# Patient Record
Sex: MALE | Race: WHITE | NOT HISPANIC OR LATINO | Employment: OTHER | URBAN - NONMETROPOLITAN AREA
[De-identification: names, ages, dates, MRNs, and addresses within clinical notes are randomized per-mention and may not be internally consistent; named-entity substitution may affect disease eponyms.]

---

## 2023-01-01 ENCOUNTER — DOCUMENTATION (OUTPATIENT)
Dept: PRIMARY CARE | Facility: CLINIC | Age: 33
End: 2023-01-01
Payer: COMMERCIAL

## 2023-01-01 ENCOUNTER — PATIENT OUTREACH (OUTPATIENT)
Dept: CARE COORDINATION | Facility: CLINIC | Age: 33
End: 2023-01-01
Payer: COMMERCIAL

## 2023-01-01 ENCOUNTER — APPOINTMENT (OUTPATIENT)
Dept: CARDIOLOGY | Facility: HOSPITAL | Age: 33
End: 2023-01-01
Payer: COMMERCIAL

## 2023-01-01 ENCOUNTER — HOME INFUSION (OUTPATIENT)
Dept: INFUSION THERAPY | Age: 33
End: 2023-01-01
Payer: COMMERCIAL

## 2023-01-01 ENCOUNTER — HOSPITAL ENCOUNTER (OUTPATIENT)
Dept: CARDIOLOGY | Facility: HOSPITAL | Age: 33
Discharge: HOME | End: 2023-10-10
Payer: COMMERCIAL

## 2023-01-01 ENCOUNTER — APPOINTMENT (OUTPATIENT)
Dept: RADIOLOGY | Facility: HOSPITAL | Age: 33
End: 2023-01-01
Payer: COMMERCIAL

## 2023-01-01 ENCOUNTER — PATIENT OUTREACH (OUTPATIENT)
Dept: PRIMARY CARE | Facility: CLINIC | Age: 33
End: 2023-01-01
Payer: COMMERCIAL

## 2023-01-01 ENCOUNTER — HOSPITAL ENCOUNTER (OUTPATIENT)
Dept: DATA CONVERSION | Facility: HOSPITAL | Age: 33
Discharge: LEFT BEFORE DISCHARGE | End: 2023-07-20
Attending: EMERGENCY MEDICINE

## 2023-01-01 ENCOUNTER — APPOINTMENT (OUTPATIENT)
Dept: VASCULAR MEDICINE | Facility: HOSPITAL | Age: 33
End: 2023-01-01
Payer: COMMERCIAL

## 2023-01-01 ENCOUNTER — DOCUMENTATION (OUTPATIENT)
Dept: OPERATING ROOM | Facility: HOSPITAL | Age: 33
End: 2023-01-01
Payer: COMMERCIAL

## 2023-01-01 ENCOUNTER — OFFICE VISIT (OUTPATIENT)
Dept: PRIMARY CARE | Facility: CLINIC | Age: 33
End: 2023-01-01
Payer: COMMERCIAL

## 2023-01-01 ENCOUNTER — HOSPITAL ENCOUNTER (INPATIENT)
Facility: HOSPITAL | Age: 33
LOS: 11 days | Discharge: HOME | End: 2023-10-21
Attending: STUDENT IN AN ORGANIZED HEALTH CARE EDUCATION/TRAINING PROGRAM | Admitting: STUDENT IN AN ORGANIZED HEALTH CARE EDUCATION/TRAINING PROGRAM
Payer: COMMERCIAL

## 2023-01-01 ENCOUNTER — EVALUATION (OUTPATIENT)
Dept: TRANSPLANT | Facility: HOSPITAL | Age: 33
End: 2023-01-01
Payer: COMMERCIAL

## 2023-01-01 ENCOUNTER — PHARMACY VISIT (OUTPATIENT)
Dept: PHARMACY | Facility: CLINIC | Age: 33
End: 2023-01-01
Payer: MEDICAID

## 2023-01-01 ENCOUNTER — APPOINTMENT (OUTPATIENT)
Dept: PRIMARY CARE | Facility: CLINIC | Age: 33
End: 2023-01-01
Payer: COMMERCIAL

## 2023-01-01 ENCOUNTER — APPOINTMENT (OUTPATIENT)
Dept: CARDIOLOGY | Facility: CLINIC | Age: 33
End: 2023-01-01
Payer: COMMERCIAL

## 2023-01-01 ENCOUNTER — HOSPITAL ENCOUNTER (EMERGENCY)
Facility: HOSPITAL | Age: 33
Discharge: STILL ADMITTED OR ADMINISTRATIVE | End: 2023-10-10
Payer: COMMERCIAL

## 2023-01-01 ENCOUNTER — OFFICE VISIT (OUTPATIENT)
Dept: CARDIOLOGY | Facility: CLINIC | Age: 33
End: 2023-01-01
Payer: COMMERCIAL

## 2023-01-01 ENCOUNTER — COMMITTEE REVIEW (OUTPATIENT)
Dept: TRANSPLANT | Facility: HOSPITAL | Age: 33
End: 2023-01-01

## 2023-01-01 ENCOUNTER — HOSPITAL ENCOUNTER (INPATIENT)
Dept: DATA CONVERSION | Facility: HOSPITAL | Age: 33
LOS: 3 days | Discharge: HOME | End: 2023-09-27
Attending: EMERGENCY MEDICINE | Admitting: INTERNAL MEDICINE
Payer: COMMERCIAL

## 2023-01-01 ENCOUNTER — HOME HEALTH ADMISSION (OUTPATIENT)
Dept: HOME HEALTH SERVICES | Facility: HOME HEALTH | Age: 33
End: 2023-01-01
Payer: COMMERCIAL

## 2023-01-01 ENCOUNTER — APPOINTMENT (OUTPATIENT)
Dept: RESPIRATORY THERAPY | Facility: HOSPITAL | Age: 33
End: 2023-01-01
Payer: COMMERCIAL

## 2023-01-01 VITALS
WEIGHT: 296.3 LBS | OXYGEN SATURATION: 94 % | DIASTOLIC BLOOD PRESSURE: 75 MMHG | RESPIRATION RATE: 18 BRPM | BODY MASS INDEX: 44.91 KG/M2 | HEIGHT: 68 IN | SYSTOLIC BLOOD PRESSURE: 114 MMHG | TEMPERATURE: 97 F | HEART RATE: 64 BPM

## 2023-01-01 VITALS
WEIGHT: 303 LBS | OXYGEN SATURATION: 96 % | HEART RATE: 120 BPM | DIASTOLIC BLOOD PRESSURE: 89 MMHG | SYSTOLIC BLOOD PRESSURE: 129 MMHG | BODY MASS INDEX: 46.08 KG/M2

## 2023-01-01 VITALS
HEART RATE: 57 BPM | DIASTOLIC BLOOD PRESSURE: 76 MMHG | BODY MASS INDEX: 46.53 KG/M2 | WEIGHT: 307 LBS | SYSTOLIC BLOOD PRESSURE: 130 MMHG | TEMPERATURE: 97.8 F | HEIGHT: 68 IN

## 2023-01-01 DIAGNOSIS — F19.10 OTHER PSYCHOACTIVE SUBSTANCE ABUSE, UNCOMPLICATED (MULTI): ICD-10-CM

## 2023-01-01 DIAGNOSIS — R07.9 CHEST PAIN, UNSPECIFIED: Primary | ICD-10-CM

## 2023-01-01 DIAGNOSIS — R09.89 OTHER SPECIFIED SYMPTOMS AND SIGNS INVOLVING THE CIRCULATORY AND RESPIRATORY SYSTEMS: ICD-10-CM

## 2023-01-01 DIAGNOSIS — I50.20 HEART FAILURE WITH REDUCED EJECTION FRACTION (MULTI): ICD-10-CM

## 2023-01-01 DIAGNOSIS — G89.29 OTHER CHRONIC PAIN: Primary | ICD-10-CM

## 2023-01-01 DIAGNOSIS — R06.02 SHORTNESS OF BREATH: ICD-10-CM

## 2023-01-01 DIAGNOSIS — Z79.899 MEDICATION MANAGEMENT: ICD-10-CM

## 2023-01-01 DIAGNOSIS — I50.9 ACUTE DECOMPENSATED HEART FAILURE (MULTI): Primary | ICD-10-CM

## 2023-01-01 DIAGNOSIS — Z20.822 CONTACT WITH AND (SUSPECTED) EXPOSURE TO COVID-19: ICD-10-CM

## 2023-01-01 DIAGNOSIS — I73.9 PERIPHERAL VASCULAR DISEASE, UNSPECIFIED (CMS-HCC): ICD-10-CM

## 2023-01-01 DIAGNOSIS — G25.81 RESTLESS LEGS SYNDROME: ICD-10-CM

## 2023-01-01 DIAGNOSIS — I50.22 CHRONIC SYSTOLIC HEART FAILURE (MULTI): Primary | ICD-10-CM

## 2023-01-01 DIAGNOSIS — I50.9 ACUTE DECOMPENSATED HEART FAILURE (MULTI): ICD-10-CM

## 2023-01-01 DIAGNOSIS — G47.33 OBSTRUCTIVE SLEEP APNEA SYNDROME: Chronic | ICD-10-CM

## 2023-01-01 DIAGNOSIS — R07.9 CHEST PAIN, UNSPECIFIED: ICD-10-CM

## 2023-01-01 LAB
1OH-MIDAZOLAM SERPL-MCNC: <20 NG/ML
1OH-MIDAZOLAM UR CFM-MCNC: <25 NG/ML
6MAM SERPL-MCNC: <2 NG/ML
6MAM UR CFM-MCNC: <25 NG/ML
7AMINOCLONAZEPAM SERPL-MCNC: <5 NG/ML
7AMINOCLONAZEPAM UR CFM-MCNC: <25 NG/ML
A-OH ALPRAZ SERPL-MCNC: <5 NG/ML
A-OH ALPRAZ UR CFM-MCNC: 102 NG/ML
ABO GROUP (TYPE) IN BLOOD: NORMAL
ACTIVATED PARTIAL THROMBOPLASTIN TIME IN PPP BY COAGULATION ASSAY: 28 SEC (ref 27–38)
ALANINE AMINOTRANSFERASE (SGPT) (U/L) IN SER/PLAS: 86 U/L (ref 10–52)
ALANINE AMINOTRANSFERASE (SGPT) (U/L) IN SER/PLAS: 87 U/L (ref 10–52)
ALBUMIN (G/DL) IN SER/PLAS: 3.6 G/DL (ref 3.4–5)
ALBUMIN (G/DL) IN SER/PLAS: 3.8 G/DL (ref 3.4–5)
ALBUMIN (G/DL) IN SER/PLAS: 4 G/DL (ref 3.4–5)
ALBUMIN (G/DL) IN SER/PLAS: 4.1 G/DL (ref 3.4–5)
ALBUMIN (G/DL) IN SER/PLAS: 4.3 G/DL (ref 3.4–5)
ALBUMIN (G/DL) IN SER/PLAS: 4.3 G/DL (ref 3.4–5)
ALBUMIN (G/DL) IN SER/PLAS: 4.4 G/DL (ref 3.4–5)
ALBUMIN SERPL BCP-MCNC: 3.9 G/DL (ref 3.4–5)
ALBUMIN SERPL BCP-MCNC: 4.2 G/DL (ref 3.4–5)
ALBUMIN SERPL BCP-MCNC: 4.2 G/DL (ref 3.4–5)
ALBUMIN SERPL BCP-MCNC: 4.3 G/DL (ref 3.4–5)
ALBUMIN SERPL BCP-MCNC: 4.3 G/DL (ref 3.4–5)
ALBUMIN SERPL BCP-MCNC: 4.4 G/DL (ref 3.4–5)
ALBUMIN SERPL BCP-MCNC: 4.4 G/DL (ref 3.4–5)
ALBUMIN SERPL BCP-MCNC: 4.5 G/DL (ref 3.4–5)
ALBUMIN SERPL BCP-MCNC: 4.6 G/DL (ref 3.4–5)
ALBUMIN SERPL BCP-MCNC: 4.7 G/DL (ref 3.4–5)
ALBUMIN SERPL BCP-MCNC: 4.8 G/DL (ref 3.4–5)
ALBUMIN SERPL BCP-MCNC: 4.9 G/DL (ref 3.4–5)
ALKALINE PHOSPHATASE (U/L) IN SER/PLAS: 80 U/L (ref 33–120)
ALKALINE PHOSPHATASE (U/L) IN SER/PLAS: 88 U/L (ref 33–120)
ALP SERPL-CCNC: 85 U/L (ref 33–120)
ALPRAZ SERPL-MCNC: 79 NG/ML
ALPRAZ UR CFM-MCNC: 183 NG/ML
ALT SERPL W P-5'-P-CCNC: 50 U/L (ref 10–52)
AMPHETAMINES SERPL QL SCN: NEGATIVE NG/ML
AMPHETAMINES UR QL SCN: ABNORMAL
ANABASINE UR-MCNC: <5 NG/ML
ANION GAP BLDMV CALCULATED.4IONS-SCNC: 7 MMO/L (ref 10–25)
ANION GAP BLDMV CALCULATED.4IONS-SCNC: 8 MMO/L (ref 10–25)
ANION GAP BLDV CALCULATED.4IONS-SCNC: 7 MMOL/L (ref 10–25)
ANION GAP IN SER/PLAS: 11 MMOL/L (ref 10–20)
ANION GAP IN SER/PLAS: 11 MMOL/L (ref 10–20)
ANION GAP IN SER/PLAS: 13 MMOL/L (ref 10–20)
ANION GAP IN SER/PLAS: 14 MMOL/L (ref 10–20)
ANION GAP IN SER/PLAS: 17 MMOL/L (ref 10–20)
ANION GAP SERPL CALC-SCNC: 12 MMOL/L (ref 10–20)
ANION GAP SERPL CALC-SCNC: 13 MMOL/L (ref 10–20)
ANION GAP SERPL CALC-SCNC: 13 MMOL/L (ref 10–20)
ANION GAP SERPL CALC-SCNC: 14 MMOL/L (ref 10–20)
ANION GAP SERPL CALC-SCNC: 14 MMOL/L (ref 10–20)
ANION GAP SERPL CALC-SCNC: 15 MMOL/L (ref 10–20)
ANION GAP SERPL CALC-SCNC: 16 MMOL/L (ref 10–20)
ANION GAP SERPL CALC-SCNC: 17 MMOL/L (ref 10–20)
ANION GAP SERPL CALC-SCNC: 17 MMOL/L (ref 10–20)
ANION GAP SERPL CALC-SCNC: 18 MMOL/L (ref 10–20)
ANNOTATION COMMENT IMP: NORMAL
APPEARANCE UR: CLEAR
APTT PPP: 31 SECONDS (ref 27–38)
APTT PPP: 60 SECONDS (ref 27–38)
ASPARTATE AMINOTRANSFERASE (SGOT) (U/L) IN SER/PLAS: 34 U/L (ref 9–39)
ASPARTATE AMINOTRANSFERASE (SGOT) (U/L) IN SER/PLAS: 49 U/L (ref 9–39)
AST SERPL W P-5'-P-CCNC: 28 U/L (ref 9–39)
ATRIAL RATE: 111 BPM
ATRIAL RATE: 112 BPM
ATRIAL RATE: 115 BPM
B-HCG SERPL-ACNC: <3 MIU/ML
BACTERIA BLD CULT: NORMAL
BARBITURATES SERPL QL SCN: NEGATIVE NG/ML
BARBITURATES UR QL SCN: ABNORMAL
BASE EXCESS BLDA CALC-SCNC: 5.8 MMOL/L (ref -2–3)
BASE EXCESS BLDMV CALC-SCNC: 0.7 MMOL/L (ref -2–3)
BASE EXCESS BLDMV CALC-SCNC: 1.2 MMOL/L (ref -2–3)
BASE EXCESS BLDMV CALC-SCNC: 2 MMOL/L (ref -2–3)
BASE EXCESS BLDMV CALC-SCNC: 2.2 MMOL/L (ref -2–3)
BASE EXCESS BLDMV CALC-SCNC: 2.4 MMOL/L (ref -2–3)
BASE EXCESS BLDMV CALC-SCNC: 2.5 MMOL/L (ref -2–3)
BASE EXCESS BLDMV CALC-SCNC: 3.2 MMOL/L (ref -2–3)
BASE EXCESS BLDMV CALC-SCNC: 4.5 MMOL/L (ref -2–3)
BASE EXCESS BLDMV CALC-SCNC: 4.9 MMOL/L (ref -2–3)
BASE EXCESS BLDMV CALC-SCNC: 4.9 MMOL/L (ref -2–3)
BASE EXCESS BLDMV CALC-SCNC: 5 MMOL/L (ref -2–3)
BASE EXCESS BLDMV CALC-SCNC: 5 MMOL/L (ref -2–3)
BASE EXCESS BLDMV CALC-SCNC: 5.3 MMOL/L (ref -2–3)
BASE EXCESS BLDMV CALC-SCNC: 5.6 MMOL/L (ref -2–3)
BASE EXCESS BLDV CALC-SCNC: 1.4 MMOL/L (ref -2–3)
BASOPHILS (10*3/UL) IN BLOOD BY AUTOMATED COUNT: 0.04 X10E9/L (ref 0–0.1)
BASOPHILS (10*3/UL) IN BLOOD BY AUTOMATED COUNT: 0.05 X10E9/L (ref 0–0.1)
BASOPHILS (10*3/UL) IN BLOOD BY AUTOMATED COUNT: 0.06 X10E9/L (ref 0–0.1)
BASOPHILS/100 LEUKOCYTES IN BLOOD BY AUTOMATED COUNT: 0.8 % (ref 0–2)
BASOPHILS/100 LEUKOCYTES IN BLOOD BY AUTOMATED COUNT: 0.9 % (ref 0–2)
BASOPHILS/100 LEUKOCYTES IN BLOOD BY AUTOMATED COUNT: 1 % (ref 0–2)
BENZODIAZ SERPL QL SCN: POSITIVE NG/ML
BENZODIAZ UR QL SCN: ABNORMAL
BILIRUB SERPL-MCNC: 0.5 MG/DL (ref 0–1.2)
BILIRUB UR STRIP.AUTO-MCNC: NEGATIVE MG/DL
BILIRUBIN TOTAL (MG/DL) IN SER/PLAS: 0.3 MG/DL (ref 0–1.2)
BILIRUBIN TOTAL (MG/DL) IN SER/PLAS: 0.4 MG/DL (ref 0–1.2)
BNP SERPL-MCNC: 176 PG/ML (ref 0–99)
BODY TEMPERATURE: 37 DEGREES CELSIUS
BUN SERPL-MCNC: 16 MG/DL (ref 6–23)
BUN SERPL-MCNC: 17 MG/DL (ref 6–23)
BUN SERPL-MCNC: 18 MG/DL (ref 6–23)
BUN SERPL-MCNC: 18 MG/DL (ref 6–23)
BUN SERPL-MCNC: 19 MG/DL (ref 6–23)
BUN SERPL-MCNC: 19 MG/DL (ref 6–23)
BUN SERPL-MCNC: 20 MG/DL (ref 6–23)
BUN SERPL-MCNC: 21 MG/DL (ref 6–23)
BUN SERPL-MCNC: 22 MG/DL (ref 6–23)
BUN SERPL-MCNC: 23 MG/DL (ref 6–23)
BUN SERPL-MCNC: 25 MG/DL (ref 6–23)
BUN SERPL-MCNC: 28 MG/DL (ref 6–23)
BUPRENORPHINE SERPL-MCNC: NEGATIVE NG/ML
BZE UR QL SCN: ABNORMAL
CA-I BLDMV-SCNC: 1.21 MMOL/L (ref 1.1–1.33)
CA-I BLDMV-SCNC: 1.24 MMOL/L (ref 1.1–1.33)
CA-I BLDV-SCNC: 1.17 MMOL/L (ref 1.1–1.33)
CALCIUM (MG/DL) IN SER/PLAS: 9.2 MG/DL (ref 8.6–10.6)
CALCIUM (MG/DL) IN SER/PLAS: 9.3 MG/DL (ref 8.6–10.6)
CALCIUM (MG/DL) IN SER/PLAS: 9.3 MG/DL (ref 8.6–10.6)
CALCIUM (MG/DL) IN SER/PLAS: 9.6 MG/DL (ref 8.6–10.6)
CALCIUM (MG/DL) IN SER/PLAS: 9.6 MG/DL (ref 8.6–10.6)
CALCIUM (MG/DL) IN SER/PLAS: 9.7 MG/DL (ref 8.6–10.6)
CALCIUM (MG/DL) IN SER/PLAS: 9.7 MG/DL (ref 8.6–10.6)
CALCIUM SERPL-MCNC: 10 MG/DL (ref 8.6–10.6)
CALCIUM SERPL-MCNC: 10 MG/DL (ref 8.6–10.6)
CALCIUM SERPL-MCNC: 10.5 MG/DL (ref 8.6–10.6)
CALCIUM SERPL-MCNC: 10.8 MG/DL (ref 8.6–10.6)
CALCIUM SERPL-MCNC: 9.3 MG/DL (ref 8.6–10.6)
CALCIUM SERPL-MCNC: 9.4 MG/DL (ref 8.6–10.6)
CALCIUM SERPL-MCNC: 9.5 MG/DL (ref 8.6–10.6)
CALCIUM SERPL-MCNC: 9.6 MG/DL (ref 8.6–10.6)
CALCIUM SERPL-MCNC: 9.8 MG/DL (ref 8.6–10.6)
CALCIUM SERPL-MCNC: 9.9 MG/DL (ref 8.6–10.6)
CANNABINOIDS SERPL QL SCN: NEGATIVE NG/ML
CANNABINOIDS UR QL SCN: ABNORMAL
CARBON DIOXIDE, TOTAL (MMOL/L) IN SER/PLAS: 28 MMOL/L (ref 21–32)
CARBON DIOXIDE, TOTAL (MMOL/L) IN SER/PLAS: 29 MMOL/L (ref 21–32)
CARBON DIOXIDE, TOTAL (MMOL/L) IN SER/PLAS: 30 MMOL/L (ref 21–32)
CARDIAC TROPONIN I PNL SERPL HS: 18 NG/L (ref 0–53)
CARDIAC TROPONIN I PNL SERPL HS: 25 NG/L (ref 0–53)
CARDIAC TROPONIN I PNL SERPL HS: 30 NG/L (ref 0–53)
CARDIOLIPIN IGA SERPL-ACNC: <0.5 APL U/ML
CARDIOLIPIN IGG SER IA-ACNC: <1.6 GPL U/ML
CARDIOLIPIN IGM SER IA-ACNC: <0.2 MPL U/ML
CHLORDIAZEP SERPL-MCNC: <20 NG/ML
CHLORDIAZEP UR CFM-MCNC: <25 NG/ML
CHLORIDE (MMOL/L) IN SER/PLAS: 100 MMOL/L (ref 98–107)
CHLORIDE (MMOL/L) IN SER/PLAS: 100 MMOL/L (ref 98–107)
CHLORIDE (MMOL/L) IN SER/PLAS: 101 MMOL/L (ref 98–107)
CHLORIDE (MMOL/L) IN SER/PLAS: 102 MMOL/L (ref 98–107)
CHLORIDE (MMOL/L) IN SER/PLAS: 105 MMOL/L (ref 98–107)
CHLORIDE (MMOL/L) IN SER/PLAS: 99 MMOL/L (ref 98–107)
CHLORIDE (MMOL/L) IN SER/PLAS: 99 MMOL/L (ref 98–107)
CHLORIDE BLD-SCNC: 102 MMOL/L (ref 98–107)
CHLORIDE BLD-SCNC: 97 MMOL/L (ref 98–107)
CHLORIDE BLDV-SCNC: 97 MMOL/L (ref 98–107)
CHLORIDE SERPL-SCNC: 100 MMOL/L (ref 98–107)
CHLORIDE SERPL-SCNC: 94 MMOL/L (ref 98–107)
CHLORIDE SERPL-SCNC: 94 MMOL/L (ref 98–107)
CHLORIDE SERPL-SCNC: 95 MMOL/L (ref 98–107)
CHLORIDE SERPL-SCNC: 96 MMOL/L (ref 98–107)
CHLORIDE SERPL-SCNC: 96 MMOL/L (ref 98–107)
CHLORIDE SERPL-SCNC: 97 MMOL/L (ref 98–107)
CHLORIDE SERPL-SCNC: 98 MMOL/L (ref 98–107)
CHLORIDE SERPL-SCNC: 99 MMOL/L (ref 98–107)
CHLORIDE SERPL-SCNC: 99 MMOL/L (ref 98–107)
CLONAZEPAM SERPL-MCNC: <5 NG/ML
CLONAZEPAM UR CFM-MCNC: <25 NG/ML
CO2 SERPL-SCNC: 27 MMOL/L (ref 21–32)
CO2 SERPL-SCNC: 28 MMOL/L (ref 21–32)
CO2 SERPL-SCNC: 29 MMOL/L (ref 21–32)
CO2 SERPL-SCNC: 30 MMOL/L (ref 21–32)
CO2 SERPL-SCNC: 31 MMOL/L (ref 21–32)
COCAINE SERPL QL SCN: NEGATIVE NG/ML
CODEINE SERPL-MCNC: <2 NG/ML
CODEINE UR CFM-MCNC: <50 NG/ML
COHGB MFR BLDA: 2.5 %
COLOR UR: ABNORMAL
COTININE UR-MCNC: <15 NG/ML
CREAT SERPL-MCNC: 0.91 MG/DL (ref 0.5–1.3)
CREAT SERPL-MCNC: 0.97 MG/DL (ref 0.5–1.3)
CREAT SERPL-MCNC: 1.01 MG/DL (ref 0.5–1.3)
CREAT SERPL-MCNC: 1.01 MG/DL (ref 0.5–1.3)
CREAT SERPL-MCNC: 1.03 MG/DL (ref 0.5–1.3)
CREAT SERPL-MCNC: 1.04 MG/DL (ref 0.5–1.3)
CREAT SERPL-MCNC: 1.13 MG/DL (ref 0.5–1.3)
CREAT SERPL-MCNC: 1.14 MG/DL (ref 0.5–1.3)
CREAT SERPL-MCNC: 1.15 MG/DL (ref 0.5–1.3)
CREAT SERPL-MCNC: 1.22 MG/DL (ref 0.5–1.3)
CREAT SERPL-MCNC: 1.22 MG/DL (ref 0.5–1.3)
CREAT SERPL-MCNC: 1.3 MG/DL (ref 0.5–1.3)
CREAT SERPL-MCNC: 1.46 MG/DL (ref 0.5–1.3)
CREAT SERPL-MCNC: 1.47 MG/DL (ref 0.5–1.3)
CREATINE KINASE (U/L) IN SER/PLAS: NORMAL
CREATININE (MG/DL) IN SER/PLAS: 0.83 MG/DL (ref 0.5–1.3)
CREATININE (MG/DL) IN SER/PLAS: 0.95 MG/DL (ref 0.5–1.3)
CREATININE (MG/DL) IN SER/PLAS: 0.96 MG/DL (ref 0.5–1.3)
CREATININE (MG/DL) IN SER/PLAS: 0.99 MG/DL (ref 0.5–1.3)
CREATININE (MG/DL) IN SER/PLAS: 1.06 MG/DL (ref 0.5–1.3)
CREATININE (MG/DL) IN SER/PLAS: 1.11 MG/DL (ref 0.5–1.3)
CREATININE (MG/DL) IN SER/PLAS: 1.26 MG/DL (ref 0.5–1.3)
D-DIMER, QUANTITATIVE VTE EXCLUSION: NORMAL
DIAZEPAM SERPL-MCNC: <5 NG/ML
DIAZEPAM UR CFM-MCNC: <25 NG/ML
DIGOXIN (NG/ML) IN SER/PLAS: <0.3 NG/ML (ref 0.8–2)
DO-HGB MFR BLDA: 4.4 % (ref 0–5)
EDDP UR CFM-MCNC: <25 NG/ML
EOSINOPHILS (10*3/UL) IN BLOOD BY AUTOMATED COUNT: 0.23 X10E9/L (ref 0–0.7)
EOSINOPHILS (10*3/UL) IN BLOOD BY AUTOMATED COUNT: 0.24 X10E9/L (ref 0–0.7)
EOSINOPHILS (10*3/UL) IN BLOOD BY AUTOMATED COUNT: 0.32 X10E9/L (ref 0–0.7)
EOSINOPHILS/100 LEUKOCYTES IN BLOOD BY AUTOMATED COUNT: 4.6 % (ref 0–6)
EOSINOPHILS/100 LEUKOCYTES IN BLOOD BY AUTOMATED COUNT: 4.6 % (ref 0–6)
EOSINOPHILS/100 LEUKOCYTES IN BLOOD BY AUTOMATED COUNT: 4.7 % (ref 0–6)
ERYTHROCYTE DISTRIBUTION WIDTH (RATIO) BY AUTOMATED COUNT: 14.2 % (ref 11.5–14.5)
ERYTHROCYTE DISTRIBUTION WIDTH (RATIO) BY AUTOMATED COUNT: 14.3 % (ref 11.5–14.5)
ERYTHROCYTE DISTRIBUTION WIDTH (RATIO) BY AUTOMATED COUNT: 14.4 % (ref 11.5–14.5)
ERYTHROCYTE MEAN CORPUSCULAR HEMOGLOBIN CONCENTRATION (G/DL) BY AUTOMATED: 32 G/DL (ref 32–36)
ERYTHROCYTE MEAN CORPUSCULAR HEMOGLOBIN CONCENTRATION (G/DL) BY AUTOMATED: 32 G/DL (ref 32–36)
ERYTHROCYTE MEAN CORPUSCULAR HEMOGLOBIN CONCENTRATION (G/DL) BY AUTOMATED: 32.9 G/DL (ref 32–36)
ERYTHROCYTE MEAN CORPUSCULAR HEMOGLOBIN CONCENTRATION (G/DL) BY AUTOMATED: 33.9 G/DL (ref 32–36)
ERYTHROCYTE MEAN CORPUSCULAR HEMOGLOBIN CONCENTRATION (G/DL) BY AUTOMATED: 34.2 G/DL (ref 32–36)
ERYTHROCYTE MEAN CORPUSCULAR VOLUME (FL) BY AUTOMATED COUNT: 77 FL (ref 80–100)
ERYTHROCYTE MEAN CORPUSCULAR VOLUME (FL) BY AUTOMATED COUNT: 78 FL (ref 80–100)
ERYTHROCYTE MEAN CORPUSCULAR VOLUME (FL) BY AUTOMATED COUNT: 79 FL (ref 80–100)
ERYTHROCYTE [DISTWIDTH] IN BLOOD BY AUTOMATED COUNT: 14.6 % (ref 11.5–14.5)
ERYTHROCYTE [DISTWIDTH] IN BLOOD BY AUTOMATED COUNT: 15 % (ref 11.5–14.5)
ERYTHROCYTE [DISTWIDTH] IN BLOOD BY AUTOMATED COUNT: 15.1 % (ref 11.5–14.5)
ERYTHROCYTE [DISTWIDTH] IN BLOOD BY AUTOMATED COUNT: 15.2 % (ref 11.5–14.5)
ERYTHROCYTE [DISTWIDTH] IN BLOOD BY AUTOMATED COUNT: 15.2 % (ref 11.5–14.5)
ERYTHROCYTE [DISTWIDTH] IN BLOOD BY AUTOMATED COUNT: 15.3 % (ref 11.5–14.5)
ERYTHROCYTE [DISTWIDTH] IN BLOOD BY AUTOMATED COUNT: 15.3 % (ref 11.5–14.5)
ERYTHROCYTE [DISTWIDTH] IN BLOOD BY AUTOMATED COUNT: 15.4 % (ref 11.5–14.5)
ERYTHROCYTES (10*6/UL) IN BLOOD BY AUTOMATED COUNT: 4.24 X10E12/L (ref 4.5–5.9)
ERYTHROCYTES (10*6/UL) IN BLOOD BY AUTOMATED COUNT: 4.4 X10E12/L (ref 4.5–5.9)
ERYTHROCYTES (10*6/UL) IN BLOOD BY AUTOMATED COUNT: 4.59 X10E12/L (ref 4.5–5.9)
ERYTHROCYTES (10*6/UL) IN BLOOD BY AUTOMATED COUNT: 4.87 X10E12/L (ref 4.5–5.9)
ERYTHROCYTES (10*6/UL) IN BLOOD BY AUTOMATED COUNT: 4.93 X10E12/L (ref 4.5–5.9)
ETHANOL SERPL-MCNC: <10 MG/DL
EUA DISCLAIMER: NORMAL
FENTANYL UR CFM-MCNC: <2.5 NG/ML
FENTANYL+NORFENTANYL UR QL SCN: ABNORMAL
FERRITIN SERPL-MCNC: 189 NG/ML (ref 20–300)
FLUAV RNA NPH QL NAA+PROBE: NEGATIVE
FLUBV RNA NPH QL NAA+PROBE: NEGATIVE
FOLATE SERPL-MCNC: 11.8 NG/ML
GFR MALE: 76 ML/MIN/1.73M2
GFR MALE: 89 ML/MIN/1.73M2
GFR MALE: >90 ML/MIN/1.73M2
GFR SERPL CREATININE-BSD FRML MDRD: 64 ML/MIN/1.73M*2
GFR SERPL CREATININE-BSD FRML MDRD: 64 ML/MIN/1.73M*2
GFR SERPL CREATININE-BSD FRML MDRD: 74 ML/MIN/1.73M*2
GFR SERPL CREATININE-BSD FRML MDRD: 80 ML/MIN/1.73M*2
GFR SERPL CREATININE-BSD FRML MDRD: 80 ML/MIN/1.73M*2
GFR SERPL CREATININE-BSD FRML MDRD: 86 ML/MIN/1.73M*2
GFR SERPL CREATININE-BSD FRML MDRD: 87 ML/MIN/1.73M*2
GFR SERPL CREATININE-BSD FRML MDRD: 87 ML/MIN/1.73M*2
GFR SERPL CREATININE-BSD FRML MDRD: >90 ML/MIN/1.73M*2
GLUCOSE (MG/DL) IN SER/PLAS: 131 MG/DL (ref 74–99)
GLUCOSE (MG/DL) IN SER/PLAS: 139 MG/DL (ref 74–99)
GLUCOSE (MG/DL) IN SER/PLAS: 151 MG/DL (ref 74–99)
GLUCOSE (MG/DL) IN SER/PLAS: 152 MG/DL (ref 74–99)
GLUCOSE (MG/DL) IN SER/PLAS: 154 MG/DL (ref 74–99)
GLUCOSE (MG/DL) IN SER/PLAS: 158 MG/DL (ref 74–99)
GLUCOSE (MG/DL) IN SER/PLAS: 169 MG/DL (ref 74–99)
GLUCOSE BLD MANUAL STRIP-MCNC: 141 MG/DL (ref 74–99)
GLUCOSE BLD MANUAL STRIP-MCNC: 143 MG/DL (ref 74–99)
GLUCOSE BLD MANUAL STRIP-MCNC: 145 MG/DL (ref 74–99)
GLUCOSE BLD MANUAL STRIP-MCNC: 151 MG/DL (ref 74–99)
GLUCOSE BLD MANUAL STRIP-MCNC: 155 MG/DL (ref 74–99)
GLUCOSE BLD MANUAL STRIP-MCNC: 156 MG/DL (ref 74–99)
GLUCOSE BLD MANUAL STRIP-MCNC: 157 MG/DL (ref 74–99)
GLUCOSE BLD MANUAL STRIP-MCNC: 157 MG/DL (ref 74–99)
GLUCOSE BLD MANUAL STRIP-MCNC: 163 MG/DL (ref 74–99)
GLUCOSE BLD MANUAL STRIP-MCNC: 165 MG/DL (ref 74–99)
GLUCOSE BLD MANUAL STRIP-MCNC: 167 MG/DL (ref 74–99)
GLUCOSE BLD MANUAL STRIP-MCNC: 168 MG/DL (ref 74–99)
GLUCOSE BLD MANUAL STRIP-MCNC: 169 MG/DL (ref 74–99)
GLUCOSE BLD MANUAL STRIP-MCNC: 172 MG/DL (ref 74–99)
GLUCOSE BLD MANUAL STRIP-MCNC: 174 MG/DL (ref 74–99)
GLUCOSE BLD MANUAL STRIP-MCNC: 175 MG/DL (ref 74–99)
GLUCOSE BLD MANUAL STRIP-MCNC: 193 MG/DL (ref 74–99)
GLUCOSE BLD MANUAL STRIP-MCNC: 195 MG/DL (ref 74–99)
GLUCOSE BLD MANUAL STRIP-MCNC: 197 MG/DL (ref 74–99)
GLUCOSE BLD MANUAL STRIP-MCNC: 198 MG/DL (ref 74–99)
GLUCOSE BLD MANUAL STRIP-MCNC: 201 MG/DL (ref 74–99)
GLUCOSE BLD MANUAL STRIP-MCNC: 202 MG/DL (ref 74–99)
GLUCOSE BLD MANUAL STRIP-MCNC: 204 MG/DL (ref 74–99)
GLUCOSE BLD MANUAL STRIP-MCNC: 205 MG/DL (ref 74–99)
GLUCOSE BLD MANUAL STRIP-MCNC: 207 MG/DL (ref 74–99)
GLUCOSE BLD MANUAL STRIP-MCNC: 208 MG/DL (ref 74–99)
GLUCOSE BLD MANUAL STRIP-MCNC: 209 MG/DL (ref 74–99)
GLUCOSE BLD MANUAL STRIP-MCNC: 213 MG/DL (ref 74–99)
GLUCOSE BLD MANUAL STRIP-MCNC: 213 MG/DL (ref 74–99)
GLUCOSE BLD MANUAL STRIP-MCNC: 219 MG/DL (ref 74–99)
GLUCOSE BLD MANUAL STRIP-MCNC: 222 MG/DL (ref 74–99)
GLUCOSE BLD MANUAL STRIP-MCNC: 223 MG/DL (ref 74–99)
GLUCOSE BLD MANUAL STRIP-MCNC: 235 MG/DL (ref 74–99)
GLUCOSE BLD MANUAL STRIP-MCNC: 237 MG/DL (ref 74–99)
GLUCOSE BLD MANUAL STRIP-MCNC: 246 MG/DL (ref 74–99)
GLUCOSE BLD MANUAL STRIP-MCNC: 259 MG/DL (ref 74–99)
GLUCOSE BLD MANUAL STRIP-MCNC: 264 MG/DL (ref 74–99)
GLUCOSE BLD-MCNC: 162 MG/DL (ref 74–99)
GLUCOSE BLD-MCNC: 268 MG/DL (ref 74–99)
GLUCOSE BLDV-MCNC: 251 MG/DL (ref 74–99)
GLUCOSE SERPL-MCNC: 137 MG/DL (ref 74–99)
GLUCOSE SERPL-MCNC: 154 MG/DL (ref 74–99)
GLUCOSE SERPL-MCNC: 159 MG/DL (ref 74–99)
GLUCOSE SERPL-MCNC: 164 MG/DL (ref 74–99)
GLUCOSE SERPL-MCNC: 174 MG/DL (ref 74–99)
GLUCOSE SERPL-MCNC: 176 MG/DL (ref 74–99)
GLUCOSE SERPL-MCNC: 179 MG/DL (ref 74–99)
GLUCOSE SERPL-MCNC: 196 MG/DL (ref 74–99)
GLUCOSE SERPL-MCNC: 204 MG/DL (ref 74–99)
GLUCOSE SERPL-MCNC: 212 MG/DL (ref 74–99)
GLUCOSE SERPL-MCNC: 214 MG/DL (ref 74–99)
GLUCOSE SERPL-MCNC: 221 MG/DL (ref 74–99)
GLUCOSE SERPL-MCNC: 225 MG/DL (ref 74–99)
GLUCOSE SERPL-MCNC: 261 MG/DL (ref 74–99)
GLUCOSE UR STRIP.AUTO-MCNC: ABNORMAL MG/DL
HAV AB SER QL IA: REACTIVE
HBV CORE AB SER QL: NONREACTIVE
HBV SURFACE AB SER-ACNC: 809.9 MIU/ML
HBV SURFACE AG SERPL QL IA: NONREACTIVE
HCO3 BLDA-SCNC: 30.6 MMOL/L (ref 22–26)
HCO3 BLDMV-SCNC: 25.4 MMOL/L (ref 22–26)
HCO3 BLDMV-SCNC: 28.7 MMOL/L (ref 22–26)
HCO3 BLDMV-SCNC: 29.1 MMOL/L (ref 22–26)
HCO3 BLDMV-SCNC: 29.3 MMOL/L (ref 22–26)
HCO3 BLDMV-SCNC: 29.4 MMOL/L (ref 22–26)
HCO3 BLDMV-SCNC: 29.9 MMOL/L (ref 22–26)
HCO3 BLDMV-SCNC: 30.6 MMOL/L (ref 22–26)
HCO3 BLDMV-SCNC: 31.2 MMOL/L (ref 22–26)
HCO3 BLDMV-SCNC: 31.4 MMOL/L (ref 22–26)
HCO3 BLDMV-SCNC: 31.8 MMOL/L (ref 22–26)
HCO3 BLDMV-SCNC: 31.8 MMOL/L (ref 22–26)
HCO3 BLDMV-SCNC: 31.9 MMOL/L (ref 22–26)
HCO3 BLDMV-SCNC: 32.7 MMOL/L (ref 22–26)
HCO3 BLDMV-SCNC: 33.1 MMOL/L (ref 22–26)
HCO3 BLDV-SCNC: 28.5 MMOL/L (ref 22–26)
HCT VFR BLD AUTO: 34.9 % (ref 41–52)
HCT VFR BLD AUTO: 37.3 % (ref 41–52)
HCT VFR BLD AUTO: 37.6 % (ref 41–52)
HCT VFR BLD AUTO: 39.8 % (ref 41–52)
HCT VFR BLD AUTO: 40.1 % (ref 41–52)
HCT VFR BLD AUTO: 40.2 % (ref 41–52)
HCT VFR BLD AUTO: 41 % (ref 41–52)
HCT VFR BLD AUTO: 41.1 % (ref 41–52)
HCT VFR BLD AUTO: 41.4 % (ref 41–52)
HCT VFR BLD AUTO: 41.9 % (ref 41–52)
HCT VFR BLD AUTO: 43.4 % (ref 41–52)
HCT VFR BLD AUTO: 45 % (ref 41–52)
HCT VFR BLD EST: 40 % (ref 41–52)
HCT VFR BLD EST: 40 % (ref 41–52)
HCT VFR BLD EST: 41 % (ref 41–52)
HCV AB SER QL: NONREACTIVE
HCV RNA SERPL NAA+PROBE-ACNC: NOT DETECTED K[IU]/ML
HCV RNA SERPL NAA+PROBE-LOG IU: NORMAL {LOG_IU}/ML
HEMATOCRIT (%) IN BLOOD BY AUTOMATED COUNT: 33.4 % (ref 41–52)
HEMATOCRIT (%) IN BLOOD BY AUTOMATED COUNT: 34.7 % (ref 41–52)
HEMATOCRIT (%) IN BLOOD BY AUTOMATED COUNT: 35.7 % (ref 41–52)
HEMATOCRIT (%) IN BLOOD BY AUTOMATED COUNT: 37.4 % (ref 41–52)
HEMATOCRIT (%) IN BLOOD BY AUTOMATED COUNT: 38.9 % (ref 41–52)
HEMOGLOBIN (G/DL) IN BLOOD: 10.7 G/DL (ref 13.5–17.5)
HEMOGLOBIN (G/DL) IN BLOOD: 11.1 G/DL (ref 13.5–17.5)
HEMOGLOBIN (G/DL) IN BLOOD: 12.1 G/DL (ref 13.5–17.5)
HEMOGLOBIN (G/DL) IN BLOOD: 12.8 G/DL (ref 13.5–17.5)
HEMOGLOBIN (G/DL) IN BLOOD: 12.8 G/DL (ref 13.5–17.5)
HGB BLD-MCNC: 11.3 G/DL (ref 13.5–17.5)
HGB BLD-MCNC: 11.8 G/DL (ref 13.5–17.5)
HGB BLD-MCNC: 12 G/DL (ref 13.5–17.5)
HGB BLD-MCNC: 12.8 G/DL (ref 13.5–17.5)
HGB BLD-MCNC: 12.9 G/DL (ref 13.5–17.5)
HGB BLD-MCNC: 13 G/DL (ref 13.5–17.5)
HGB BLD-MCNC: 13.1 G/DL (ref 13.5–17.5)
HGB BLD-MCNC: 13.4 G/DL (ref 13.5–17.5)
HGB BLD-MCNC: 13.5 G/DL (ref 13.5–17.5)
HGB BLD-MCNC: 13.7 G/DL (ref 13.5–17.5)
HGB BLDA-MCNC: 14 G/DL (ref 13.5–17.5)
HGB BLDMV-MCNC: 13.3 G/DL (ref 13.5–17.5)
HGB BLDMV-MCNC: 13.4 G/DL (ref 13.5–17.5)
HGB BLDV-MCNC: 13.8 G/DL (ref 13.5–17.5)
HIV 1+2 AB+HIV1 P24 AG SERPL QL IA: NONREACTIVE
HYDROCODONE CTO UR CFM-MCNC: <25 NG/ML
HYDROCODONE SERPL-MCNC: <2 NG/ML
HYDROMORPHONE SERPL-MCNC: <2 NG/ML
HYDROMORPHONE UR CFM-MCNC: <25 NG/ML
IMMATURE GRANULOCYTES/100 LEUKOCYTES IN BLOOD BY AUTOMATED COUNT: 0.4 % (ref 0–0.9)
IMMATURE GRANULOCYTES/100 LEUKOCYTES IN BLOOD BY AUTOMATED COUNT: 0.6 % (ref 0–0.9)
IMMATURE GRANULOCYTES/100 LEUKOCYTES IN BLOOD BY AUTOMATED COUNT: 0.6 % (ref 0–0.9)
INHALED O2 CONCENTRATION: 0 %
INHALED O2 CONCENTRATION: 21 %
INHALED O2 CONCENTRATION: 21 %
INHALED O2 CONCENTRATION: 30 %
INHALED O2 CONCENTRATION: 40 %
INHALED O2 CONCENTRATION: 93 %
INHALED O2 CONCENTRATION: 93 %
INR IN PPP BY COAGULATION ASSAY: 1 (ref 0.9–1.1)
INR IN PPP BY COAGULATION ASSAY: 1 (ref 0.9–1.1)
INR PPP: 1 (ref 0.9–1.1)
INR PPP: 1.1 (ref 0.9–1.1)
IRON SATN MFR SERPL: 23 % (ref 25–45)
IRON SERPL-MCNC: 93 UG/DL (ref 35–150)
KETONES UR STRIP.AUTO-MCNC: NEGATIVE MG/DL
LACTATE (MMOL/L) IN SER/PLAS: 1.3 MMOL/L (ref 0.4–2)
LACTATE (MMOL/L) IN SER/PLAS: 2.1 MMOL/L (ref 0.4–2)
LACTATE BLDMV-SCNC: 1.2 MMOL/L (ref 0.4–2)
LACTATE BLDMV-SCNC: 2 MMOL/L (ref 0.4–2)
LACTATE BLDV-SCNC: 0.8 MMOL/L (ref 0.4–2)
LDH SERPL L TO P-CCNC: 153 U/L (ref 84–246)
LEUKOCYTE ESTERASE UR QL STRIP.AUTO: NEGATIVE
LEUKOCYTES (10*3/UL) IN BLOOD BY AUTOMATED COUNT: 4.6 X10E9/L (ref 4.4–11.3)
LEUKOCYTES (10*3/UL) IN BLOOD BY AUTOMATED COUNT: 4.9 X10E9/L (ref 4.4–11.3)
LEUKOCYTES (10*3/UL) IN BLOOD BY AUTOMATED COUNT: 5.3 X10E9/L (ref 4.4–11.3)
LEUKOCYTES (10*3/UL) IN BLOOD BY AUTOMATED COUNT: 5.4 X10E9/L (ref 4.4–11.3)
LEUKOCYTES (10*3/UL) IN BLOOD BY AUTOMATED COUNT: 7 X10E9/L (ref 4.4–11.3)
LORAZEPAM SERPL-MCNC: <20 NG/ML
LORAZEPAM UR CFM-MCNC: <25 NG/ML
LYMPHOCYTES (10*3/UL) IN BLOOD BY AUTOMATED COUNT: 1.11 X10E9/L (ref 1.2–4.8)
LYMPHOCYTES (10*3/UL) IN BLOOD BY AUTOMATED COUNT: 1.3 X10E9/L (ref 1.2–4.8)
LYMPHOCYTES (10*3/UL) IN BLOOD BY AUTOMATED COUNT: 1.87 X10E9/L (ref 1.2–4.8)
LYMPHOCYTES/100 LEUKOCYTES IN BLOOD BY AUTOMATED COUNT: 22.8 % (ref 13–44)
LYMPHOCYTES/100 LEUKOCYTES IN BLOOD BY AUTOMATED COUNT: 24.8 % (ref 13–44)
LYMPHOCYTES/100 LEUKOCYTES IN BLOOD BY AUTOMATED COUNT: 26.8 % (ref 13–44)
MAGNESIUM (MG/DL) IN SER/PLAS: 2.1 MG/DL (ref 1.6–2.4)
MAGNESIUM (MG/DL) IN SER/PLAS: 2.1 MG/DL (ref 1.6–2.4)
MAGNESIUM (MG/DL) IN SER/PLAS: 2.32 MG/DL (ref 1.6–2.4)
MAGNESIUM SERPL-MCNC: 1.92 MG/DL (ref 1.6–2.4)
MAGNESIUM SERPL-MCNC: 1.97 MG/DL (ref 1.6–2.4)
MAGNESIUM SERPL-MCNC: 2.08 MG/DL (ref 1.6–2.4)
MAGNESIUM SERPL-MCNC: 2.09 MG/DL (ref 1.6–2.4)
MAGNESIUM SERPL-MCNC: 2.1 MG/DL (ref 1.6–2.4)
MAGNESIUM SERPL-MCNC: 2.19 MG/DL (ref 1.6–2.4)
MAGNESIUM SERPL-MCNC: 2.21 MG/DL (ref 1.6–2.4)
MAGNESIUM SERPL-MCNC: 2.32 MG/DL (ref 1.6–2.4)
MAGNESIUM SERPL-MCNC: 2.35 MG/DL (ref 1.6–2.4)
MAGNESIUM SERPL-MCNC: 2.38 MG/DL (ref 1.6–2.4)
MAGNESIUM SERPL-MCNC: 2.45 MG/DL (ref 1.6–2.4)
MAGNESIUM SERPL-MCNC: 2.46 MG/DL (ref 1.6–2.4)
MCH RBC QN AUTO: 24.9 PG (ref 26–34)
MCH RBC QN AUTO: 24.9 PG (ref 26–34)
MCH RBC QN AUTO: 25 PG (ref 26–34)
MCH RBC QN AUTO: 25 PG (ref 26–34)
MCH RBC QN AUTO: 25.1 PG (ref 26–34)
MCH RBC QN AUTO: 25.1 PG (ref 26–34)
MCH RBC QN AUTO: 25.2 PG (ref 26–34)
MCH RBC QN AUTO: 25.4 PG (ref 26–34)
MCH RBC QN AUTO: 25.4 PG (ref 26–34)
MCH RBC QN AUTO: 25.7 PG (ref 26–34)
MCHC RBC AUTO-ENTMCNC: 30.4 G/DL (ref 32–36)
MCHC RBC AUTO-ENTMCNC: 31 G/DL (ref 32–36)
MCHC RBC AUTO-ENTMCNC: 31.1 G/DL (ref 32–36)
MCHC RBC AUTO-ENTMCNC: 31.2 G/DL (ref 32–36)
MCHC RBC AUTO-ENTMCNC: 31.4 G/DL (ref 32–36)
MCHC RBC AUTO-ENTMCNC: 31.4 G/DL (ref 32–36)
MCHC RBC AUTO-ENTMCNC: 32.1 G/DL (ref 32–36)
MCHC RBC AUTO-ENTMCNC: 32.2 G/DL (ref 32–36)
MCHC RBC AUTO-ENTMCNC: 32.4 G/DL (ref 32–36)
MCHC RBC AUTO-ENTMCNC: 32.7 G/DL (ref 32–36)
MCV RBC AUTO: 77 FL (ref 80–100)
MCV RBC AUTO: 78 FL (ref 80–100)
MCV RBC AUTO: 78 FL (ref 80–100)
MCV RBC AUTO: 79 FL (ref 80–100)
MCV RBC AUTO: 80 FL (ref 80–100)
MCV RBC AUTO: 81 FL (ref 80–100)
MCV RBC AUTO: 82 FL (ref 80–100)
MCV RBC AUTO: 83 FL (ref 80–100)
METHADONE SERPL QL SCN: NEGATIVE NG/ML
METHADONE UR CFM-MCNC: <25 NG/ML
METHAMPHET SERPL QL: NEGATIVE NG/ML
METHGB MFR BLDA: 0.4 % (ref 0–1.5)
MIDAZOLAM SERPL-MCNC: <20 NG/ML
MIDAZOLAM UR CFM-MCNC: <25 NG/ML
MONOCYTES (10*3/UL) IN BLOOD BY AUTOMATED COUNT: 0.47 X10E9/L (ref 0.1–1)
MONOCYTES (10*3/UL) IN BLOOD BY AUTOMATED COUNT: 0.52 X10E9/L (ref 0.1–1)
MONOCYTES (10*3/UL) IN BLOOD BY AUTOMATED COUNT: 0.64 X10E9/L (ref 0.1–1)
MONOCYTES/100 LEUKOCYTES IN BLOOD BY AUTOMATED COUNT: 9.2 % (ref 2–10)
MONOCYTES/100 LEUKOCYTES IN BLOOD BY AUTOMATED COUNT: 9.7 % (ref 2–10)
MONOCYTES/100 LEUKOCYTES IN BLOOD BY AUTOMATED COUNT: 9.9 % (ref 2–10)
MORPHINE SERPL-MCNC: <2 NG/ML
MORPHINE UR CFM-MCNC: <50 NG/ML
NATRIURETIC PEPTIDE B (PG/ML) IN SER/PLAS: 119 PG/ML (ref 0–99)
NATRIURETIC PEPTIDE B (PG/ML) IN SER/PLAS: NORMAL
NEUTROPHILS (10*3/UL) IN BLOOD BY AUTOMATED COUNT: 2.98 X10E9/L (ref 1.2–7.7)
NEUTROPHILS (10*3/UL) IN BLOOD BY AUTOMATED COUNT: 3.11 X10E9/L (ref 1.2–7.7)
NEUTROPHILS (10*3/UL) IN BLOOD BY AUTOMATED COUNT: 4.07 X10E9/L (ref 1.2–7.7)
NEUTROPHILS/100 LEUKOCYTES IN BLOOD BY AUTOMATED COUNT: 58.1 % (ref 40–80)
NEUTROPHILS/100 LEUKOCYTES IN BLOOD BY AUTOMATED COUNT: 59.1 % (ref 40–80)
NEUTROPHILS/100 LEUKOCYTES IN BLOOD BY AUTOMATED COUNT: 61.4 % (ref 40–80)
NICOTINE UR-MCNC: <15 NG/ML
NITRITE UR QL STRIP.AUTO: NEGATIVE
NORDIAZEPAM SERPL-MCNC: <20 NG/ML
NORDIAZEPAM UR CFM-MCNC: <25 NG/ML
NORFENTANYL UR CFM-MCNC: <2.5 NG/ML
NORHYDROCODONE UR CFM-MCNC: <25 NG/ML
NOROXYCODONE UR CFM-MCNC: 251 NG/ML
NORTRAMADOL UR-MCNC: <50 NG/ML
NRBC (PER 100 WBCS) BY AUTOMATED COUNT: 0 /100 WBC (ref 0–0)
NRBC BLD-RTO: 0 /100 WBCS (ref 0–0)
OPIATES SERPL QL SCN: NEGATIVE NG/ML
OPIATES UR QL SCN: ABNORMAL
OXAZEPAM SERPL CFM-MCNC: <20 NG/ML
OXAZEPAM UR CFM-MCNC: <25 NG/ML
OXYCODONE SERPL QL: POSITIVE NG/ML
OXYCODONE SERPL-MCNC: 11 NG/ML
OXYCODONE UR CFM-MCNC: 409 NG/ML
OXYCODONE+OXYMORPHONE UR QL SCN: ABNORMAL
OXYHGB MFR BLDA: 92.7 % (ref 94–98)
OXYHGB MFR BLDMV: 45.5 % (ref 45–75)
OXYHGB MFR BLDMV: 48.1 % (ref 45–75)
OXYHGB MFR BLDMV: 48.4 % (ref 45–75)
OXYHGB MFR BLDMV: 51.5 % (ref 45–75)
OXYHGB MFR BLDMV: 55.6 % (ref 45–75)
OXYHGB MFR BLDMV: 56.3 % (ref 45–75)
OXYHGB MFR BLDMV: 56.7 % (ref 45–75)
OXYHGB MFR BLDMV: 57.1 % (ref 45–75)
OXYHGB MFR BLDMV: 58.4 % (ref 45–75)
OXYHGB MFR BLDMV: 60 % (ref 45–75)
OXYHGB MFR BLDMV: 63.8 % (ref 45–75)
OXYHGB MFR BLDMV: 67.7 % (ref 45–75)
OXYHGB MFR BLDMV: 70.3 % (ref 45–75)
OXYHGB MFR BLDMV: 71.2 % (ref 45–75)
OXYHGB MFR BLDV: 71.3 % (ref 45–75)
OXYMORPHONE SERPL-MCNC: <2 NG/ML
OXYMORPHONE UR CFM-MCNC: 76 NG/ML
P AXIS: 21 DEGREES
P AXIS: 23 DEGREES
P AXIS: 23 DEGREES
P OFFSET: 185 MS
P OFFSET: 187 MS
P OFFSET: 191 MS
P ONSET: 129 MS
P ONSET: 137 MS
P ONSET: 145 MS
PATIENT TEMPERATURE: 37 DEGREES
PATIENT TEMPERATURE: 37 DEGREES C
PCO2 BLDA: 44 MM HG (ref 38–42)
PCO2 BLDMV: 40 MM HG (ref 41–51)
PCO2 BLDMV: 53 MM HG (ref 41–51)
PCO2 BLDMV: 53 MM HG (ref 41–51)
PCO2 BLDMV: 54 MM HG (ref 41–51)
PCO2 BLDMV: 55 MM HG (ref 41–51)
PCO2 BLDMV: 55 MM HG (ref 41–51)
PCO2 BLDMV: 57 MM HG (ref 41–51)
PCO2 BLDMV: 57 MM HG (ref 41–51)
PCO2 BLDMV: 58 MM HG (ref 41–51)
PCO2 BLDMV: 58 MM HG (ref 41–51)
PCO2 BLDMV: 60 MM HG (ref 41–51)
PCO2 BLDMV: 62 MM HG (ref 41–51)
PCO2 BLDV: 54 MM HG (ref 41–51)
PCP SERPL QL SCN: NEGATIVE NG/ML
PCP UR QL SCN: ABNORMAL
PH BLDA: 7.45 PH (ref 7.38–7.42)
PH BLDMV: 7.31 PH (ref 7.33–7.43)
PH BLDMV: 7.32 PH (ref 7.33–7.43)
PH BLDMV: 7.32 PH (ref 7.33–7.43)
PH BLDMV: 7.33 PH (ref 7.33–7.43)
PH BLDMV: 7.33 PH (ref 7.33–7.43)
PH BLDMV: 7.34 PH (ref 7.33–7.43)
PH BLDMV: 7.35 PH (ref 7.33–7.43)
PH BLDMV: 7.35 PH (ref 7.33–7.43)
PH BLDMV: 7.37 PH (ref 7.33–7.43)
PH BLDMV: 7.38 PH (ref 7.33–7.43)
PH BLDMV: 7.38 PH (ref 7.33–7.43)
PH BLDMV: 7.41 PH (ref 7.33–7.43)
PH BLDV: 7.33 PH (ref 7.33–7.43)
PH UR STRIP.AUTO: 6 [PH]
PHOSPHATE (MG/DL) IN SER/PLAS: 3.8 MG/DL (ref 2.5–4.9)
PHOSPHATE (MG/DL) IN SER/PLAS: 4.1 MG/DL (ref 2.5–4.9)
PHOSPHATE (MG/DL) IN SER/PLAS: 4.2 MG/DL (ref 2.5–4.9)
PHOSPHATE (MG/DL) IN SER/PLAS: 4.2 MG/DL (ref 2.5–4.9)
PHOSPHATE (MG/DL) IN SER/PLAS: 4.6 MG/DL (ref 2.5–4.9)
PHOSPHATE SERPL-MCNC: 3.9 MG/DL (ref 2.5–4.9)
PHOSPHATE SERPL-MCNC: 4 MG/DL (ref 2.5–4.9)
PHOSPHATE SERPL-MCNC: 4.1 MG/DL (ref 2.5–4.9)
PHOSPHATE SERPL-MCNC: 4.3 MG/DL (ref 2.5–4.9)
PHOSPHATE SERPL-MCNC: 4.7 MG/DL (ref 2.5–4.9)
PHOSPHATE SERPL-MCNC: 4.7 MG/DL (ref 2.5–4.9)
PHOSPHATE SERPL-MCNC: 4.8 MG/DL (ref 2.5–4.9)
PHOSPHATE SERPL-MCNC: 5 MG/DL (ref 2.5–4.9)
PHOSPHATE SERPL-MCNC: 5.1 MG/DL (ref 2.5–4.9)
PHOSPHATE SERPL-MCNC: 5.2 MG/DL (ref 2.5–4.9)
PHOSPHATE SERPL-MCNC: 5.4 MG/DL (ref 2.5–4.9)
PHOSPHATE SERPL-MCNC: 5.5 MG/DL (ref 2.5–4.9)
PLATELET # BLD AUTO: 242 X10*3/UL (ref 150–450)
PLATELET # BLD AUTO: 260 X10*3/UL (ref 150–450)
PLATELET # BLD AUTO: 266 X10*3/UL (ref 150–450)
PLATELET # BLD AUTO: 271 X10*3/UL (ref 150–450)
PLATELET # BLD AUTO: 275 X10*3/UL (ref 150–450)
PLATELET # BLD AUTO: 276 X10*3/UL (ref 150–450)
PLATELET # BLD AUTO: 283 X10*3/UL (ref 150–450)
PLATELET # BLD AUTO: 293 X10*3/UL (ref 150–450)
PLATELET # BLD AUTO: 304 X10*3/UL (ref 150–450)
PLATELET # BLD AUTO: 314 X10*3/UL (ref 150–450)
PLATELET # BLD AUTO: 316 X10*3/UL (ref 150–450)
PLATELET # BLD AUTO: 362 X10*3/UL (ref 150–450)
PLATELETS (10*3/UL) IN BLOOD AUTOMATED COUNT: 256 X10E9/L (ref 150–450)
PLATELETS (10*3/UL) IN BLOOD AUTOMATED COUNT: 268 X10E9/L (ref 150–450)
PLATELETS (10*3/UL) IN BLOOD AUTOMATED COUNT: 278 X10E9/L (ref 150–450)
PLATELETS (10*3/UL) IN BLOOD AUTOMATED COUNT: 288 X10E9/L (ref 150–450)
PLATELETS (10*3/UL) IN BLOOD AUTOMATED COUNT: 298 X10E9/L (ref 150–450)
PMV BLD AUTO: 10 FL (ref 7.5–11.5)
PMV BLD AUTO: 8.7 FL (ref 7.5–11.5)
PMV BLD AUTO: 9.1 FL (ref 7.5–11.5)
PMV BLD AUTO: 9.2 FL (ref 7.5–11.5)
PMV BLD AUTO: 9.2 FL (ref 7.5–11.5)
PMV BLD AUTO: 9.4 FL (ref 7.5–11.5)
PMV BLD AUTO: 9.4 FL (ref 7.5–11.5)
PMV BLD AUTO: 9.5 FL (ref 7.5–11.5)
PO2 BLDA: 70 MM HG (ref 85–95)
PO2 BLDMV: 37 MM HG (ref 35–45)
PO2 BLDMV: 40 MM HG (ref 35–45)
PO2 BLDMV: 40 MM HG (ref 35–45)
PO2 BLDMV: 41 MM HG (ref 35–45)
PO2 BLDMV: 41 MM HG (ref 35–45)
PO2 BLDMV: 42 MM HG (ref 35–45)
PO2 BLDMV: 43 MM HG (ref 35–45)
PO2 BLDMV: 44 MM HG (ref 35–45)
PO2 BLDMV: 46 MM HG (ref 35–45)
PO2 BLDMV: 49 MM HG (ref 35–45)
PO2 BLDMV: 50 MM HG (ref 35–45)
PO2 BLDMV: 51 MM HG (ref 35–45)
PO2 BLDV: 50 MM HG (ref 35–45)
POCT ANION GAP, MIXED: 7 MMOL/L (ref 10–25)
POCT ANION GAP, VENOUS: 11 MMOL/L (ref 10–25)
POCT ANION GAP, VENOUS: 11 MMOL/L (ref 10–25)
POCT ANION GAP, VENOUS: 8 MMOL/L (ref 10–25)
POCT BASE EXCESS, MIXED: 3 MMOL/L
POCT BASE EXCESS, VENOUS: 2.2 MMOL/L (ref -2–3)
POCT BASE EXCESS, VENOUS: 3.2 MMOL/L (ref -2–3)
POCT BASE EXCESS, VENOUS: 3.6 MMOL/L (ref -2–3)
POCT BASE EXCESS, VENOUS: 3.6 MMOL/L (ref -2–3)
POCT BASE EXCESS, VENOUS: 4 MMOL/L (ref -2–3)
POCT BASE EXCESS, VENOUS: 5.9 MMOL/L (ref -2–3)
POCT CHLORIDE, MIXED: 102 MMOL/L (ref 98–107)
POCT CHLORIDE, VENOUS: 100 MMOL/L (ref 98–107)
POCT CHLORIDE, VENOUS: 100 MMOL/L (ref 98–107)
POCT CHLORIDE, VENOUS: 103 MMOL/L (ref 98–107)
POCT GLUCOSE, MIXED: 138 MG/DL (ref 74–99)
POCT GLUCOSE, VENOUS: 154 MG/DL (ref 74–99)
POCT GLUCOSE, VENOUS: 170 MG/DL (ref 74–99)
POCT GLUCOSE, VENOUS: 191 MG/DL (ref 74–99)
POCT HCO3, MIXED: 29.4 MMOL/L
POCT HCO3, VENOUS: 29.7 MMOL/L (ref 22–26)
POCT HCO3, VENOUS: 29.7 MMOL/L (ref 22–26)
POCT HCO3, VENOUS: 30.1 MMOL/L (ref 22–26)
POCT HCO3, VENOUS: 30.6 MMOL/L (ref 22–26)
POCT HCO3, VENOUS: 30.6 MMOL/L (ref 22–26)
POCT HCO3, VENOUS: 31.7 MMOL/L (ref 22–26)
POCT HEMATOCRIT, MIXED: 37 % (ref 41–52)
POCT HEMATOCRIT, VENOUS: 36 % (ref 41–52)
POCT HEMATOCRIT, VENOUS: 38 % (ref 41–52)
POCT HEMATOCRIT, VENOUS: 41 % (ref 41–52)
POCT HEMOGLOBIN, MIXED: 12.2 G/DL (ref 13.5–17.5)
POCT HEMOGLOBIN, VENOUS: 12 G/DL (ref 13.5–17.5)
POCT HEMOGLOBIN, VENOUS: 12.8 G/DL (ref 13.5–17.5)
POCT HEMOGLOBIN, VENOUS: 13.6 G/DL (ref 13.5–17.5)
POCT IONIZED CALCIUM, MIXED: 1.2 MMOL/L (ref 1.1–1.33)
POCT IONIZED CALCIUM, VENOUS: 1.1 MMOL/L (ref 1.1–1.33)
POCT IONIZED CALCIUM, VENOUS: 1.12 MMOL/L (ref 1.1–1.33)
POCT IONIZED CALCIUM, VENOUS: 1.17 MMOL/L (ref 1.1–1.33)
POCT LACTATE, MIXED: 0.6 MMOL/L (ref 0.4–2)
POCT LACTATE, VENOUS: 1.6 MMOL/L (ref 0.4–2)
POCT LACTATE, VENOUS: 1.7 MMOL/L (ref 0.4–2)
POCT LACTATE, VENOUS: 1.7 MMOL/L (ref 0.4–2)
POCT LACTATE, VENOUS: 2.1 MMOL/L (ref 0.4–2)
POCT OXY HEMOGLOBIN, MIXED: 61.9 % (ref 45–75)
POCT OXY HEMOGLOBIN, VENOUS: 31.1 % (ref 45–75)
POCT OXY HEMOGLOBIN, VENOUS: 47.2 % (ref 45–75)
POCT OXY HEMOGLOBIN, VENOUS: 58.5 % (ref 45–75)
POCT OXY HEMOGLOBIN, VENOUS: 60.8 % (ref 45–75)
POCT OXY HEMOGLOBIN, VENOUS: 71.1 % (ref 45–75)
POCT OXY HEMOGLOBIN, VENOUS: 71.3 % (ref 45–75)
POCT PCO2, MIXED: 52 MMHG
POCT PCO2, VENOUS: 39 MMHG (ref 41–51)
POCT PCO2, VENOUS: 52 MMHG (ref 41–51)
POCT PCO2, VENOUS: 53 MMHG (ref 41–51)
POCT PCO2, VENOUS: 58 MMHG (ref 41–51)
POCT PCO2, VENOUS: 59 MMHG (ref 41–51)
POCT PCO2, VENOUS: 63 MMHG (ref 41–51)
POCT PH, MIXED: 7.36
POCT PH, VENOUS: 7.31 (ref 7.33–7.43)
POCT PH, VENOUS: 7.31 (ref 7.33–7.43)
POCT PH, VENOUS: 7.33 (ref 7.33–7.43)
POCT PH, VENOUS: 7.37 (ref 7.33–7.43)
POCT PH, VENOUS: 7.37 (ref 7.33–7.43)
POCT PH, VENOUS: 7.49 (ref 7.33–7.43)
POCT PO2, MIXED: 41 MMHG
POCT PO2, VENOUS: 27 MMHG (ref 35–45)
POCT PO2, VENOUS: 35 MMHG (ref 35–45)
POCT PO2, VENOUS: 41 MMHG (ref 35–45)
POCT PO2, VENOUS: 42 MMHG (ref 35–45)
POCT PO2, VENOUS: 43 MMHG (ref 35–45)
POCT PO2, VENOUS: 47 MMHG (ref 35–45)
POCT POTASSIUM, MIXED: 4.2 MMOL/L (ref 3.5–5.3)
POCT POTASSIUM, VENOUS: 3.7 MMOL/L (ref 3.5–5.3)
POCT POTASSIUM, VENOUS: 4.3 MMOL/L (ref 3.5–5.3)
POCT POTASSIUM, VENOUS: 4.7 MMOL/L (ref 3.5–5.3)
POCT SO2, MIXED: 63 %
POCT SO2, VENOUS: 32 % (ref 45–75)
POCT SO2, VENOUS: 48 % (ref 45–75)
POCT SO2, VENOUS: 60 % (ref 45–75)
POCT SO2, VENOUS: 63 % (ref 45–75)
POCT SO2, VENOUS: 73 % (ref 45–75)
POCT SO2, VENOUS: 74 % (ref 45–75)
POCT SODIUM, MIXED: 134 MMOL/L (ref 136–145)
POCT SODIUM, VENOUS: 136 MMOL/L (ref 136–145)
POCT SODIUM, VENOUS: 137 MMOL/L (ref 136–145)
POCT SODIUM, VENOUS: 138 MMOL/L (ref 136–145)
POTASSIUM (MMOL/L) IN SER/PLAS: 3.5 MMOL/L (ref 3.5–5.3)
POTASSIUM (MMOL/L) IN SER/PLAS: 3.7 MMOL/L (ref 3.5–5.3)
POTASSIUM (MMOL/L) IN SER/PLAS: 3.8 MMOL/L (ref 3.5–5.3)
POTASSIUM (MMOL/L) IN SER/PLAS: 3.9 MMOL/L (ref 3.5–5.3)
POTASSIUM (MMOL/L) IN SER/PLAS: 4 MMOL/L (ref 3.5–5.3)
POTASSIUM (MMOL/L) IN SER/PLAS: 4.3 MMOL/L (ref 3.5–5.3)
POTASSIUM (MMOL/L) IN SER/PLAS: 4.4 MMOL/L (ref 3.5–5.3)
POTASSIUM BLDMV-SCNC: 4.1 MMOL/L (ref 3.5–5.3)
POTASSIUM BLDMV-SCNC: 4.6 MMOL/L (ref 3.5–5.3)
POTASSIUM BLDV-SCNC: 4.8 MMOL/L (ref 3.5–5.3)
POTASSIUM SERPL-SCNC: 3.4 MMOL/L (ref 3.5–5.3)
POTASSIUM SERPL-SCNC: 3.7 MMOL/L (ref 3.5–5.3)
POTASSIUM SERPL-SCNC: 3.7 MMOL/L (ref 3.5–5.3)
POTASSIUM SERPL-SCNC: 3.8 MMOL/L (ref 3.5–5.3)
POTASSIUM SERPL-SCNC: 3.9 MMOL/L (ref 3.5–5.3)
POTASSIUM SERPL-SCNC: 3.9 MMOL/L (ref 3.5–5.3)
POTASSIUM SERPL-SCNC: 4.2 MMOL/L (ref 3.5–5.3)
POTASSIUM SERPL-SCNC: 4.3 MMOL/L (ref 3.5–5.3)
POTASSIUM SERPL-SCNC: 4.5 MMOL/L (ref 3.5–5.3)
POTASSIUM SERPL-SCNC: 4.5 MMOL/L (ref 3.5–5.3)
POTASSIUM SERPL-SCNC: 4.6 MMOL/L (ref 3.5–5.3)
POTASSIUM SERPL-SCNC: 4.7 MMOL/L (ref 3.5–5.3)
PR INTERVAL: 136 MS
PR INTERVAL: 152 MS
PR INTERVAL: 162 MS
PREALB SERPL-MCNC: 26.3 MG/DL (ref 18–40)
PROT C AG ACT/NOR PPP IA: 92 % (ref 63–153)
PROT S AG ACT/NOR PPP IA: 133 % (ref 84–134)
PROT SERPL-MCNC: 7.5 G/DL (ref 6.4–8.2)
PROT UR STRIP.AUTO-MCNC: NEGATIVE MG/DL
PROTEIN TOTAL: 7.3 G/DL (ref 6.4–8.2)
PROTEIN TOTAL: 7.7 G/DL (ref 6.4–8.2)
PROTHROMBIN TIME (PT) IN PPP BY COAGULATION ASSAY: 11.4 SEC (ref 9.8–12.8)
PROTHROMBIN TIME (PT) IN PPP BY COAGULATION ASSAY: 11.7 SEC (ref 9.8–12.8)
PROTHROMBIN TIME: 11.4 SECONDS (ref 9.8–12.8)
PROTHROMBIN TIME: 12.5 SECONDS (ref 9.8–12.8)
PSA SERPL-MCNC: 0.21 NG/ML
Q ONSET: 210 MS
Q ONSET: 213 MS
Q ONSET: 213 MS
QRS COUNT: 18 BEATS
QRS COUNT: 18 BEATS
QRS COUNT: 19 BEATS
QRS DURATION: 104 MS
QRS DURATION: 106 MS
QRS DURATION: 114 MS
QT INTERVAL: 366 MS
QT INTERVAL: 366 MS
QT INTERVAL: 376 MS
QTC CALCULATION(BAZETT): 497 MS
QTC CALCULATION(BAZETT): 499 MS
QTC CALCULATION(BAZETT): 520 MS
QTC FREDERICIA: 449 MS
QTC FREDERICIA: 450 MS
QTC FREDERICIA: 466 MS
R AXIS: 38 DEGREES
R AXIS: 88 DEGREES
R AXIS: 94 DEGREES
RBC # BLD AUTO: 4.52 X10*6/UL (ref 4.5–5.9)
RBC # BLD AUTO: 4.68 X10*6/UL (ref 4.5–5.9)
RBC # BLD AUTO: 4.78 X10*6/UL (ref 4.5–5.9)
RBC # BLD AUTO: 5.02 X10*6/UL (ref 4.5–5.9)
RBC # BLD AUTO: 5.07 X10*6/UL (ref 4.5–5.9)
RBC # BLD AUTO: 5.11 X10*6/UL (ref 4.5–5.9)
RBC # BLD AUTO: 5.14 X10*6/UL (ref 4.5–5.9)
RBC # BLD AUTO: 5.23 X10*6/UL (ref 4.5–5.9)
RBC # BLD AUTO: 5.23 X10*6/UL (ref 4.5–5.9)
RBC # BLD AUTO: 5.28 X10*6/UL (ref 4.5–5.9)
RBC # BLD AUTO: 5.4 X10*6/UL (ref 4.5–5.9)
RBC # BLD AUTO: 5.42 X10*6/UL (ref 4.5–5.9)
RBC # UR STRIP.AUTO: NEGATIVE /UL
RH FACTOR (ANTIGEN D): NORMAL
SAO2 % BLDA: 96 % (ref 94–100)
SAO2 % BLDMV: 46 % (ref 45–75)
SAO2 % BLDMV: 49 % (ref 45–75)
SAO2 % BLDMV: 49 % (ref 45–75)
SAO2 % BLDMV: 52 % (ref 45–75)
SAO2 % BLDMV: 57 % (ref 45–75)
SAO2 % BLDMV: 57 % (ref 45–75)
SAO2 % BLDMV: 58 % (ref 45–75)
SAO2 % BLDMV: 59 % (ref 45–75)
SAO2 % BLDMV: 60 % (ref 45–75)
SAO2 % BLDMV: 62 % (ref 45–75)
SAO2 % BLDMV: 65 % (ref 45–75)
SAO2 % BLDMV: 68 % (ref 45–75)
SAO2 % BLDMV: 72 % (ref 45–75)
SAO2 % BLDMV: 73 % (ref 45–75)
SAO2 % BLDV: 73 % (ref 45–75)
SARS-COV-2 RNA SPEC QL NAA+PROBE: NEGATIVE
SODIUM (MMOL/L) IN SER/PLAS: 136 MMOL/L (ref 136–145)
SODIUM (MMOL/L) IN SER/PLAS: 138 MMOL/L (ref 136–145)
SODIUM (MMOL/L) IN SER/PLAS: 140 MMOL/L (ref 136–145)
SODIUM (MMOL/L) IN SER/PLAS: 140 MMOL/L (ref 136–145)
SODIUM (MMOL/L) IN SER/PLAS: 141 MMOL/L (ref 136–145)
SODIUM BLDMV-SCNC: 132 MMOL/L (ref 136–145)
SODIUM BLDMV-SCNC: 135 MMOL/L (ref 136–145)
SODIUM BLDV-SCNC: 128 MMOL/L (ref 136–145)
SODIUM SERPL-SCNC: 132 MMOL/L (ref 136–145)
SODIUM SERPL-SCNC: 133 MMOL/L (ref 136–145)
SODIUM SERPL-SCNC: 134 MMOL/L (ref 136–145)
SODIUM SERPL-SCNC: 136 MMOL/L (ref 136–145)
SODIUM SERPL-SCNC: 137 MMOL/L (ref 136–145)
SODIUM SERPL-SCNC: 137 MMOL/L (ref 136–145)
SODIUM SERPL-SCNC: 139 MMOL/L (ref 136–145)
SODIUM SERPL-SCNC: 139 MMOL/L (ref 136–145)
SODIUM SERPL-SCNC: 140 MMOL/L (ref 136–145)
SODIUM SERPL-SCNC: 140 MMOL/L (ref 136–145)
SP GR UR STRIP.AUTO: 1.01
T AXIS: -29 DEGREES
T AXIS: 25 DEGREES
T AXIS: 42 DEGREES
T OFFSET: 393 MS
T OFFSET: 396 MS
T OFFSET: 401 MS
T3 SERPL-MCNC: 100 NG/DL (ref 60–200)
T4 SERPL-MCNC: 8.4 UG/DL (ref 4.5–11.1)
TEMAZEPAM SERPL-MCNC: <20 NG/ML
TEMAZEPAM UR CFM-MCNC: <25 NG/ML
TIBC SERPL-MCNC: 409 UG/DL (ref 240–445)
TRAMADOL UR CFM-MCNC: <50 NG/ML
TRANS-3-OH-COTININE UR-MCNC: 90 NG/ML
TROPONIN I, HIGH SENSITIVITY: 38 NG/L (ref 0–53)
TROPONIN I, HIGH SENSITIVITY: 40 NG/L (ref 0–53)
TROPONIN I, HIGH SENSITIVITY: 47 NG/L (ref 0–53)
TSH SERPL-ACNC: 2.46 MIU/L (ref 0.44–3.98)
UFH PPP CHRO-ACNC: 0.2 IU/ML
UFH PPP CHRO-ACNC: 0.3 IU/ML
UFH PPP CHRO-ACNC: 0.4 IU/ML
UFH PPP CHRO-ACNC: 0.4 IU/ML
UFH PPP CHRO-ACNC: 0.5 IU/ML
UFH PPP CHRO-ACNC: 0.6 IU/ML
UFH PPP CHRO-ACNC: 0.8 IU/ML
UFH PPP CHRO-ACNC: 1 IU/ML
UFH PPP CHRO-ACNC: <0.1 IU/ML
UIBC SERPL-MCNC: 316 UG/DL (ref 110–370)
UREA NITROGEN (MG/DL) IN SER/PLAS: 15 MG/DL (ref 6–23)
UREA NITROGEN (MG/DL) IN SER/PLAS: 16 MG/DL (ref 6–23)
UREA NITROGEN (MG/DL) IN SER/PLAS: 17 MG/DL (ref 6–23)
UREA NITROGEN (MG/DL) IN SER/PLAS: 17 MG/DL (ref 6–23)
UREA NITROGEN (MG/DL) IN SER/PLAS: 20 MG/DL (ref 6–23)
UREA NITROGEN (MG/DL) IN SER/PLAS: 21 MG/DL (ref 6–23)
UREA NITROGEN (MG/DL) IN SER/PLAS: 22 MG/DL (ref 6–23)
UROBILINOGEN UR STRIP.AUTO-MCNC: <2 MG/DL
VENTRICULAR RATE: 111 BPM
VENTRICULAR RATE: 112 BPM
VENTRICULAR RATE: 115 BPM
VIT B12 SERPL-MCNC: 367 PG/ML (ref 211–911)
WBC # BLD AUTO: 5.1 X10*3/UL (ref 4.4–11.3)
WBC # BLD AUTO: 5.4 X10*3/UL (ref 4.4–11.3)
WBC # BLD AUTO: 5.9 X10*3/UL (ref 4.4–11.3)
WBC # BLD AUTO: 5.9 X10*3/UL (ref 4.4–11.3)
WBC # BLD AUTO: 6 X10*3/UL (ref 4.4–11.3)
WBC # BLD AUTO: 6.1 X10*3/UL (ref 4.4–11.3)
WBC # BLD AUTO: 6.4 X10*3/UL (ref 4.4–11.3)
WBC # BLD AUTO: 6.6 X10*3/UL (ref 4.4–11.3)
WBC # BLD AUTO: 6.9 X10*3/UL (ref 4.4–11.3)
WBC # BLD AUTO: 8.5 X10*3/UL (ref 4.4–11.3)
ZOLPIDEM UR CFM-MCNC: <25 NG/ML
ZOLPIDEM UR-MCNC: <25 NG/ML

## 2023-01-01 PROCEDURE — 82077 ASSAY SPEC XCP UR&BREATH IA: CPT | Mod: CMCLAB | Performed by: STUDENT IN AN ORGANIZED HEALTH CARE EDUCATION/TRAINING PROGRAM

## 2023-01-01 PROCEDURE — 94727 GAS DIL/WSHOT DETER LNG VOL: CPT

## 2023-01-01 PROCEDURE — 4010F ACE/ARB THERAPY RXD/TAKEN: CPT | Performed by: INTERNAL MEDICINE

## 2023-01-01 PROCEDURE — 2500000002 HC RX 250 W HCPCS SELF ADMINISTERED DRUGS (ALT 637 FOR MEDICARE OP, ALT 636 FOR OP/ED): Performed by: STUDENT IN AN ORGANIZED HEALTH CARE EDUCATION/TRAINING PROGRAM

## 2023-01-01 PROCEDURE — 84295 ASSAY OF SERUM SODIUM: CPT | Performed by: STUDENT IN AN ORGANIZED HEALTH CARE EDUCATION/TRAINING PROGRAM

## 2023-01-01 PROCEDURE — 97530 THERAPEUTIC ACTIVITIES: CPT | Mod: GP

## 2023-01-01 PROCEDURE — 2500000001 HC RX 250 WO HCPCS SELF ADMINISTERED DRUGS (ALT 637 FOR MEDICARE OP)

## 2023-01-01 PROCEDURE — 71250 CT THORAX DX C-: CPT | Performed by: RADIOLOGY

## 2023-01-01 PROCEDURE — 82947 ASSAY GLUCOSE BLOOD QUANT: CPT | Mod: CMCLAB

## 2023-01-01 PROCEDURE — 94660 CPAP INITIATION&MGMT: CPT

## 2023-01-01 PROCEDURE — 37799 UNLISTED PX VASCULAR SURGERY: CPT | Performed by: STUDENT IN AN ORGANIZED HEALTH CARE EDUCATION/TRAINING PROGRAM

## 2023-01-01 PROCEDURE — 80307 DRUG TEST PRSMV CHEM ANLYZR: CPT | Mod: CMCLAB | Performed by: STUDENT IN AN ORGANIZED HEALTH CARE EDUCATION/TRAINING PROGRAM

## 2023-01-01 PROCEDURE — 82330 ASSAY OF CALCIUM: CPT | Mod: CMCLAB

## 2023-01-01 PROCEDURE — 84100 ASSAY OF PHOSPHORUS: CPT | Mod: CMCLAB | Performed by: STUDENT IN AN ORGANIZED HEALTH CARE EDUCATION/TRAINING PROGRAM

## 2023-01-01 PROCEDURE — 1200000002 HC GENERAL ROOM WITH TELEMETRY DAILY

## 2023-01-01 PROCEDURE — 71045 X-RAY EXAM CHEST 1 VIEW: CPT | Performed by: RADIOLOGY

## 2023-01-01 PROCEDURE — 85027 COMPLETE CBC AUTOMATED: CPT | Performed by: STUDENT IN AN ORGANIZED HEALTH CARE EDUCATION/TRAINING PROGRAM

## 2023-01-01 PROCEDURE — 83735 ASSAY OF MAGNESIUM: CPT | Performed by: STUDENT IN AN ORGANIZED HEALTH CARE EDUCATION/TRAINING PROGRAM

## 2023-01-01 PROCEDURE — 82947 ASSAY GLUCOSE BLOOD QUANT: CPT

## 2023-01-01 PROCEDURE — 83735 ASSAY OF MAGNESIUM: CPT | Mod: CMCLAB | Performed by: STUDENT IN AN ORGANIZED HEALTH CARE EDUCATION/TRAINING PROGRAM

## 2023-01-01 PROCEDURE — 2020000001 HC ICU ROOM DAILY

## 2023-01-01 PROCEDURE — 85520 HEPARIN ASSAY: CPT | Performed by: STUDENT IN AN ORGANIZED HEALTH CARE EDUCATION/TRAINING PROGRAM

## 2023-01-01 PROCEDURE — 80373 DRUG SCREENING TRAMADOL: CPT

## 2023-01-01 PROCEDURE — 82728 ASSAY OF FERRITIN: CPT | Performed by: STUDENT IN AN ORGANIZED HEALTH CARE EDUCATION/TRAINING PROGRAM

## 2023-01-01 PROCEDURE — 36620 INSERTION CATHETER ARTERY: CPT | Performed by: STUDENT IN AN ORGANIZED HEALTH CARE EDUCATION/TRAINING PROGRAM

## 2023-01-01 PROCEDURE — 85610 PROTHROMBIN TIME: CPT | Performed by: STUDENT IN AN ORGANIZED HEALTH CARE EDUCATION/TRAINING PROGRAM

## 2023-01-01 PROCEDURE — 71046 X-RAY EXAM CHEST 2 VIEWS: CPT

## 2023-01-01 PROCEDURE — 82805 BLOOD GASES W/O2 SATURATION: CPT | Performed by: STUDENT IN AN ORGANIZED HEALTH CARE EDUCATION/TRAINING PROGRAM

## 2023-01-01 PROCEDURE — 3044F HG A1C LEVEL LT 7.0%: CPT | Performed by: INTERNAL MEDICINE

## 2023-01-01 PROCEDURE — 99291 CRITICAL CARE FIRST HOUR: CPT | Performed by: STUDENT IN AN ORGANIZED HEALTH CARE EDUCATION/TRAINING PROGRAM

## 2023-01-01 PROCEDURE — 85730 THROMBOPLASTIN TIME PARTIAL: CPT | Mod: CMCLAB | Performed by: STUDENT IN AN ORGANIZED HEALTH CARE EDUCATION/TRAINING PROGRAM

## 2023-01-01 PROCEDURE — 85520 HEPARIN ASSAY: CPT | Mod: CMCLAB | Performed by: STUDENT IN AN ORGANIZED HEALTH CARE EDUCATION/TRAINING PROGRAM

## 2023-01-01 PROCEDURE — 2500000004 HC RX 250 GENERAL PHARMACY W/ HCPCS (ALT 636 FOR OP/ED): Performed by: NURSE PRACTITIONER

## 2023-01-01 PROCEDURE — 2500000004 HC RX 250 GENERAL PHARMACY W/ HCPCS (ALT 636 FOR OP/ED): Performed by: STUDENT IN AN ORGANIZED HEALTH CARE EDUCATION/TRAINING PROGRAM

## 2023-01-01 PROCEDURE — 85027 COMPLETE CBC AUTOMATED: CPT | Mod: CMCLAB | Performed by: STUDENT IN AN ORGANIZED HEALTH CARE EDUCATION/TRAINING PROGRAM

## 2023-01-01 PROCEDURE — 36415 COLL VENOUS BLD VENIPUNCTURE: CPT | Performed by: STUDENT IN AN ORGANIZED HEALTH CARE EDUCATION/TRAINING PROGRAM

## 2023-01-01 PROCEDURE — 93922 UPR/L XTREMITY ART 2 LEVELS: CPT | Performed by: INTERNAL MEDICINE

## 2023-01-01 PROCEDURE — 80069 RENAL FUNCTION PANEL: CPT | Performed by: STUDENT IN AN ORGANIZED HEALTH CARE EDUCATION/TRAINING PROGRAM

## 2023-01-01 PROCEDURE — 99232 SBSQ HOSP IP/OBS MODERATE 35: CPT | Performed by: INTERNAL MEDICINE

## 2023-01-01 PROCEDURE — 2500000004 HC RX 250 GENERAL PHARMACY W/ HCPCS (ALT 636 FOR OP/ED): Performed by: INTERNAL MEDICINE

## 2023-01-01 PROCEDURE — 80346 BENZODIAZEPINES1-12: CPT

## 2023-01-01 PROCEDURE — 97116 GAIT TRAINING THERAPY: CPT | Mod: GP

## 2023-01-01 PROCEDURE — 2500000004 HC RX 250 GENERAL PHARMACY W/ HCPCS (ALT 636 FOR OP/ED)

## 2023-01-01 PROCEDURE — 80069 RENAL FUNCTION PANEL: CPT | Mod: CMCLAB | Performed by: STUDENT IN AN ORGANIZED HEALTH CARE EDUCATION/TRAINING PROGRAM

## 2023-01-01 PROCEDURE — 3075F SYST BP GE 130 - 139MM HG: CPT | Performed by: INTERNAL MEDICINE

## 2023-01-01 PROCEDURE — 37799 UNLISTED PX VASCULAR SURGERY: CPT | Mod: CMCLAB | Performed by: STUDENT IN AN ORGANIZED HEALTH CARE EDUCATION/TRAINING PROGRAM

## 2023-01-01 PROCEDURE — 93880 EXTRACRANIAL BILAT STUDY: CPT

## 2023-01-01 PROCEDURE — 99498 ADVNCD CARE PLAN ADDL 30 MIN: CPT

## 2023-01-01 PROCEDURE — 2500000001 HC RX 250 WO HCPCS SELF ADMINISTERED DRUGS (ALT 637 FOR MEDICARE OP): Performed by: STUDENT IN AN ORGANIZED HEALTH CARE EDUCATION/TRAINING PROGRAM

## 2023-01-01 PROCEDURE — 85027 COMPLETE CBC AUTOMATED: CPT | Mod: CMCLAB

## 2023-01-01 PROCEDURE — 82805 BLOOD GASES W/O2 SATURATION: CPT | Mod: CMCLAB | Performed by: STUDENT IN AN ORGANIZED HEALTH CARE EDUCATION/TRAINING PROGRAM

## 2023-01-01 PROCEDURE — 99291 CRITICAL CARE FIRST HOUR: CPT | Performed by: INTERNAL MEDICINE

## 2023-01-01 PROCEDURE — 71250 CT THORAX DX C-: CPT

## 2023-01-01 PROCEDURE — 93290 INTERROG DEV EVAL ICPMS IP: CPT | Performed by: INTERNAL MEDICINE

## 2023-01-01 PROCEDURE — 4A133B3 MONITORING OF ARTERIAL PRESSURE, PULMONARY, PERCUTANEOUS APPROACH: ICD-10-PCS | Performed by: STUDENT IN AN ORGANIZED HEALTH CARE EDUCATION/TRAINING PROGRAM

## 2023-01-01 PROCEDURE — 36600 WITHDRAWAL OF ARTERIAL BLOOD: CPT

## 2023-01-01 PROCEDURE — 86803 HEPATITIS C AB TEST: CPT | Performed by: STUDENT IN AN ORGANIZED HEALTH CARE EDUCATION/TRAINING PROGRAM

## 2023-01-01 PROCEDURE — 86708 HEPATITIS A ANTIBODY: CPT | Performed by: STUDENT IN AN ORGANIZED HEALTH CARE EDUCATION/TRAINING PROGRAM

## 2023-01-01 PROCEDURE — 83735 ASSAY OF MAGNESIUM: CPT | Mod: CMCLAB

## 2023-01-01 PROCEDURE — 84702 CHORIONIC GONADOTROPIN TEST: CPT | Mod: CMCLAB | Performed by: STUDENT IN AN ORGANIZED HEALTH CARE EDUCATION/TRAINING PROGRAM

## 2023-01-01 PROCEDURE — 2500000001 HC RX 250 WO HCPCS SELF ADMINISTERED DRUGS (ALT 637 FOR MEDICARE OP): Performed by: NURSE PRACTITIONER

## 2023-01-01 PROCEDURE — 99232 SBSQ HOSP IP/OBS MODERATE 35: CPT

## 2023-01-01 PROCEDURE — 80365 DRUG SCREENING OXYCODONE: CPT

## 2023-01-01 PROCEDURE — 2580000001 HC RX 258 IV SOLUTIONS

## 2023-01-01 PROCEDURE — 84436 ASSAY OF TOTAL THYROXINE: CPT | Performed by: STUDENT IN AN ORGANIZED HEALTH CARE EDUCATION/TRAINING PROGRAM

## 2023-01-01 PROCEDURE — 90791 PSYCH DIAGNOSTIC EVALUATION: CPT | Performed by: PSYCHOLOGIST

## 2023-01-01 PROCEDURE — 71045 X-RAY EXAM CHEST 1 VIEW: CPT | Mod: FY

## 2023-01-01 PROCEDURE — 84484 ASSAY OF TROPONIN QUANT: CPT | Performed by: STUDENT IN AN ORGANIZED HEALTH CARE EDUCATION/TRAINING PROGRAM

## 2023-01-01 PROCEDURE — 80069 RENAL FUNCTION PANEL: CPT

## 2023-01-01 PROCEDURE — 80364 OPIOID &OPIATE ANALOG 5/MORE: CPT | Performed by: STUDENT IN AN ORGANIZED HEALTH CARE EDUCATION/TRAINING PROGRAM

## 2023-01-01 PROCEDURE — 99232 SBSQ HOSP IP/OBS MODERATE 35: CPT | Performed by: NURSE PRACTITIONER

## 2023-01-01 PROCEDURE — 99291 CRITICAL CARE FIRST HOUR: CPT | Performed by: NURSE PRACTITIONER

## 2023-01-01 PROCEDURE — 99214 OFFICE O/P EST MOD 30 MIN: CPT | Performed by: INTERNAL MEDICINE

## 2023-01-01 PROCEDURE — 86706 HEP B SURFACE ANTIBODY: CPT | Mod: CMCLAB | Performed by: STUDENT IN AN ORGANIZED HEALTH CARE EDUCATION/TRAINING PROGRAM

## 2023-01-01 PROCEDURE — 99497 ADVNCD CARE PLAN 30 MIN: CPT

## 2023-01-01 PROCEDURE — 83540 ASSAY OF IRON: CPT | Performed by: STUDENT IN AN ORGANIZED HEALTH CARE EDUCATION/TRAINING PROGRAM

## 2023-01-01 PROCEDURE — 85302 CLOT INHIBIT PROT C ANTIGEN: CPT | Mod: CMCLAB | Performed by: STUDENT IN AN ORGANIZED HEALTH CARE EDUCATION/TRAINING PROGRAM

## 2023-01-01 PROCEDURE — 85610 PROTHROMBIN TIME: CPT | Mod: CMCLAB | Performed by: STUDENT IN AN ORGANIZED HEALTH CARE EDUCATION/TRAINING PROGRAM

## 2023-01-01 PROCEDURE — 84443 ASSAY THYROID STIM HORMONE: CPT | Performed by: STUDENT IN AN ORGANIZED HEALTH CARE EDUCATION/TRAINING PROGRAM

## 2023-01-01 PROCEDURE — 84480 ASSAY TRIIODOTHYRONINE (T3): CPT | Mod: CMCLAB | Performed by: STUDENT IN AN ORGANIZED HEALTH CARE EDUCATION/TRAINING PROGRAM

## 2023-01-01 PROCEDURE — 82565 ASSAY OF CREATININE: CPT | Mod: CMCLAB

## 2023-01-01 PROCEDURE — 3008F BODY MASS INDEX DOCD: CPT | Performed by: INTERNAL MEDICINE

## 2023-01-01 PROCEDURE — 96372 THER/PROPH/DIAG INJ SC/IM: CPT | Performed by: STUDENT IN AN ORGANIZED HEALTH CARE EDUCATION/TRAINING PROGRAM

## 2023-01-01 PROCEDURE — 99222 1ST HOSP IP/OBS MODERATE 55: CPT | Performed by: NURSE PRACTITIONER

## 2023-01-01 PROCEDURE — 2500000005 HC RX 250 GENERAL PHARMACY W/O HCPCS

## 2023-01-01 PROCEDURE — 84132 ASSAY OF SERUM POTASSIUM: CPT

## 2023-01-01 PROCEDURE — 2500000005 HC RX 250 GENERAL PHARMACY W/O HCPCS: Performed by: STUDENT IN AN ORGANIZED HEALTH CARE EDUCATION/TRAINING PROGRAM

## 2023-01-01 PROCEDURE — 82375 ASSAY CARBOXYHB QUANT: CPT | Mod: CMCLAB

## 2023-01-01 PROCEDURE — 87075 CULTR BACTERIA EXCEPT BLOOD: CPT | Mod: CMCLAB

## 2023-01-01 PROCEDURE — 02HP32Z INSERTION OF MONITORING DEVICE INTO PULMONARY TRUNK, PERCUTANEOUS APPROACH: ICD-10-PCS | Performed by: STUDENT IN AN ORGANIZED HEALTH CARE EDUCATION/TRAINING PROGRAM

## 2023-01-01 PROCEDURE — 82040 ASSAY OF SERUM ALBUMIN: CPT | Mod: CMCLAB | Performed by: STUDENT IN AN ORGANIZED HEALTH CARE EDUCATION/TRAINING PROGRAM

## 2023-01-01 PROCEDURE — 84100 ASSAY OF PHOSPHORUS: CPT | Performed by: STUDENT IN AN ORGANIZED HEALTH CARE EDUCATION/TRAINING PROGRAM

## 2023-01-01 PROCEDURE — 76700 US EXAM ABDOM COMPLETE: CPT

## 2023-01-01 PROCEDURE — 87389 HIV-1 AG W/HIV-1&-2 AB AG IA: CPT | Performed by: STUDENT IN AN ORGANIZED HEALTH CARE EDUCATION/TRAINING PROGRAM

## 2023-01-01 PROCEDURE — 86900 BLOOD TYPING SEROLOGIC ABO: CPT | Performed by: STUDENT IN AN ORGANIZED HEALTH CARE EDUCATION/TRAINING PROGRAM

## 2023-01-01 PROCEDURE — 76700 US EXAM ABDOM COMPLETE: CPT | Performed by: RADIOLOGY

## 2023-01-01 PROCEDURE — 93503 INSERT/PLACE HEART CATHETER: CPT | Performed by: STUDENT IN AN ORGANIZED HEALTH CARE EDUCATION/TRAINING PROGRAM

## 2023-01-01 PROCEDURE — 93880 EXTRACRANIAL BILAT STUDY: CPT | Performed by: INTERNAL MEDICINE

## 2023-01-01 PROCEDURE — 94640 AIRWAY INHALATION TREATMENT: CPT

## 2023-01-01 PROCEDURE — 80053 COMPREHEN METABOLIC PANEL: CPT | Performed by: STUDENT IN AN ORGANIZED HEALTH CARE EDUCATION/TRAINING PROGRAM

## 2023-01-01 PROCEDURE — 76937 US GUIDE VASCULAR ACCESS: CPT | Performed by: STUDENT IN AN ORGANIZED HEALTH CARE EDUCATION/TRAINING PROGRAM

## 2023-01-01 PROCEDURE — 80307 DRUG TEST PRSMV CHEM ANLYZR: CPT | Performed by: STUDENT IN AN ORGANIZED HEALTH CARE EDUCATION/TRAINING PROGRAM

## 2023-01-01 PROCEDURE — RXMED WILLOW AMBULATORY MEDICATION CHARGE

## 2023-01-01 PROCEDURE — 80346 BENZODIAZEPINES1-12: CPT | Performed by: STUDENT IN AN ORGANIZED HEALTH CARE EDUCATION/TRAINING PROGRAM

## 2023-01-01 PROCEDURE — 83605 ASSAY OF LACTIC ACID: CPT | Performed by: STUDENT IN AN ORGANIZED HEALTH CARE EDUCATION/TRAINING PROGRAM

## 2023-01-01 PROCEDURE — 71046 X-RAY EXAM CHEST 2 VIEWS: CPT | Performed by: RADIOLOGY

## 2023-01-01 PROCEDURE — 82607 VITAMIN B-12: CPT | Performed by: STUDENT IN AN ORGANIZED HEALTH CARE EDUCATION/TRAINING PROGRAM

## 2023-01-01 PROCEDURE — 80358 DRUG SCREENING METHADONE: CPT

## 2023-01-01 PROCEDURE — 70355 PANORAMIC X-RAY OF JAWS: CPT | Mod: FY

## 2023-01-01 PROCEDURE — 99223 1ST HOSP IP/OBS HIGH 75: CPT

## 2023-01-01 PROCEDURE — 93260 PRGRMG DEV EVAL IMPLTBL SYS: CPT

## 2023-01-01 PROCEDURE — 36415 COLL VENOUS BLD VENIPUNCTURE: CPT | Mod: CMCLAB

## 2023-01-01 PROCEDURE — 80368 SEDATIVE HYPNOTICS: CPT

## 2023-01-01 PROCEDURE — 97535 SELF CARE MNGMENT TRAINING: CPT | Mod: GO

## 2023-01-01 PROCEDURE — 93005 ELECTROCARDIOGRAM TRACING: CPT

## 2023-01-01 PROCEDURE — 84134 ASSAY OF PREALBUMIN: CPT | Mod: CMCLAB | Performed by: STUDENT IN AN ORGANIZED HEALTH CARE EDUCATION/TRAINING PROGRAM

## 2023-01-01 PROCEDURE — 83880 ASSAY OF NATRIURETIC PEPTIDE: CPT | Performed by: STUDENT IN AN ORGANIZED HEALTH CARE EDUCATION/TRAINING PROGRAM

## 2023-01-01 PROCEDURE — 71045 X-RAY EXAM CHEST 1 VIEW: CPT

## 2023-01-01 PROCEDURE — 82746 ASSAY OF FOLIC ACID SERUM: CPT | Performed by: STUDENT IN AN ORGANIZED HEALTH CARE EDUCATION/TRAINING PROGRAM

## 2023-01-01 PROCEDURE — 3079F DIAST BP 80-89 MM HG: CPT | Performed by: INTERNAL MEDICINE

## 2023-01-01 PROCEDURE — 80323 ALKALOIDS NOS: CPT | Mod: CMCLAB | Performed by: STUDENT IN AN ORGANIZED HEALTH CARE EDUCATION/TRAINING PROGRAM

## 2023-01-01 PROCEDURE — 99233 SBSQ HOSP IP/OBS HIGH 50: CPT | Performed by: STUDENT IN AN ORGANIZED HEALTH CARE EDUCATION/TRAINING PROGRAM

## 2023-01-01 PROCEDURE — 87522 HEPATITIS C REVRS TRNSCRPJ: CPT | Mod: CMCLAB | Performed by: STUDENT IN AN ORGANIZED HEALTH CARE EDUCATION/TRAINING PROGRAM

## 2023-01-01 PROCEDURE — 3078F DIAST BP <80 MM HG: CPT | Performed by: INTERNAL MEDICINE

## 2023-01-01 PROCEDURE — 87340 HEPATITIS B SURFACE AG IA: CPT | Mod: CMCLAB | Performed by: STUDENT IN AN ORGANIZED HEALTH CARE EDUCATION/TRAINING PROGRAM

## 2023-01-01 PROCEDURE — 99215 OFFICE O/P EST HI 40 MIN: CPT | Performed by: INTERNAL MEDICINE

## 2023-01-01 PROCEDURE — 2580000001 HC RX 258 IV SOLUTIONS: Performed by: STUDENT IN AN ORGANIZED HEALTH CARE EDUCATION/TRAINING PROGRAM

## 2023-01-01 PROCEDURE — 93010 ELECTROCARDIOGRAM REPORT: CPT | Performed by: INTERNAL MEDICINE

## 2023-01-01 PROCEDURE — 93922 UPR/L XTREMITY ART 2 LEVELS: CPT

## 2023-01-01 PROCEDURE — 36620 INSERTION CATHETER ARTERY: CPT | Mod: ZK | Performed by: STUDENT IN AN ORGANIZED HEALTH CARE EDUCATION/TRAINING PROGRAM

## 2023-01-01 PROCEDURE — 86704 HEP B CORE ANTIBODY TOTAL: CPT | Mod: CMCLAB | Performed by: STUDENT IN AN ORGANIZED HEALTH CARE EDUCATION/TRAINING PROGRAM

## 2023-01-01 PROCEDURE — 85305 CLOT INHIBIT PROT S TOTAL: CPT | Mod: CMCLAB | Performed by: STUDENT IN AN ORGANIZED HEALTH CARE EDUCATION/TRAINING PROGRAM

## 2023-01-01 PROCEDURE — 80361 OPIATES 1 OR MORE: CPT

## 2023-01-01 PROCEDURE — 87040 BLOOD CULTURE FOR BACTERIA: CPT | Mod: CMCLAB

## 2023-01-01 PROCEDURE — 83615 LACTATE (LD) (LDH) ENZYME: CPT | Mod: CMCLAB | Performed by: STUDENT IN AN ORGANIZED HEALTH CARE EDUCATION/TRAINING PROGRAM

## 2023-01-01 PROCEDURE — 70355 PANORAMIC X-RAY OF JAWS: CPT | Performed by: STUDENT IN AN ORGANIZED HEALTH CARE EDUCATION/TRAINING PROGRAM

## 2023-01-01 PROCEDURE — 84153 ASSAY OF PSA TOTAL: CPT | Mod: CMCLAB | Performed by: STUDENT IN AN ORGANIZED HEALTH CARE EDUCATION/TRAINING PROGRAM

## 2023-01-01 PROCEDURE — 93260 PRGRMG DEV EVAL IMPLTBL SYS: CPT | Performed by: INTERNAL MEDICINE

## 2023-01-01 PROCEDURE — 80354 DRUG SCREENING FENTANYL: CPT

## 2023-01-01 PROCEDURE — 4A1239Z MONITORING OF CARDIAC OUTPUT, PERCUTANEOUS APPROACH: ICD-10-PCS | Performed by: STUDENT IN AN ORGANIZED HEALTH CARE EDUCATION/TRAINING PROGRAM

## 2023-01-01 PROCEDURE — 81003 URINALYSIS AUTO W/O SCOPE: CPT | Mod: CMCLAB | Performed by: STUDENT IN AN ORGANIZED HEALTH CARE EDUCATION/TRAINING PROGRAM

## 2023-01-01 PROCEDURE — 97162 PT EVAL MOD COMPLEX 30 MIN: CPT | Mod: GP

## 2023-01-01 PROCEDURE — 97165 OT EVAL LOW COMPLEX 30 MIN: CPT | Mod: GO | Performed by: OCCUPATIONAL THERAPIST

## 2023-01-01 PROCEDURE — 99233 SBSQ HOSP IP/OBS HIGH 50: CPT

## 2023-01-01 PROCEDURE — 3074F SYST BP LT 130 MM HG: CPT | Performed by: INTERNAL MEDICINE

## 2023-01-01 PROCEDURE — 86147 CARDIOLIPIN ANTIBODY EA IG: CPT | Mod: CMCLAB | Performed by: STUDENT IN AN ORGANIZED HEALTH CARE EDUCATION/TRAINING PROGRAM

## 2023-01-01 PROCEDURE — 4500999001 HC ED NO CHARGE

## 2023-01-01 PROCEDURE — 84132 ASSAY OF SERUM POTASSIUM: CPT | Mod: CMCLAB | Performed by: STUDENT IN AN ORGANIZED HEALTH CARE EDUCATION/TRAINING PROGRAM

## 2023-01-01 RX ORDER — QUETIAPINE FUMARATE 200 MG/1
1 TABLET, FILM COATED ORAL NIGHTLY
COMMUNITY
Start: 2022-01-01

## 2023-01-01 RX ORDER — DICYCLOMINE HYDROCHLORIDE 20 MG/1
TABLET ORAL
COMMUNITY
Start: 2021-02-15 | End: 2023-01-01 | Stop reason: ALTCHOICE

## 2023-01-01 RX ORDER — FUROSEMIDE 10 MG/ML
40 INJECTION INTRAMUSCULAR; INTRAVENOUS ONCE
Status: COMPLETED | OUTPATIENT
Start: 2023-01-01 | End: 2023-01-01

## 2023-01-01 RX ORDER — ATORVASTATIN CALCIUM 40 MG/1
40 TABLET, FILM COATED ORAL NIGHTLY
Status: DISCONTINUED | OUTPATIENT
Start: 2023-01-01 | End: 2023-01-01

## 2023-01-01 RX ORDER — ONDANSETRON 4 MG/1
TABLET, FILM COATED ORAL
COMMUNITY
Start: 2021-04-11 | End: 2023-01-01 | Stop reason: ALTCHOICE

## 2023-01-01 RX ORDER — HYDROMORPHONE HYDROCHLORIDE 2 MG/1
2 TABLET ORAL ONCE
Status: COMPLETED | OUTPATIENT
Start: 2023-01-01 | End: 2023-01-01

## 2023-01-01 RX ORDER — DIPHENHYDRAMINE HCL 25 MG
25 CAPSULE ORAL EVERY 6 HOURS PRN
Status: DISCONTINUED | OUTPATIENT
Start: 2023-01-01 | End: 2023-01-01 | Stop reason: HOSPADM

## 2023-01-01 RX ORDER — ACETAMINOPHEN 325 MG/1
975 TABLET ORAL EVERY 6 HOURS PRN
Status: DISCONTINUED | OUTPATIENT
Start: 2023-01-01 | End: 2023-01-01 | Stop reason: HOSPADM

## 2023-01-01 RX ORDER — ACETAMINOPHEN 325 MG/1
975 TABLET ORAL EVERY 6 HOURS PRN
Qty: 30 TABLET | Refills: 0 | COMMUNITY
Start: 2023-01-01 | End: 2023-10-31 | Stop reason: CLARIF

## 2023-01-01 RX ORDER — MIDAZOLAM HYDROCHLORIDE 1 MG/ML
INJECTION INTRAMUSCULAR; INTRAVENOUS
Status: COMPLETED
Start: 2023-01-01 | End: 2023-01-01

## 2023-01-01 RX ORDER — FENTANYL CITRATE 50 UG/ML
INJECTION, SOLUTION INTRAMUSCULAR; INTRAVENOUS
Status: DISPENSED
Start: 2023-01-01 | End: 2023-01-01

## 2023-01-01 RX ORDER — HEPARIN SODIUM 1000 [USP'U]/ML
INJECTION, SOLUTION INTRAVENOUS; SUBCUTANEOUS
Status: DISPENSED
Start: 2023-01-01 | End: 2023-01-01

## 2023-01-01 RX ORDER — ACETAMINOPHEN 325 MG/1
650 TABLET ORAL EVERY 4 HOURS PRN
COMMUNITY
Start: 2023-01-01 | End: 2023-01-01 | Stop reason: ALTCHOICE

## 2023-01-01 RX ORDER — HYDRALAZINE HYDROCHLORIDE 100 MG/1
1 TABLET, FILM COATED ORAL 3 TIMES DAILY
COMMUNITY
Start: 2021-03-25 | End: 2023-01-01 | Stop reason: ALTCHOICE

## 2023-01-01 RX ORDER — ISOSORBIDE DINITRATE 20 MG/1
20 TABLET ORAL EVERY 8 HOURS
COMMUNITY
Start: 2021-03-25 | End: 2023-01-01 | Stop reason: ALTCHOICE

## 2023-01-01 RX ORDER — LANOLIN ALCOHOL/MO/W.PET/CERES
100 CREAM (GRAM) TOPICAL DAILY
COMMUNITY

## 2023-01-01 RX ORDER — ALPRAZOLAM 2 MG/1
2 TABLET ORAL 3 TIMES DAILY
COMMUNITY
Start: 2023-01-01

## 2023-01-01 RX ORDER — CARIPRAZINE 4.5 MG/1
4.5 CAPSULE, GELATIN COATED ORAL DAILY
COMMUNITY
End: 2023-01-01 | Stop reason: ALTCHOICE

## 2023-01-01 RX ORDER — DIPHENHYDRAMINE HCL 25 MG
25 TABLET ORAL NIGHTLY PRN
COMMUNITY

## 2023-01-01 RX ORDER — SODIUM NITROPRUSSIDE IN 0.9% SODIUM CHLORIDE 0.5 MG/ML
0-5 INJECTION INTRAVENOUS CONTINUOUS
Status: DISCONTINUED | OUTPATIENT
Start: 2023-01-01 | End: 2023-01-01

## 2023-01-01 RX ORDER — NALOXONE HYDROCHLORIDE 4 MG/.1ML
SPRAY NASAL
COMMUNITY
Start: 2023-01-01 | End: 2023-01-01 | Stop reason: ALTCHOICE

## 2023-01-01 RX ORDER — SACUBITRIL AND VALSARTAN 49; 51 MG/1; MG/1
1 TABLET, FILM COATED ORAL 2 TIMES DAILY
COMMUNITY
Start: 2021-03-25 | End: 2023-01-01 | Stop reason: ALTCHOICE

## 2023-01-01 RX ORDER — ALPRAZOLAM 0.5 MG/1
2 TABLET ORAL 3 TIMES DAILY PRN
COMMUNITY
Start: 2022-03-07 | End: 2023-01-01 | Stop reason: ALTCHOICE

## 2023-01-01 RX ORDER — POTASSIUM CHLORIDE 20 MEQ/1
20 TABLET, EXTENDED RELEASE ORAL ONCE
Status: COMPLETED | OUTPATIENT
Start: 2023-01-01 | End: 2023-01-01

## 2023-01-01 RX ORDER — MIDAZOLAM HYDROCHLORIDE 1 MG/ML
2 INJECTION INTRAMUSCULAR; INTRAVENOUS ONCE
Status: COMPLETED | OUTPATIENT
Start: 2023-01-01 | End: 2023-01-01

## 2023-01-01 RX ORDER — GLIMEPIRIDE 2 MG/1
TABLET ORAL
COMMUNITY
Start: 2021-04-11 | End: 2023-01-01 | Stop reason: ALTCHOICE

## 2023-01-01 RX ORDER — HYDRALAZINE HYDROCHLORIDE 25 MG/1
25 TABLET, FILM COATED ORAL EVERY 8 HOURS
Status: DISCONTINUED | OUTPATIENT
Start: 2023-01-01 | End: 2023-01-01

## 2023-01-01 RX ORDER — ROPINIROLE 1 MG/1
2 TABLET, FILM COATED ORAL DAILY
Status: DISCONTINUED | OUTPATIENT
Start: 2023-01-01 | End: 2023-01-01

## 2023-01-01 RX ORDER — HYDROXYZINE PAMOATE 100 MG/1
CAPSULE ORAL
COMMUNITY
End: 2023-01-01 | Stop reason: ALTCHOICE

## 2023-01-01 RX ORDER — IBUPROFEN 200 MG
TABLET ORAL
COMMUNITY
Start: 2023-01-01 | End: 2023-01-01 | Stop reason: ALTCHOICE

## 2023-01-01 RX ORDER — NOREPINEPHRINE BITARTRATE/D5W 8 MG/250ML
.01-.5 PLASTIC BAG, INJECTION (ML) INTRAVENOUS CONTINUOUS
Status: DISCONTINUED | OUTPATIENT
Start: 2023-01-01 | End: 2023-01-01

## 2023-01-01 RX ORDER — MAGNESIUM SULFATE HEPTAHYDRATE 40 MG/ML
2 INJECTION, SOLUTION INTRAVENOUS ONCE
Status: COMPLETED | OUTPATIENT
Start: 2023-01-01 | End: 2023-01-01

## 2023-01-01 RX ORDER — TORSEMIDE 100 MG/1
100 TABLET ORAL DAILY
Status: ON HOLD | COMMUNITY
Start: 2022-01-01 | End: 2023-01-01 | Stop reason: SDUPTHER

## 2023-01-01 RX ORDER — GABAPENTIN 100 MG/1
200 CAPSULE ORAL EVERY 8 HOURS SCHEDULED
Status: DISCONTINUED | OUTPATIENT
Start: 2023-01-01 | End: 2023-01-01

## 2023-01-01 RX ORDER — ATORVASTATIN CALCIUM 40 MG/1
1 TABLET, FILM COATED ORAL NIGHTLY
Status: ON HOLD | COMMUNITY
Start: 2022-01-01 | End: 2023-01-01 | Stop reason: WASHOUT

## 2023-01-01 RX ORDER — TRAZODONE HYDROCHLORIDE 50 MG/1
150 TABLET ORAL NIGHTLY PRN
Status: DISCONTINUED | OUTPATIENT
Start: 2023-01-01 | End: 2023-01-01 | Stop reason: HOSPADM

## 2023-01-01 RX ORDER — EZETIMIBE 10 MG/1
10 TABLET ORAL DAILY
Status: DISCONTINUED | OUTPATIENT
Start: 2023-01-01 | End: 2023-01-01

## 2023-01-01 RX ORDER — HEPARIN SODIUM 5000 [USP'U]/ML
10000 INJECTION, SOLUTION INTRAVENOUS; SUBCUTANEOUS ONCE
Status: DISCONTINUED | OUTPATIENT
Start: 2023-01-01 | End: 2023-01-01

## 2023-01-01 RX ORDER — ROPINIROLE 2 MG/1
2 TABLET, FILM COATED ORAL
Status: DISCONTINUED | OUTPATIENT
Start: 2023-01-01 | End: 2023-01-01 | Stop reason: HOSPADM

## 2023-01-01 RX ORDER — ALPRAZOLAM 0.5 MG/1
2 TABLET ORAL EVERY 8 HOURS PRN
Status: DISCONTINUED | OUTPATIENT
Start: 2023-01-01 | End: 2023-01-01 | Stop reason: HOSPADM

## 2023-01-01 RX ORDER — OXYCODONE HYDROCHLORIDE 10 MG/1
10 TABLET ORAL EVERY 4 HOURS PRN
Qty: 168 TABLET | Refills: 0 | Status: SHIPPED | OUTPATIENT
Start: 2023-01-01 | End: 2023-10-31 | Stop reason: CLARIF

## 2023-01-01 RX ORDER — DEXTROSE MONOHYDRATE 50 MG/ML
INJECTION, SOLUTION INTRAVENOUS
COMMUNITY
Start: 2023-01-01 | End: 2023-01-01 | Stop reason: ALTCHOICE

## 2023-01-01 RX ORDER — SERTRALINE HYDROCHLORIDE 50 MG/1
1 TABLET, FILM COATED ORAL DAILY
COMMUNITY
Start: 2022-01-01 | End: 2023-01-01 | Stop reason: ALTCHOICE

## 2023-01-01 RX ORDER — FENTANYL CITRATE 50 UG/ML
INJECTION, SOLUTION INTRAMUSCULAR; INTRAVENOUS
Status: COMPLETED
Start: 2023-01-01 | End: 2023-01-01

## 2023-01-01 RX ORDER — QUETIAPINE FUMARATE 100 MG/1
200 TABLET, FILM COATED ORAL NIGHTLY
Status: DISCONTINUED | OUTPATIENT
Start: 2023-01-01 | End: 2023-01-01 | Stop reason: HOSPADM

## 2023-01-01 RX ORDER — DOCUSATE SODIUM 100 MG/1
100 CAPSULE, LIQUID FILLED ORAL 2 TIMES DAILY
Status: DISCONTINUED | OUTPATIENT
Start: 2023-01-01 | End: 2023-01-01 | Stop reason: HOSPADM

## 2023-01-01 RX ORDER — TRAZODONE HYDROCHLORIDE 100 MG/1
1 TABLET ORAL NIGHTLY
COMMUNITY
Start: 2022-04-01 | End: 2023-01-01 | Stop reason: ALTCHOICE

## 2023-01-01 RX ORDER — LISINOPRIL 5 MG/1
TABLET ORAL
Status: ON HOLD | COMMUNITY
Start: 2022-07-10 | End: 2023-01-01 | Stop reason: WASHOUT

## 2023-01-01 RX ORDER — ACETAMINOPHEN 325 MG/1
650 TABLET ORAL EVERY 4 HOURS PRN
COMMUNITY

## 2023-01-01 RX ORDER — VIT C/E/ZN/COPPR/LUTEIN/ZEAXAN 250MG-90MG
25 CAPSULE ORAL EVERY MORNING
COMMUNITY

## 2023-01-01 RX ORDER — MICONAZOLE NITRATE 2 %
2 CREAM (GRAM) TOPICAL
COMMUNITY
Start: 2023-01-01 | End: 2023-01-01 | Stop reason: ALTCHOICE

## 2023-01-01 RX ORDER — HYDRALAZINE HYDROCHLORIDE 50 MG/1
50 TABLET, FILM COATED ORAL EVERY 8 HOURS
Status: DISCONTINUED | OUTPATIENT
Start: 2023-01-01 | End: 2023-01-01

## 2023-01-01 RX ORDER — AMOXICILLIN 250 MG
2 CAPSULE ORAL 2 TIMES DAILY
COMMUNITY
Start: 2023-01-01 | End: 2023-01-01 | Stop reason: ALTCHOICE

## 2023-01-01 RX ORDER — IPRATROPIUM BROMIDE AND ALBUTEROL SULFATE 2.5; .5 MG/3ML; MG/3ML
3 SOLUTION RESPIRATORY (INHALATION)
Status: DISCONTINUED | OUTPATIENT
Start: 2023-01-01 | End: 2023-01-01

## 2023-01-01 RX ORDER — ROPINIROLE 2 MG/1
2 TABLET, FILM COATED ORAL DAILY
Status: DISCONTINUED | OUTPATIENT
Start: 2023-01-01 | End: 2023-01-01

## 2023-01-01 RX ORDER — INSULIN LISPRO 100 [IU]/ML
0-5 INJECTION, SOLUTION INTRAVENOUS; SUBCUTANEOUS
Status: DISCONTINUED | OUTPATIENT
Start: 2023-01-01 | End: 2023-01-01 | Stop reason: HOSPADM

## 2023-01-01 RX ORDER — SPIRONOLACTONE 25 MG/1
25 TABLET ORAL DAILY
Status: DISCONTINUED | OUTPATIENT
Start: 2023-01-01 | End: 2023-01-01

## 2023-01-01 RX ORDER — POLYETHYLENE GLYCOL 3350 17 G/17G
17 POWDER, FOR SOLUTION ORAL DAILY
COMMUNITY

## 2023-01-01 RX ORDER — EPINEPHRINE 0.3 MG/.3ML
INJECTION SUBCUTANEOUS
COMMUNITY
Start: 2021-08-03 | End: 2023-01-01 | Stop reason: ALTCHOICE

## 2023-01-01 RX ORDER — FERROUS SULFATE 325(65) MG
1 TABLET ORAL DAILY
COMMUNITY
Start: 2021-02-15 | End: 2023-01-01 | Stop reason: ALTCHOICE

## 2023-01-01 RX ORDER — DULOXETIN HYDROCHLORIDE 30 MG/1
30 CAPSULE, DELAYED RELEASE ORAL 2 TIMES DAILY
COMMUNITY

## 2023-01-01 RX ORDER — HEPARIN SODIUM 10000 [USP'U]/100ML
INJECTION, SOLUTION INTRAVENOUS
Status: COMPLETED
Start: 2023-01-01 | End: 2023-01-01

## 2023-01-01 RX ORDER — NALOXONE HYDROCHLORIDE 4 MG/.1ML
4 SPRAY NASAL AS NEEDED
Qty: 2 EACH | Refills: 0 | Status: SHIPPED | OUTPATIENT
Start: 2023-01-01 | End: 2023-10-31 | Stop reason: CLARIF

## 2023-01-01 RX ORDER — MILRINONE LACTATE 0.2 MG/ML
INJECTION, SOLUTION INTRAVENOUS
COMMUNITY
Start: 2023-01-01 | End: 2023-01-01 | Stop reason: HOSPADM

## 2023-01-01 RX ORDER — HYDROXYZINE PAMOATE 50 MG/1
CAPSULE ORAL
COMMUNITY
Start: 2023-01-01 | End: 2023-01-01 | Stop reason: ALTCHOICE

## 2023-01-01 RX ORDER — MULTIVIT-MIN/IRON FUM/FOLIC AC 7.5 MG-4
1 TABLET ORAL DAILY
COMMUNITY
Start: 2023-01-01 | End: 2023-01-01 | Stop reason: ALTCHOICE

## 2023-01-01 RX ORDER — POTASSIUM CHLORIDE 20 MEQ/1
60 TABLET, EXTENDED RELEASE ORAL ONCE
Status: COMPLETED | OUTPATIENT
Start: 2023-01-01 | End: 2023-01-01

## 2023-01-01 RX ORDER — TRAZODONE HYDROCHLORIDE 150 MG/1
1 TABLET ORAL NIGHTLY
COMMUNITY
Start: 2022-04-01

## 2023-01-01 RX ORDER — DEXTROSE MONOHYDRATE 100 MG/ML
0.3 INJECTION, SOLUTION INTRAVENOUS ONCE AS NEEDED
Status: DISCONTINUED | OUTPATIENT
Start: 2023-01-01 | End: 2023-01-01 | Stop reason: HOSPADM

## 2023-01-01 RX ORDER — LANOLIN ALCOHOL/MO/W.PET/CERES
1 CREAM (GRAM) TOPICAL DAILY
COMMUNITY
Start: 2023-01-01

## 2023-01-01 RX ORDER — MILRINONE LACTATE 0.2 MG/ML
0.38 INJECTION, SOLUTION INTRAVENOUS CONTINUOUS
Status: DISCONTINUED | OUTPATIENT
Start: 2023-01-01 | End: 2023-01-01 | Stop reason: HOSPADM

## 2023-01-01 RX ORDER — ACETAMINOPHEN 325 MG/1
650 TABLET ORAL EVERY 6 HOURS PRN
Status: DISCONTINUED | OUTPATIENT
Start: 2023-01-01 | End: 2023-01-01

## 2023-01-01 RX ORDER — ESCITALOPRAM OXALATE 20 MG/1
TABLET ORAL
COMMUNITY
Start: 2021-02-15 | End: 2023-01-01 | Stop reason: ALTCHOICE

## 2023-01-01 RX ORDER — ROPINIROLE 4 MG/1
4 TABLET, FILM COATED ORAL DAILY
COMMUNITY
Start: 2023-01-01 | End: 2023-01-01 | Stop reason: ALTCHOICE

## 2023-01-01 RX ORDER — OXYCODONE HYDROCHLORIDE 5 MG/1
5 TABLET ORAL EVERY 6 HOURS PRN
Status: DISCONTINUED | OUTPATIENT
Start: 2023-01-01 | End: 2023-01-01

## 2023-01-01 RX ORDER — ALPRAZOLAM 1 MG/1
TABLET ORAL
COMMUNITY
End: 2023-01-01 | Stop reason: ALTCHOICE

## 2023-01-01 RX ORDER — MILRINONE LACTATE 0.2 MG/ML
0.25 INJECTION, SOLUTION INTRAVENOUS CONTINUOUS
Status: DISCONTINUED | OUTPATIENT
Start: 2023-01-01 | End: 2023-01-01

## 2023-01-01 RX ORDER — DEXTROSE 50 % IN WATER (D50W) INTRAVENOUS SYRINGE
25
Status: DISCONTINUED | OUTPATIENT
Start: 2023-01-01 | End: 2023-01-01 | Stop reason: HOSPADM

## 2023-01-01 RX ORDER — NOREPINEPHRINE BITARTRATE/D5W 8 MG/250ML
PLASTIC BAG, INJECTION (ML) INTRAVENOUS
Status: COMPLETED
Start: 2023-01-01 | End: 2023-01-01

## 2023-01-01 RX ORDER — EZETIMIBE 10 MG/1
1 TABLET ORAL DAILY
COMMUNITY
Start: 2023-01-01

## 2023-01-01 RX ORDER — ACETAMINOPHEN 325 MG/1
650 TABLET ORAL EVERY 6 HOURS PRN
Status: CANCELLED | OUTPATIENT
Start: 2023-01-01

## 2023-01-01 RX ORDER — ALPRAZOLAM 0.5 MG/1
2 TABLET ORAL 3 TIMES DAILY PRN
Status: DISCONTINUED | OUTPATIENT
Start: 2023-01-01 | End: 2023-01-01

## 2023-01-01 RX ORDER — SODIUM CHLORIDE, SODIUM LACTATE, POTASSIUM CHLORIDE, CALCIUM CHLORIDE 600; 310; 30; 20 MG/100ML; MG/100ML; MG/100ML; MG/100ML
10 INJECTION, SOLUTION INTRAVENOUS CONTINUOUS
Status: DISCONTINUED | OUTPATIENT
Start: 2023-01-01 | End: 2023-01-01

## 2023-01-01 RX ORDER — HEPARIN SODIUM 5000 [USP'U]/ML
5000-10000 INJECTION, SOLUTION INTRAVENOUS; SUBCUTANEOUS EVERY 4 HOURS PRN
Status: DISCONTINUED | OUTPATIENT
Start: 2023-01-01 | End: 2023-01-01

## 2023-01-01 RX ORDER — ACETAMINOPHEN 650 MG/1
650 SUPPOSITORY RECTAL EVERY 6 HOURS PRN
Status: DISCONTINUED | OUTPATIENT
Start: 2023-01-01 | End: 2023-01-01 | Stop reason: HOSPADM

## 2023-01-01 RX ORDER — MILRINONE LACTATE 0.2 MG/ML
0.38 INJECTION, SOLUTION INTRAVENOUS CONTINUOUS
Qty: 999 ML | Refills: 11 | Status: SHIPPED
Start: 2023-01-01 | End: 2023-10-31 | Stop reason: CLARIF

## 2023-01-01 RX ORDER — DULOXETIN HYDROCHLORIDE 30 MG/1
30 CAPSULE, DELAYED RELEASE ORAL 2 TIMES DAILY
Status: DISCONTINUED | OUTPATIENT
Start: 2023-01-01 | End: 2023-01-01 | Stop reason: HOSPADM

## 2023-01-01 RX ORDER — OXYCODONE HYDROCHLORIDE 5 MG/1
5 TABLET ORAL EVERY 4 HOURS PRN
Qty: 15 TABLET | Refills: 0 | Status: SHIPPED | OUTPATIENT
Start: 2023-01-01 | End: 2023-01-01 | Stop reason: SDUPTHER

## 2023-01-01 RX ORDER — HEPARIN SODIUM 10000 [USP'U]/100ML
0-4500 INJECTION, SOLUTION INTRAVENOUS CONTINUOUS
Status: DISCONTINUED | OUTPATIENT
Start: 2023-01-01 | End: 2023-01-01

## 2023-01-01 RX ORDER — POTASSIUM CHLORIDE 29.8 MG/ML
40 INJECTION INTRAVENOUS ONCE
Status: COMPLETED | OUTPATIENT
Start: 2023-01-01 | End: 2023-01-01

## 2023-01-01 RX ORDER — ROPINIROLE 0.25 MG/1
0.25 TABLET, FILM COATED ORAL NIGHTLY
Qty: 60 TABLET | Refills: 1 | Status: SHIPPED | OUTPATIENT
Start: 2023-01-01 | End: 2023-01-01

## 2023-01-01 RX ORDER — HYDROXYZINE HYDROCHLORIDE 50 MG/1
2 TABLET, FILM COATED ORAL NIGHTLY
COMMUNITY
Start: 2023-01-01

## 2023-01-01 RX ORDER — ACETAMINOPHEN 160 MG/5ML
650 SOLUTION ORAL EVERY 6 HOURS PRN
Status: DISCONTINUED | OUTPATIENT
Start: 2023-01-01 | End: 2023-01-01 | Stop reason: HOSPADM

## 2023-01-01 RX ORDER — TORSEMIDE 100 MG/1
100 TABLET ORAL DAILY
Qty: 30 TABLET | Refills: 3 | Status: SHIPPED | OUTPATIENT
Start: 2023-01-01 | End: 2023-11-20

## 2023-01-01 RX ORDER — NOREPINEPHRINE BITARTRATE/D5W 8 MG/250ML
.01-1 PLASTIC BAG, INJECTION (ML) INTRAVENOUS CONTINUOUS
Status: DISCONTINUED | OUTPATIENT
Start: 2023-01-01 | End: 2023-01-01

## 2023-01-01 RX ORDER — POTASSIUM CHLORIDE 20 MEQ/1
40 TABLET, EXTENDED RELEASE ORAL ONCE
Status: COMPLETED | OUTPATIENT
Start: 2023-01-01 | End: 2023-01-01

## 2023-01-01 RX ORDER — METOPROLOL SUCCINATE 25 MG/1
1 TABLET, EXTENDED RELEASE ORAL DAILY
Status: ON HOLD | COMMUNITY
Start: 2022-01-01 | End: 2023-01-01 | Stop reason: WASHOUT

## 2023-01-01 RX ORDER — SPIRONOLACTONE 25 MG/1
25 TABLET ORAL DAILY
Status: DISCONTINUED | OUTPATIENT
Start: 2023-01-01 | End: 2023-01-01 | Stop reason: HOSPADM

## 2023-01-01 RX ORDER — HYDROXYZINE HYDROCHLORIDE 25 MG/1
25 TABLET, FILM COATED ORAL EVERY 6 HOURS PRN
Status: DISCONTINUED | OUTPATIENT
Start: 2023-01-01 | End: 2023-01-01 | Stop reason: HOSPADM

## 2023-01-01 RX ORDER — TORSEMIDE 20 MG/1
100 TABLET ORAL DAILY
Status: DISCONTINUED | OUTPATIENT
Start: 2023-01-01 | End: 2023-01-01 | Stop reason: HOSPADM

## 2023-01-01 RX ORDER — OXYCODONE HYDROCHLORIDE 5 MG/1
5 TABLET ORAL EVERY 4 HOURS PRN
Status: DISCONTINUED | OUTPATIENT
Start: 2023-01-01 | End: 2023-01-01 | Stop reason: HOSPADM

## 2023-01-01 RX ORDER — ROPINIROLE 2 MG/1
2 TABLET, FILM COATED ORAL NIGHTLY
COMMUNITY
Start: 2023-01-01

## 2023-01-01 RX ORDER — FENTANYL CITRATE 50 UG/ML
125 INJECTION, SOLUTION INTRAMUSCULAR; INTRAVENOUS ONCE
Status: COMPLETED | OUTPATIENT
Start: 2023-01-01 | End: 2023-01-01

## 2023-01-01 RX ORDER — SUCRALFATE 1 G/10ML
SUSPENSION ORAL
COMMUNITY
Start: 2021-04-11 | End: 2023-01-01 | Stop reason: ALTCHOICE

## 2023-01-01 RX ORDER — LANOLIN ALCOHOL/MO/W.PET/CERES
1 CREAM (GRAM) TOPICAL DAILY
COMMUNITY
Start: 2021-03-25 | End: 2023-01-01 | Stop reason: ALTCHOICE

## 2023-01-01 RX ORDER — ISOSORBIDE DINITRATE 10 MG/1
10 TABLET ORAL
Status: DISCONTINUED | OUTPATIENT
Start: 2023-01-01 | End: 2023-01-01

## 2023-01-01 RX ORDER — POLYETHYLENE GLYCOL 3350 17 G/17G
17 POWDER, FOR SOLUTION ORAL DAILY
Status: DISCONTINUED | OUTPATIENT
Start: 2023-01-01 | End: 2023-01-01 | Stop reason: HOSPADM

## 2023-01-01 RX ORDER — OXYCODONE HYDROCHLORIDE 5 MG/1
10 TABLET ORAL EVERY 6 HOURS PRN
Status: DISCONTINUED | OUTPATIENT
Start: 2023-01-01 | End: 2023-01-01

## 2023-01-01 RX ORDER — ACETAMINOPHEN 650 MG/1
650 SUPPOSITORY RECTAL EVERY 6 HOURS PRN
Status: DISCONTINUED | OUTPATIENT
Start: 2023-01-01 | End: 2023-01-01

## 2023-01-01 RX ORDER — PANTOPRAZOLE SODIUM 40 MG/1
1 TABLET, DELAYED RELEASE ORAL DAILY
COMMUNITY
Start: 2021-03-25 | End: 2023-01-01 | Stop reason: ALTCHOICE

## 2023-01-01 RX ORDER — IPRATROPIUM BROMIDE AND ALBUTEROL SULFATE 2.5; .5 MG/3ML; MG/3ML
SOLUTION RESPIRATORY (INHALATION)
Status: ON HOLD | COMMUNITY
Start: 2022-01-01 | End: 2023-01-01 | Stop reason: WASHOUT

## 2023-01-01 RX ORDER — HYDRALAZINE HYDROCHLORIDE 50 MG/1
TABLET, FILM COATED ORAL
COMMUNITY
Start: 2021-04-11 | End: 2023-01-01 | Stop reason: ALTCHOICE

## 2023-01-01 RX ORDER — FUROSEMIDE 10 MG/ML
80 INJECTION INTRAMUSCULAR; INTRAVENOUS ONCE
Status: COMPLETED | OUTPATIENT
Start: 2023-01-01 | End: 2023-01-01

## 2023-01-01 RX ORDER — ACETAMINOPHEN 160 MG/5ML
650 SOLUTION ORAL EVERY 6 HOURS PRN
Status: DISCONTINUED | OUTPATIENT
Start: 2023-01-01 | End: 2023-01-01

## 2023-01-01 RX ORDER — HYDRALAZINE HYDROCHLORIDE 25 MG/1
25 TABLET, FILM COATED ORAL ONCE
Status: DISCONTINUED | OUTPATIENT
Start: 2023-01-01 | End: 2023-01-01

## 2023-01-01 RX ORDER — CHOLECALCIFEROL (VITAMIN D3) 25 MCG
TABLET ORAL
COMMUNITY
Start: 2023-01-01 | End: 2023-01-01 | Stop reason: ALTCHOICE

## 2023-01-01 RX ORDER — ONDANSETRON HYDROCHLORIDE 2 MG/ML
4 INJECTION, SOLUTION INTRAVENOUS EVERY 6 HOURS PRN
Status: DISCONTINUED | OUTPATIENT
Start: 2023-01-01 | End: 2023-01-01 | Stop reason: HOSPADM

## 2023-01-01 RX ORDER — SPIRONOLACTONE 25 MG/1
1 TABLET ORAL DAILY
COMMUNITY
Start: 2022-01-19

## 2023-01-01 RX ORDER — POTASSIUM CHLORIDE 1500 MG/1
2 TABLET, EXTENDED RELEASE ORAL DAILY
Status: ON HOLD | COMMUNITY
Start: 2022-01-01 | End: 2023-01-01 | Stop reason: WASHOUT

## 2023-01-01 RX ORDER — ROPINIROLE 0.25 MG/1
0.25 TABLET, FILM COATED ORAL NIGHTLY
Qty: 90 TABLET | Refills: 0 | Status: SHIPPED | OUTPATIENT
Start: 2023-01-01 | End: 2023-01-01 | Stop reason: ALTCHOICE

## 2023-01-01 RX ORDER — POTASSIUM CHLORIDE 20 MEQ/1
20 TABLET, EXTENDED RELEASE ORAL ONCE
Status: DISCONTINUED | OUTPATIENT
Start: 2023-01-01 | End: 2023-01-01

## 2023-01-01 RX ORDER — AMOXICILLIN 250 MG
2 CAPSULE ORAL 2 TIMES DAILY
COMMUNITY

## 2023-01-01 RX ORDER — DOBUTAMINE 250 MG/20ML
INJECTION INTRAVENOUS
COMMUNITY
Start: 2023-01-01 | End: 2023-01-01 | Stop reason: ALTCHOICE

## 2023-01-01 RX ADMIN — INSULIN LISPRO 2 UNITS: 100 INJECTION, SOLUTION INTRAVENOUS; SUBCUTANEOUS at 08:06

## 2023-01-01 RX ADMIN — OXYCODONE HYDROCHLORIDE 5 MG: 5 TABLET ORAL at 13:00

## 2023-01-01 RX ADMIN — RIVAROXABAN 20 MG: 20 TABLET, FILM COATED ORAL at 16:03

## 2023-01-01 RX ADMIN — DULOXETINE HYDROCHLORIDE 30 MG: 30 CAPSULE, DELAYED RELEASE ORAL at 08:24

## 2023-01-01 RX ADMIN — MILRINONE LACTATE 0.25 MCG/KG/MIN: 200 INJECTION, SOLUTION INTRAVENOUS at 02:23

## 2023-01-01 RX ADMIN — INSULIN LISPRO 2 UNITS: 100 INJECTION, SOLUTION INTRAVENOUS; SUBCUTANEOUS at 08:15

## 2023-01-01 RX ADMIN — OXYCODONE HYDROCHLORIDE 5 MG: 5 TABLET ORAL at 11:39

## 2023-01-01 RX ADMIN — POTASSIUM CHLORIDE 40 MEQ: 20 TABLET, EXTENDED RELEASE ORAL at 09:01

## 2023-01-01 RX ADMIN — NOREPINEPHRINE BITARTRATE 0.09 MCG/KG/MIN: 8 INJECTION, SOLUTION INTRAVENOUS at 00:39

## 2023-01-01 RX ADMIN — HYDRALAZINE HYDROCHLORIDE 25 MG: 25 TABLET, FILM COATED ORAL at 20:52

## 2023-01-01 RX ADMIN — MILRINONE LACTATE 0.38 MCG/KG/MIN: 200 INJECTION, SOLUTION INTRAVENOUS at 20:07

## 2023-01-01 RX ADMIN — MILRINONE LACTATE 0.38 MCG/KG/MIN: 200 INJECTION, SOLUTION INTRAVENOUS at 04:48

## 2023-01-01 RX ADMIN — INSULIN LISPRO 1 UNITS: 100 INJECTION, SOLUTION INTRAVENOUS; SUBCUTANEOUS at 16:50

## 2023-01-01 RX ADMIN — MILRINONE LACTATE 0.38 MCG/KG/MIN: 200 INJECTION, SOLUTION INTRAVENOUS at 11:00

## 2023-01-01 RX ADMIN — OXYCODONE HYDROCHLORIDE 5 MG: 5 TABLET ORAL at 11:10

## 2023-01-01 RX ADMIN — INSULIN LISPRO 2 UNITS: 100 INJECTION, SOLUTION INTRAVENOUS; SUBCUTANEOUS at 08:43

## 2023-01-01 RX ADMIN — ROPINIROLE 2 MG: 2 TABLET, FILM COATED ORAL at 20:33

## 2023-01-01 RX ADMIN — IPRATROPIUM BROMIDE AND ALBUTEROL SULFATE 3 ML: .5; 3 SOLUTION RESPIRATORY (INHALATION) at 08:29

## 2023-01-01 RX ADMIN — HEPARIN SODIUM 1900 UNITS/HR: 10000 INJECTION, SOLUTION INTRAVENOUS at 01:44

## 2023-01-01 RX ADMIN — DULOXETINE HYDROCHLORIDE 30 MG: 30 CAPSULE, DELAYED RELEASE ORAL at 08:38

## 2023-01-01 RX ADMIN — NOREPINEPHRINE BITARTRATE 0.1 MCG/KG/MIN: 8 INJECTION, SOLUTION INTRAVENOUS at 05:29

## 2023-01-01 RX ADMIN — ALPRAZOLAM 2 MG: 0.5 TABLET ORAL at 16:51

## 2023-01-01 RX ADMIN — ALPRAZOLAM 2 MG: 0.5 TABLET ORAL at 16:22

## 2023-01-01 RX ADMIN — POLYETHYLENE GLYCOL 3350 17 G: 17 POWDER, FOR SOLUTION ORAL at 08:14

## 2023-01-01 RX ADMIN — OXYCODONE HYDROCHLORIDE 5 MG: 5 TABLET ORAL at 03:57

## 2023-01-01 RX ADMIN — TRAZODONE HYDROCHLORIDE 150 MG: 50 TABLET ORAL at 01:05

## 2023-01-01 RX ADMIN — OXYCODONE HYDROCHLORIDE 5 MG: 5 TABLET ORAL at 20:59

## 2023-01-01 RX ADMIN — HYDROXYZINE HYDROCHLORIDE 25 MG: 25 TABLET, FILM COATED ORAL at 20:07

## 2023-01-01 RX ADMIN — OXYCODONE HYDROCHLORIDE 5 MG: 5 TABLET ORAL at 22:20

## 2023-01-01 RX ADMIN — MILRINONE LACTATE 0.38 MCG/KG/MIN: 200 INJECTION, SOLUTION INTRAVENOUS at 00:19

## 2023-01-01 RX ADMIN — ONDANSETRON 4 MG: 2 INJECTION INTRAMUSCULAR; INTRAVENOUS at 21:01

## 2023-01-01 RX ADMIN — DULOXETINE HYDROCHLORIDE 30 MG: 30 CAPSULE, DELAYED RELEASE ORAL at 08:46

## 2023-01-01 RX ADMIN — MILRINONE LACTATE 0.38 MCG/KG/MIN: 200 INJECTION, SOLUTION INTRAVENOUS at 12:43

## 2023-01-01 RX ADMIN — DULOXETINE HYDROCHLORIDE 30 MG: 30 CAPSULE, DELAYED RELEASE ORAL at 21:10

## 2023-01-01 RX ADMIN — FUROSEMIDE 40 MG: 10 INJECTION, SOLUTION INTRAVENOUS at 09:47

## 2023-01-01 RX ADMIN — HYDRALAZINE HYDROCHLORIDE 50 MG: 50 TABLET, FILM COATED ORAL at 13:00

## 2023-01-01 RX ADMIN — ATORVASTATIN CALCIUM 40 MG: 40 TABLET, FILM COATED ORAL at 20:52

## 2023-01-01 RX ADMIN — ALPRAZOLAM 2 MG: 0.5 TABLET ORAL at 09:02

## 2023-01-01 RX ADMIN — ISOSORBIDE DINITRATE 10 MG: 10 TABLET ORAL at 08:20

## 2023-01-01 RX ADMIN — FUROSEMIDE 80 MG: 10 INJECTION, SOLUTION INTRAVENOUS at 12:27

## 2023-01-01 RX ADMIN — MILRINONE LACTATE 0.38 MCG/KG/MIN: 200 INJECTION, SOLUTION INTRAVENOUS at 00:03

## 2023-01-01 RX ADMIN — EMPAGLIFLOZIN 10 MG: 10 TABLET, FILM COATED ORAL at 09:00

## 2023-01-01 RX ADMIN — OXYCODONE HYDROCHLORIDE 5 MG: 5 TABLET ORAL at 16:57

## 2023-01-01 RX ADMIN — POTASSIUM CHLORIDE 60 MEQ: 1500 TABLET, EXTENDED RELEASE ORAL at 05:41

## 2023-01-01 RX ADMIN — DULOXETINE HYDROCHLORIDE 30 MG: 30 CAPSULE, DELAYED RELEASE ORAL at 08:41

## 2023-01-01 RX ADMIN — FUROSEMIDE 20 MG/HR: 10 INJECTION, SOLUTION INTRAMUSCULAR; INTRAVENOUS at 23:26

## 2023-01-01 RX ADMIN — OXYCODONE HYDROCHLORIDE 5 MG: 5 TABLET ORAL at 17:40

## 2023-01-01 RX ADMIN — QUETIAPINE FUMARATE 200 MG: 100 TABLET ORAL at 21:24

## 2023-01-01 RX ADMIN — ONDANSETRON 4 MG: 2 INJECTION INTRAMUSCULAR; INTRAVENOUS at 10:56

## 2023-01-01 RX ADMIN — EZETIMIBE 10 MG: 10 TABLET ORAL at 08:15

## 2023-01-01 RX ADMIN — ATORVASTATIN CALCIUM 40 MG: 40 TABLET, FILM COATED ORAL at 20:08

## 2023-01-01 RX ADMIN — ALPRAZOLAM 2 MG: 0.5 TABLET ORAL at 11:18

## 2023-01-01 RX ADMIN — OXYCODONE HYDROCHLORIDE 5 MG: 5 TABLET ORAL at 15:30

## 2023-01-01 RX ADMIN — INSULIN LISPRO 1 UNITS: 100 INJECTION, SOLUTION INTRAVENOUS; SUBCUTANEOUS at 17:23

## 2023-01-01 RX ADMIN — ALPRAZOLAM 2 MG: 0.5 TABLET ORAL at 20:53

## 2023-01-01 RX ADMIN — MILRINONE LACTATE 0.38 MCG/KG/MIN: 200 INJECTION, SOLUTION INTRAVENOUS at 07:11

## 2023-01-01 RX ADMIN — FENTANYL CITRATE 125 MCG: 50 INJECTION INTRAMUSCULAR; INTRAVENOUS at 16:00

## 2023-01-01 RX ADMIN — DULOXETINE HYDROCHLORIDE 30 MG: 30 CAPSULE, DELAYED RELEASE ORAL at 08:33

## 2023-01-01 RX ADMIN — ALPRAZOLAM 2 MG: 0.5 TABLET ORAL at 08:20

## 2023-01-01 RX ADMIN — OXYCODONE HYDROCHLORIDE 5 MG: 5 TABLET ORAL at 04:35

## 2023-01-01 RX ADMIN — OXYCODONE HYDROCHLORIDE 5 MG: 5 TABLET ORAL at 20:40

## 2023-01-01 RX ADMIN — ISOSORBIDE DINITRATE 10 MG: 10 TABLET ORAL at 14:36

## 2023-01-01 RX ADMIN — ACETAMINOPHEN 975 MG: 325 TABLET ORAL at 08:44

## 2023-01-01 RX ADMIN — EZETIMIBE 10 MG: 10 TABLET ORAL at 08:44

## 2023-01-01 RX ADMIN — DULOXETINE HYDROCHLORIDE 30 MG: 30 CAPSULE, DELAYED RELEASE ORAL at 21:24

## 2023-01-01 RX ADMIN — ONDANSETRON 4 MG: 2 INJECTION INTRAMUSCULAR; INTRAVENOUS at 13:31

## 2023-01-01 RX ADMIN — OXYCODONE HYDROCHLORIDE 5 MG: 5 TABLET ORAL at 16:33

## 2023-01-01 RX ADMIN — INSULIN LISPRO 2 UNITS: 100 INJECTION, SOLUTION INTRAVENOUS; SUBCUTANEOUS at 17:00

## 2023-01-01 RX ADMIN — RIVAROXABAN 20 MG: 20 TABLET, FILM COATED ORAL at 16:34

## 2023-01-01 RX ADMIN — OXYCODONE HYDROCHLORIDE 10 MG: 5 TABLET ORAL at 09:00

## 2023-01-01 RX ADMIN — ALPRAZOLAM 2 MG: 0.5 TABLET ORAL at 01:06

## 2023-01-01 RX ADMIN — HEPARIN SODIUM 1900 UNITS/HR: 10000 INJECTION, SOLUTION INTRAVENOUS at 10:00

## 2023-01-01 RX ADMIN — HEPARIN SODIUM 5000 UNITS: 5000 INJECTION INTRAVENOUS; SUBCUTANEOUS at 01:02

## 2023-01-01 RX ADMIN — ROPINIROLE 2 MG: 2 TABLET, FILM COATED ORAL at 02:24

## 2023-01-01 RX ADMIN — ALPRAZOLAM 2 MG: 0.5 TABLET ORAL at 00:58

## 2023-01-01 RX ADMIN — MILRINONE LACTATE 0.38 MCG/KG/MIN: 200 INJECTION, SOLUTION INTRAVENOUS at 06:34

## 2023-01-01 RX ADMIN — EMPAGLIFLOZIN 10 MG: 10 TABLET, FILM COATED ORAL at 08:41

## 2023-01-01 RX ADMIN — DIPHENHYDRAMINE HYDROCHLORIDE 25 MG: 25 CAPSULE ORAL at 00:19

## 2023-01-01 RX ADMIN — TRAZODONE HYDROCHLORIDE 150 MG: 50 TABLET ORAL at 21:12

## 2023-01-01 RX ADMIN — DOCUSATE SODIUM 100 MG: 100 CAPSULE, LIQUID FILLED ORAL at 09:02

## 2023-01-01 RX ADMIN — MILRINONE LACTATE 0.38 MCG/KG/MIN: 200 INJECTION, SOLUTION INTRAVENOUS at 22:33

## 2023-01-01 RX ADMIN — ALPRAZOLAM 2 MG: 0.5 TABLET ORAL at 00:19

## 2023-01-01 RX ADMIN — INSULIN LISPRO 1 UNITS: 100 INJECTION, SOLUTION INTRAVENOUS; SUBCUTANEOUS at 17:33

## 2023-01-01 RX ADMIN — QUETIAPINE FUMARATE 200 MG: 100 TABLET ORAL at 20:53

## 2023-01-01 RX ADMIN — EMPAGLIFLOZIN 10 MG: 10 TABLET, FILM COATED ORAL at 08:38

## 2023-01-01 RX ADMIN — MILRINONE LACTATE 0.38 MCG/KG/MIN: 200 INJECTION, SOLUTION INTRAVENOUS at 06:14

## 2023-01-01 RX ADMIN — ALPRAZOLAM 2 MG: 0.5 TABLET ORAL at 15:28

## 2023-01-01 RX ADMIN — MILRINONE LACTATE 0.38 MCG/KG/MIN: 200 INJECTION, SOLUTION INTRAVENOUS at 20:31

## 2023-01-01 RX ADMIN — HYDRALAZINE HYDROCHLORIDE 50 MG: 50 TABLET, FILM COATED ORAL at 20:30

## 2023-01-01 RX ADMIN — MILRINONE LACTATE 0.38 MCG/KG/MIN: 200 INJECTION, SOLUTION INTRAVENOUS at 12:13

## 2023-01-01 RX ADMIN — ATORVASTATIN CALCIUM 40 MG: 40 TABLET, FILM COATED ORAL at 21:23

## 2023-01-01 RX ADMIN — ACETAMINOPHEN 975 MG: 325 TABLET ORAL at 09:20

## 2023-01-01 RX ADMIN — QUETIAPINE FUMARATE 200 MG: 100 TABLET ORAL at 20:31

## 2023-01-01 RX ADMIN — ALPRAZOLAM 2 MG: 0.5 TABLET ORAL at 18:53

## 2023-01-01 RX ADMIN — Medication 0.16 MCG/KG/MIN: at 10:19

## 2023-01-01 RX ADMIN — POTASSIUM CHLORIDE 20 MEQ: 1500 TABLET, EXTENDED RELEASE ORAL at 06:13

## 2023-01-01 RX ADMIN — EZETIMIBE 10 MG: 10 TABLET ORAL at 08:21

## 2023-01-01 RX ADMIN — ACETAMINOPHEN 975 MG: 325 TABLET ORAL at 20:34

## 2023-01-01 RX ADMIN — EZETIMIBE 10 MG: 10 TABLET ORAL at 08:41

## 2023-01-01 RX ADMIN — INSULIN LISPRO 2 UNITS: 100 INJECTION, SOLUTION INTRAVENOUS; SUBCUTANEOUS at 12:04

## 2023-01-01 RX ADMIN — TRAZODONE HYDROCHLORIDE 150 MG: 50 TABLET ORAL at 20:30

## 2023-01-01 RX ADMIN — ONDANSETRON 4 MG: 2 INJECTION INTRAMUSCULAR; INTRAVENOUS at 18:50

## 2023-01-01 RX ADMIN — ISOSORBIDE DINITRATE 10 MG: 10 TABLET ORAL at 20:40

## 2023-01-01 RX ADMIN — FUROSEMIDE 40 MG: 10 INJECTION, SOLUTION INTRAVENOUS at 11:42

## 2023-01-01 RX ADMIN — Medication 0.1 MCG/KG/MIN: at 05:29

## 2023-01-01 RX ADMIN — MILRINONE LACTATE 0.25 MCG/KG/MIN: 200 INJECTION, SOLUTION INTRAVENOUS at 16:00

## 2023-01-01 RX ADMIN — QUETIAPINE FUMARATE 200 MG: 100 TABLET ORAL at 20:08

## 2023-01-01 RX ADMIN — SPIRONOLACTONE 25 MG: 25 TABLET ORAL at 08:10

## 2023-01-01 RX ADMIN — DULOXETINE HYDROCHLORIDE 30 MG: 30 CAPSULE, DELAYED RELEASE ORAL at 09:01

## 2023-01-01 RX ADMIN — MILRINONE LACTATE 0.38 MCG/KG/MIN: 200 INJECTION, SOLUTION INTRAVENOUS at 02:55

## 2023-01-01 RX ADMIN — ROPINIROLE 2 MG: 2 TABLET, FILM COATED ORAL at 08:21

## 2023-01-01 RX ADMIN — OXYCODONE HYDROCHLORIDE 5 MG: 5 TABLET ORAL at 15:28

## 2023-01-01 RX ADMIN — SPIRONOLACTONE 25 MG: 25 TABLET ORAL at 08:32

## 2023-01-01 RX ADMIN — OXYCODONE HYDROCHLORIDE 5 MG: 5 TABLET ORAL at 08:20

## 2023-01-01 RX ADMIN — EMPAGLIFLOZIN 10 MG: 10 TABLET, FILM COATED ORAL at 08:14

## 2023-01-01 RX ADMIN — OXYCODONE HYDROCHLORIDE 5 MG: 5 TABLET ORAL at 09:25

## 2023-01-01 RX ADMIN — QUETIAPINE FUMARATE 200 MG: 100 TABLET ORAL at 20:40

## 2023-01-01 RX ADMIN — DULOXETINE HYDROCHLORIDE 30 MG: 30 CAPSULE, DELAYED RELEASE ORAL at 20:33

## 2023-01-01 RX ADMIN — HYDROXYZINE HYDROCHLORIDE 25 MG: 25 TABLET, FILM COATED ORAL at 22:20

## 2023-01-01 RX ADMIN — OXYCODONE HYDROCHLORIDE 5 MG: 5 TABLET ORAL at 16:51

## 2023-01-01 RX ADMIN — ALPRAZOLAM 2 MG: 0.5 TABLET ORAL at 21:23

## 2023-01-01 RX ADMIN — MILRINONE LACTATE 0.38 MCG/KG/MIN: 200 INJECTION, SOLUTION INTRAVENOUS at 16:15

## 2023-01-01 RX ADMIN — SPIRONOLACTONE 25 MG: 25 TABLET ORAL at 08:24

## 2023-01-01 RX ADMIN — HYDRALAZINE HYDROCHLORIDE 25 MG: 25 TABLET, FILM COATED ORAL at 20:40

## 2023-01-01 RX ADMIN — HYDRALAZINE HYDROCHLORIDE 25 MG: 25 TABLET, FILM COATED ORAL at 04:24

## 2023-01-01 RX ADMIN — ROPINIROLE 2 MG: 2 TABLET, FILM COATED ORAL at 20:40

## 2023-01-01 RX ADMIN — OXYCODONE HYDROCHLORIDE 5 MG: 5 TABLET ORAL at 19:48

## 2023-01-01 RX ADMIN — TORSEMIDE 100 MG: 100 TABLET ORAL at 09:02

## 2023-01-01 RX ADMIN — TRAZODONE HYDROCHLORIDE 150 MG: 50 TABLET ORAL at 22:36

## 2023-01-01 RX ADMIN — POTASSIUM CHLORIDE 20 MEQ: 1500 TABLET, EXTENDED RELEASE ORAL at 08:10

## 2023-01-01 RX ADMIN — HYDRALAZINE HYDROCHLORIDE 25 MG: 25 TABLET, FILM COATED ORAL at 11:40

## 2023-01-01 RX ADMIN — QUETIAPINE FUMARATE 200 MG: 100 TABLET ORAL at 21:10

## 2023-01-01 RX ADMIN — SODIUM NITROPRUSSIDE 0.3 MCG/KG/MIN: 0.5 INJECTION, SOLUTION INTRAVENOUS at 21:34

## 2023-01-01 RX ADMIN — HYDRALAZINE HYDROCHLORIDE 25 MG: 25 TABLET, FILM COATED ORAL at 11:42

## 2023-01-01 RX ADMIN — ALPRAZOLAM 2 MG: 0.5 TABLET ORAL at 08:30

## 2023-01-01 RX ADMIN — MILRINONE LACTATE 0.38 MCG/KG/MIN: 200 INJECTION, SOLUTION INTRAVENOUS at 22:19

## 2023-01-01 RX ADMIN — EMPAGLIFLOZIN 25 MG: 10 TABLET, FILM COATED ORAL at 09:01

## 2023-01-01 RX ADMIN — ALPRAZOLAM 2 MG: 0.5 TABLET ORAL at 21:12

## 2023-01-01 RX ADMIN — Medication 2 L/MIN: at 08:30

## 2023-01-01 RX ADMIN — MILRINONE LACTATE 0.38 MCG/KG/MIN: 200 INJECTION, SOLUTION INTRAVENOUS at 06:15

## 2023-01-01 RX ADMIN — MAGNESIUM SULFATE HEPTAHYDRATE 2 G: 40 INJECTION, SOLUTION INTRAVENOUS at 08:14

## 2023-01-01 RX ADMIN — ACETAMINOPHEN 975 MG: 325 TABLET ORAL at 21:24

## 2023-01-01 RX ADMIN — OXYCODONE HYDROCHLORIDE 5 MG: 5 TABLET ORAL at 06:07

## 2023-01-01 RX ADMIN — ONDANSETRON 4 MG: 2 INJECTION INTRAMUSCULAR; INTRAVENOUS at 11:49

## 2023-01-01 RX ADMIN — DULOXETINE HYDROCHLORIDE 30 MG: 30 CAPSULE, DELAYED RELEASE ORAL at 22:20

## 2023-01-01 RX ADMIN — DULOXETINE HYDROCHLORIDE 30 MG: 30 CAPSULE, DELAYED RELEASE ORAL at 22:32

## 2023-01-01 RX ADMIN — DULOXETINE HYDROCHLORIDE 30 MG: 30 CAPSULE, DELAYED RELEASE ORAL at 21:12

## 2023-01-01 RX ADMIN — ACETAMINOPHEN 975 MG: 325 TABLET ORAL at 00:00

## 2023-01-01 RX ADMIN — DOCUSATE SODIUM 100 MG: 100 CAPSULE, LIQUID FILLED ORAL at 10:45

## 2023-01-01 RX ADMIN — HYDROXYZINE HYDROCHLORIDE 25 MG: 25 TABLET, FILM COATED ORAL at 16:36

## 2023-01-01 RX ADMIN — ALPRAZOLAM 2 MG: 0.5 TABLET ORAL at 00:28

## 2023-01-01 RX ADMIN — INSULIN LISPRO 1 UNITS: 100 INJECTION, SOLUTION INTRAVENOUS; SUBCUTANEOUS at 08:27

## 2023-01-01 RX ADMIN — EZETIMIBE 10 MG: 10 TABLET ORAL at 10:23

## 2023-01-01 RX ADMIN — MILRINONE LACTATE 0.38 MCG/KG/MIN: 200 INJECTION, SOLUTION INTRAVENOUS at 00:21

## 2023-01-01 RX ADMIN — OXYCODONE HYDROCHLORIDE 5 MG: 5 TABLET ORAL at 18:27

## 2023-01-01 RX ADMIN — OXYCODONE HYDROCHLORIDE 5 MG: 5 TABLET ORAL at 08:10

## 2023-01-01 RX ADMIN — SACUBITRIL AND VALSARTAN 1 TABLET: 24; 26 TABLET, FILM COATED ORAL at 08:22

## 2023-01-01 RX ADMIN — DULOXETINE HYDROCHLORIDE 30 MG: 30 CAPSULE, DELAYED RELEASE ORAL at 08:22

## 2023-01-01 RX ADMIN — HYDROXYZINE HYDROCHLORIDE 25 MG: 25 TABLET, FILM COATED ORAL at 01:06

## 2023-01-01 RX ADMIN — SPIRONOLACTONE 25 MG: 25 TABLET ORAL at 08:14

## 2023-01-01 RX ADMIN — POLYETHYLENE GLYCOL 3350 17 G: 17 POWDER, FOR SOLUTION ORAL at 08:23

## 2023-01-01 RX ADMIN — IPRATROPIUM BROMIDE AND ALBUTEROL SULFATE 3 ML: .5; 3 SOLUTION RESPIRATORY (INHALATION) at 13:01

## 2023-01-01 RX ADMIN — SODIUM CHLORIDE, POTASSIUM CHLORIDE, SODIUM LACTATE AND CALCIUM CHLORIDE 10 ML/HR: 600; 310; 30; 20 INJECTION, SOLUTION INTRAVENOUS at 16:00

## 2023-01-01 RX ADMIN — QUETIAPINE FUMARATE 200 MG: 100 TABLET ORAL at 01:05

## 2023-01-01 RX ADMIN — ACETAMINOPHEN 975 MG: 325 TABLET ORAL at 16:36

## 2023-01-01 RX ADMIN — TORSEMIDE 100 MG: 100 TABLET ORAL at 08:46

## 2023-01-01 RX ADMIN — OXYCODONE HYDROCHLORIDE 5 MG: 5 TABLET ORAL at 04:21

## 2023-01-01 RX ADMIN — MILRINONE LACTATE 0.38 MCG/KG/MIN: 200 INJECTION, SOLUTION INTRAVENOUS at 12:56

## 2023-01-01 RX ADMIN — OXYCODONE HYDROCHLORIDE 5 MG: 5 TABLET ORAL at 13:27

## 2023-01-01 RX ADMIN — OXYCODONE HYDROCHLORIDE 5 MG: 5 TABLET ORAL at 22:32

## 2023-01-01 RX ADMIN — HYDROXYZINE HYDROCHLORIDE 25 MG: 25 TABLET, FILM COATED ORAL at 00:00

## 2023-01-01 RX ADMIN — MILRINONE LACTATE 0.38 MCG/KG/MIN: 200 INJECTION, SOLUTION INTRAVENOUS at 02:03

## 2023-01-01 RX ADMIN — QUETIAPINE FUMARATE 200 MG: 100 TABLET ORAL at 22:33

## 2023-01-01 RX ADMIN — SPIRONOLACTONE 25 MG: 25 TABLET ORAL at 08:37

## 2023-01-01 RX ADMIN — MILRINONE LACTATE 0.38 MCG/KG/MIN: 200 INJECTION, SOLUTION INTRAVENOUS at 00:17

## 2023-01-01 RX ADMIN — MILRINONE LACTATE 0.38 MCG/KG/MIN: 200 INJECTION, SOLUTION INTRAVENOUS at 17:30

## 2023-01-01 RX ADMIN — ALPRAZOLAM 2 MG: 0.5 TABLET ORAL at 08:44

## 2023-01-01 RX ADMIN — HEPARIN SODIUM 1900 UNITS/HR: 10000 INJECTION, SOLUTION INTRAVENOUS at 12:13

## 2023-01-01 RX ADMIN — POLYETHYLENE GLYCOL 3350 17 G: 17 POWDER, FOR SOLUTION ORAL at 08:41

## 2023-01-01 RX ADMIN — EZETIMIBE 10 MG: 10 TABLET ORAL at 08:33

## 2023-01-01 RX ADMIN — ACETAMINOPHEN 650 MG: 325 TABLET ORAL at 18:31

## 2023-01-01 RX ADMIN — ONDANSETRON 4 MG: 2 INJECTION INTRAMUSCULAR; INTRAVENOUS at 14:41

## 2023-01-01 RX ADMIN — ROPINIROLE 2 MG: 2 TABLET, FILM COATED ORAL at 08:38

## 2023-01-01 RX ADMIN — HEPARIN SODIUM 1900 UNITS/HR: 10000 INJECTION, SOLUTION INTRAVENOUS at 00:03

## 2023-01-01 RX ADMIN — HYDRALAZINE HYDROCHLORIDE 25 MG: 25 TABLET, FILM COATED ORAL at 19:48

## 2023-01-01 RX ADMIN — DULOXETINE HYDROCHLORIDE 30 MG: 30 CAPSULE, DELAYED RELEASE ORAL at 20:53

## 2023-01-01 RX ADMIN — ATORVASTATIN CALCIUM 40 MG: 40 TABLET, FILM COATED ORAL at 20:33

## 2023-01-01 RX ADMIN — MILRINONE LACTATE 0.25 MCG/KG/MIN: 200 INJECTION, SOLUTION INTRAVENOUS at 18:50

## 2023-01-01 RX ADMIN — SPIRONOLACTONE 25 MG: 25 TABLET ORAL at 08:46

## 2023-01-01 RX ADMIN — ACETAMINOPHEN 975 MG: 325 TABLET ORAL at 19:48

## 2023-01-01 RX ADMIN — DIPHENHYDRAMINE HYDROCHLORIDE 25 MG: 25 CAPSULE ORAL at 08:20

## 2023-01-01 RX ADMIN — MAGNESIUM SULFATE HEPTAHYDRATE 2 G: 40 INJECTION, SOLUTION INTRAVENOUS at 10:00

## 2023-01-01 RX ADMIN — IPRATROPIUM BROMIDE AND ALBUTEROL SULFATE 3 ML: .5; 3 SOLUTION RESPIRATORY (INHALATION) at 20:06

## 2023-01-01 RX ADMIN — HEPARIN SODIUM 1700 UNITS/HR: 10000 INJECTION, SOLUTION INTRAVENOUS at 08:23

## 2023-01-01 RX ADMIN — MILRINONE LACTATE 0.25 MCG/KG/MIN: 200 INJECTION, SOLUTION INTRAVENOUS at 09:55

## 2023-01-01 RX ADMIN — MILRINONE LACTATE 0.38 MCG/KG/MIN: 200 INJECTION, SOLUTION INTRAVENOUS at 19:06

## 2023-01-01 RX ADMIN — SPIRONOLACTONE 25 MG: 25 TABLET ORAL at 08:41

## 2023-01-01 RX ADMIN — MILRINONE LACTATE 0.38 MCG/KG/MIN: 200 INJECTION, SOLUTION INTRAVENOUS at 11:04

## 2023-01-01 RX ADMIN — OXYCODONE HYDROCHLORIDE 5 MG: 5 TABLET ORAL at 20:34

## 2023-01-01 RX ADMIN — INSULIN LISPRO 1 UNITS: 100 INJECTION, SOLUTION INTRAVENOUS; SUBCUTANEOUS at 13:41

## 2023-01-01 RX ADMIN — TRAZODONE HYDROCHLORIDE 150 MG: 50 TABLET ORAL at 20:40

## 2023-01-01 RX ADMIN — ALPRAZOLAM 2 MG: 0.5 TABLET ORAL at 16:45

## 2023-01-01 RX ADMIN — OXYCODONE HYDROCHLORIDE 5 MG: 5 TABLET ORAL at 22:56

## 2023-01-01 RX ADMIN — ACETAMINOPHEN 975 MG: 325 TABLET ORAL at 04:21

## 2023-01-01 RX ADMIN — ATORVASTATIN CALCIUM 40 MG: 40 TABLET, FILM COATED ORAL at 20:31

## 2023-01-01 RX ADMIN — HYDROXYZINE HYDROCHLORIDE 25 MG: 25 TABLET, FILM COATED ORAL at 21:10

## 2023-01-01 RX ADMIN — ALPRAZOLAM 2 MG: 0.5 TABLET ORAL at 16:57

## 2023-01-01 RX ADMIN — FENTANYL CITRATE 125 MCG: 50 INJECTION, SOLUTION INTRAMUSCULAR; INTRAVENOUS at 16:00

## 2023-01-01 RX ADMIN — OXYCODONE HYDROCHLORIDE 5 MG: 5 TABLET ORAL at 00:19

## 2023-01-01 RX ADMIN — EMPAGLIFLOZIN 10 MG: 10 TABLET, FILM COATED ORAL at 08:24

## 2023-01-01 RX ADMIN — DULOXETINE HYDROCHLORIDE 30 MG: 30 CAPSULE, DELAYED RELEASE ORAL at 20:31

## 2023-01-01 RX ADMIN — ATORVASTATIN CALCIUM 40 MG: 40 TABLET, FILM COATED ORAL at 21:11

## 2023-01-01 RX ADMIN — ISOSORBIDE DINITRATE 10 MG: 10 TABLET ORAL at 08:14

## 2023-01-01 RX ADMIN — DULOXETINE HYDROCHLORIDE 30 MG: 30 CAPSULE, DELAYED RELEASE ORAL at 08:15

## 2023-01-01 RX ADMIN — SPIRONOLACTONE 25 MG: 25 TABLET ORAL at 08:26

## 2023-01-01 RX ADMIN — INSULIN LISPRO 1 UNITS: 100 INJECTION, SOLUTION INTRAVENOUS; SUBCUTANEOUS at 08:50

## 2023-01-01 RX ADMIN — MIDAZOLAM HYDROCHLORIDE 2 MG: 1 INJECTION INTRAMUSCULAR; INTRAVENOUS at 16:00

## 2023-01-01 RX ADMIN — EMPAGLIFLOZIN 10 MG: 10 TABLET, FILM COATED ORAL at 08:33

## 2023-01-01 RX ADMIN — ALPRAZOLAM 2 MG: 0.5 TABLET ORAL at 09:06

## 2023-01-01 RX ADMIN — MILRINONE LACTATE 0.38 MCG/KG/MIN: 200 INJECTION, SOLUTION INTRAVENOUS at 08:33

## 2023-01-01 RX ADMIN — OXYCODONE HYDROCHLORIDE 5 MG: 5 TABLET ORAL at 11:18

## 2023-01-01 RX ADMIN — MILRINONE LACTATE 0.38 MCG/KG/MIN: 200 INJECTION, SOLUTION INTRAVENOUS at 03:53

## 2023-01-01 RX ADMIN — DULOXETINE HYDROCHLORIDE 30 MG: 30 CAPSULE, DELAYED RELEASE ORAL at 08:23

## 2023-01-01 RX ADMIN — HYDRALAZINE HYDROCHLORIDE 25 MG: 25 TABLET, FILM COATED ORAL at 04:21

## 2023-01-01 RX ADMIN — ROPINIROLE 2 MG: 2 TABLET, FILM COATED ORAL at 22:32

## 2023-01-01 RX ADMIN — MILRINONE LACTATE 0.38 MCG/KG/MIN: 200 INJECTION, SOLUTION INTRAVENOUS at 14:18

## 2023-01-01 RX ADMIN — OXYCODONE HYDROCHLORIDE 5 MG: 5 TABLET ORAL at 01:15

## 2023-01-01 RX ADMIN — ATORVASTATIN CALCIUM 40 MG: 40 TABLET, FILM COATED ORAL at 21:10

## 2023-01-01 RX ADMIN — OXYCODONE HYDROCHLORIDE 5 MG: 5 TABLET ORAL at 12:56

## 2023-01-01 RX ADMIN — MILRINONE LACTATE 0.38 MCG/KG/MIN: 200 INJECTION, SOLUTION INTRAVENOUS at 16:03

## 2023-01-01 RX ADMIN — EMPAGLIFLOZIN 25 MG: 10 TABLET, FILM COATED ORAL at 08:46

## 2023-01-01 RX ADMIN — OXYCODONE HYDROCHLORIDE 5 MG: 5 TABLET ORAL at 11:00

## 2023-01-01 RX ADMIN — ALPRAZOLAM 2 MG: 0.5 TABLET ORAL at 08:49

## 2023-01-01 RX ADMIN — TORSEMIDE 100 MG: 100 TABLET ORAL at 14:44

## 2023-01-01 RX ADMIN — RIVAROXABAN 20 MG: 20 TABLET, FILM COATED ORAL at 16:51

## 2023-01-01 RX ADMIN — POTASSIUM CHLORIDE 40 MEQ: 29.8 INJECTION, SOLUTION INTRAVENOUS at 10:00

## 2023-01-01 RX ADMIN — DULOXETINE HYDROCHLORIDE 30 MG: 30 CAPSULE, DELAYED RELEASE ORAL at 20:08

## 2023-01-01 RX ADMIN — DULOXETINE HYDROCHLORIDE 30 MG: 30 CAPSULE, DELAYED RELEASE ORAL at 08:14

## 2023-01-01 RX ADMIN — MILRINONE LACTATE 0.25 MCG/KG/MIN: 200 INJECTION, SOLUTION INTRAVENOUS at 18:00

## 2023-01-01 RX ADMIN — ACETAMINOPHEN 650 MG: 325 TABLET ORAL at 09:00

## 2023-01-01 RX ADMIN — HEPARIN SODIUM 1900 UNITS/HR: 10000 INJECTION, SOLUTION INTRAVENOUS at 08:27

## 2023-01-01 RX ADMIN — NOREPINEPHRINE BITARTRATE 0.05 MCG/KG/MIN: 8 INJECTION, SOLUTION INTRAVENOUS at 09:54

## 2023-01-01 RX ADMIN — ROPINIROLE 2 MG: 2 TABLET, FILM COATED ORAL at 20:08

## 2023-01-01 RX ADMIN — OXYCODONE HYDROCHLORIDE 5 MG: 5 TABLET ORAL at 12:27

## 2023-01-01 RX ADMIN — SODIUM CHLORIDE, POTASSIUM CHLORIDE, SODIUM LACTATE AND CALCIUM CHLORIDE 500 ML: 600; 310; 30; 20 INJECTION, SOLUTION INTRAVENOUS at 01:55

## 2023-01-01 RX ADMIN — TRAZODONE HYDROCHLORIDE 150 MG: 50 TABLET ORAL at 21:10

## 2023-01-01 RX ADMIN — OXYCODONE HYDROCHLORIDE 5 MG: 5 TABLET ORAL at 03:27

## 2023-01-01 RX ADMIN — EZETIMIBE 10 MG: 10 TABLET ORAL at 09:58

## 2023-01-01 RX ADMIN — SODIUM NITROPRUSSIDE 1 MCG/KG/MIN: 0.5 INJECTION, SOLUTION INTRAVENOUS at 03:04

## 2023-01-01 RX ADMIN — EZETIMIBE 10 MG: 10 TABLET ORAL at 08:38

## 2023-01-01 RX ADMIN — ROPINIROLE 2 MG: 2 TABLET, FILM COATED ORAL at 20:53

## 2023-01-01 RX ADMIN — ACETAMINOPHEN 975 MG: 325 TABLET ORAL at 11:17

## 2023-01-01 RX ADMIN — Medication: at 18:45

## 2023-01-01 RX ADMIN — OXYCODONE HYDROCHLORIDE 5 MG: 5 TABLET ORAL at 19:32

## 2023-01-01 RX ADMIN — MIDAZOLAM HYDROCHLORIDE 2 MG: 1 INJECTION, SOLUTION INTRAMUSCULAR; INTRAVENOUS at 16:00

## 2023-01-01 RX ADMIN — HYDROXYZINE HYDROCHLORIDE 25 MG: 25 TABLET, FILM COATED ORAL at 21:23

## 2023-01-01 RX ADMIN — SPIRONOLACTONE 25 MG: 25 TABLET ORAL at 09:01

## 2023-01-01 RX ADMIN — HEPARIN SODIUM 1900 UNITS/HR: 10000 INJECTION, SOLUTION INTRAVENOUS at 20:41

## 2023-01-01 RX ADMIN — EMPAGLIFLOZIN 10 MG: 10 TABLET, FILM COATED ORAL at 08:20

## 2023-01-01 RX ADMIN — SACUBITRIL AND VALSARTAN 1 TABLET: 24; 26 TABLET, FILM COATED ORAL at 20:30

## 2023-01-01 RX ADMIN — SACUBITRIL AND VALSARTAN 1 TABLET: 24; 26 TABLET, FILM COATED ORAL at 20:52

## 2023-01-01 RX ADMIN — EMPAGLIFLOZIN 10 MG: 10 TABLET, FILM COATED ORAL at 08:44

## 2023-01-01 RX ADMIN — SACUBITRIL AND VALSARTAN 1 TABLET: 24; 26 TABLET, FILM COATED ORAL at 08:24

## 2023-01-01 RX ADMIN — HEPARIN SODIUM 1800 UNITS/HR: 10000 INJECTION, SOLUTION INTRAVENOUS at 19:14

## 2023-01-01 RX ADMIN — OXYCODONE HYDROCHLORIDE 5 MG: 5 TABLET ORAL at 05:14

## 2023-01-01 RX ADMIN — OXYCODONE HYDROCHLORIDE 5 MG: 5 TABLET ORAL at 00:58

## 2023-01-01 RX ADMIN — OXYCODONE HYDROCHLORIDE 5 MG: 5 TABLET ORAL at 00:17

## 2023-01-01 RX ADMIN — MILRINONE LACTATE 0.38 MCG/KG/MIN: 200 INJECTION, SOLUTION INTRAVENOUS at 03:13

## 2023-01-01 RX ADMIN — MILRINONE LACTATE 0.38 MCG/KG/MIN: 200 INJECTION, SOLUTION INTRAVENOUS at 18:26

## 2023-01-01 RX ADMIN — ACETAMINOPHEN 975 MG: 325 TABLET ORAL at 21:10

## 2023-01-01 RX ADMIN — OXYCODONE HYDROCHLORIDE 5 MG: 5 TABLET ORAL at 01:06

## 2023-01-01 RX ADMIN — POTASSIUM CHLORIDE 40 MEQ: 1500 TABLET, EXTENDED RELEASE ORAL at 20:30

## 2023-01-01 RX ADMIN — HYDRALAZINE HYDROCHLORIDE 25 MG: 25 TABLET, FILM COATED ORAL at 03:57

## 2023-01-01 RX ADMIN — MILRINONE LACTATE 0.38 MCG/KG/MIN: 200 INJECTION, SOLUTION INTRAVENOUS at 19:34

## 2023-01-01 RX ADMIN — HYDROMORPHONE HYDROCHLORIDE 2 MG: 2 TABLET ORAL at 16:30

## 2023-01-01 RX ADMIN — SPIRONOLACTONE 25 MG: 25 TABLET ORAL at 08:45

## 2023-01-01 RX ADMIN — OXYCODONE HYDROCHLORIDE 5 MG: 5 TABLET ORAL at 02:26

## 2023-01-01 RX ADMIN — ACETAMINOPHEN 975 MG: 325 TABLET ORAL at 04:35

## 2023-01-01 RX ADMIN — MILRINONE LACTATE 0.38 MCG/KG/MIN: 200 INJECTION, SOLUTION INTRAVENOUS at 13:26

## 2023-01-01 RX ADMIN — HEPARIN SODIUM 1900 UNITS/HR: 10000 INJECTION, SOLUTION INTRAVENOUS at 17:30

## 2023-01-01 RX ADMIN — SACUBITRIL AND VALSARTAN 1 TABLET: 24; 26 TABLET, FILM COATED ORAL at 08:38

## 2023-01-01 RX ADMIN — HEPARIN SODIUM 1900 UNITS/HR: 10000 INJECTION, SOLUTION INTRAVENOUS at 09:55

## 2023-01-01 RX ADMIN — HEPARIN SODIUM 1900 UNITS/HR: 10000 INJECTION, SOLUTION INTRAVENOUS at 05:40

## 2023-01-01 RX ADMIN — ALPRAZOLAM 2 MG: 0.5 TABLET ORAL at 09:25

## 2023-01-01 RX ADMIN — IPRATROPIUM BROMIDE AND ALBUTEROL SULFATE 3 ML: .5; 3 SOLUTION RESPIRATORY (INHALATION) at 19:43

## 2023-01-01 RX ADMIN — ROPINIROLE 2 MG: 2 TABLET, FILM COATED ORAL at 20:34

## 2023-01-01 RX ADMIN — QUETIAPINE FUMARATE 200 MG: 100 TABLET ORAL at 21:11

## 2023-01-01 RX ADMIN — SODIUM NITROPRUSSIDE 0.3 MCG/KG/MIN: 0.5 INJECTION, SOLUTION INTRAVENOUS at 18:45

## 2023-01-01 RX ADMIN — OXYCODONE HYDROCHLORIDE 5 MG: 5 TABLET ORAL at 16:36

## 2023-01-01 RX ADMIN — SACUBITRIL AND VALSARTAN 1 TABLET: 24; 26 TABLET, FILM COATED ORAL at 20:33

## 2023-01-01 RX ADMIN — INSULIN LISPRO 2 UNITS: 100 INJECTION, SOLUTION INTRAVENOUS; SUBCUTANEOUS at 08:39

## 2023-01-01 RX ADMIN — MILRINONE LACTATE 0.38 MCG/KG/MIN: 200 INJECTION, SOLUTION INTRAVENOUS at 05:14

## 2023-01-01 RX ADMIN — OXYCODONE HYDROCHLORIDE 5 MG: 5 TABLET ORAL at 00:28

## 2023-01-01 RX ADMIN — DOCUSATE SODIUM 100 MG: 100 CAPSULE, LIQUID FILLED ORAL at 22:35

## 2023-01-01 RX ADMIN — INSULIN LISPRO 2 UNITS: 100 INJECTION, SOLUTION INTRAVENOUS; SUBCUTANEOUS at 11:42

## 2023-01-01 RX ADMIN — ALPRAZOLAM 2 MG: 0.5 TABLET ORAL at 16:34

## 2023-01-01 RX ADMIN — HYDRALAZINE HYDROCHLORIDE 25 MG: 25 TABLET, FILM COATED ORAL at 14:34

## 2023-01-01 RX ADMIN — ALPRAZOLAM 2 MG: 0.5 TABLET ORAL at 01:15

## 2023-01-01 RX ADMIN — OXYCODONE HYDROCHLORIDE 5 MG: 5 TABLET ORAL at 13:01

## 2023-01-01 RX ADMIN — OXYCODONE HYDROCHLORIDE 5 MG: 5 TABLET ORAL at 08:33

## 2023-01-01 RX ADMIN — OXYCODONE HYDROCHLORIDE 5 MG: 5 TABLET ORAL at 06:14

## 2023-01-01 RX ADMIN — QUETIAPINE FUMARATE 200 MG: 100 TABLET ORAL at 20:33

## 2023-01-01 RX ADMIN — OXYCODONE HYDROCHLORIDE 10 MG: 5 TABLET ORAL at 18:31

## 2023-01-01 RX ADMIN — OXYCODONE HYDROCHLORIDE 5 MG: 5 TABLET ORAL at 18:53

## 2023-01-01 RX ADMIN — INSULIN LISPRO 2 UNITS: 100 INJECTION, SOLUTION INTRAVENOUS; SUBCUTANEOUS at 12:10

## 2023-01-01 RX ADMIN — ALPRAZOLAM 2 MG: 0.5 TABLET ORAL at 15:32

## 2023-01-01 RX ADMIN — ROPINIROLE 2 MG: 2 TABLET, FILM COATED ORAL at 08:24

## 2023-01-01 RX ADMIN — ACETAMINOPHEN 975 MG: 325 TABLET ORAL at 20:08

## 2023-01-01 RX ADMIN — ONDANSETRON 4 MG: 2 INJECTION INTRAMUSCULAR; INTRAVENOUS at 20:16

## 2023-01-01 RX ADMIN — OXYCODONE HYDROCHLORIDE 5 MG: 5 TABLET ORAL at 14:45

## 2023-01-01 RX ADMIN — INSULIN LISPRO 3 UNITS: 100 INJECTION, SOLUTION INTRAVENOUS; SUBCUTANEOUS at 11:34

## 2023-01-01 RX ADMIN — INSULIN LISPRO 1 UNITS: 100 INJECTION, SOLUTION INTRAVENOUS; SUBCUTANEOUS at 11:49

## 2023-01-01 RX ADMIN — ALPRAZOLAM 2 MG: 0.5 TABLET ORAL at 08:15

## 2023-01-01 RX ADMIN — ACETAMINOPHEN 975 MG: 325 TABLET ORAL at 15:30

## 2023-01-01 RX ADMIN — SACUBITRIL AND VALSARTAN 1 TABLET: 24; 26 TABLET, FILM COATED ORAL at 21:24

## 2023-01-01 RX ADMIN — INSULIN LISPRO 2 UNITS: 100 INJECTION, SOLUTION INTRAVENOUS; SUBCUTANEOUS at 16:30

## 2023-01-01 RX ADMIN — TRAZODONE HYDROCHLORIDE 150 MG: 50 TABLET ORAL at 21:24

## 2023-01-01 RX ADMIN — EZETIMIBE 10 MG: 10 TABLET ORAL at 08:23

## 2023-01-01 RX ADMIN — ALPRAZOLAM 2 MG: 0.5 TABLET ORAL at 08:32

## 2023-01-01 RX ADMIN — HYDROMORPHONE HYDROCHLORIDE 2 MG: 2 TABLET ORAL at 00:18

## 2023-01-01 RX ADMIN — RIVAROXABAN 20 MG: 20 TABLET, FILM COATED ORAL at 16:57

## 2023-01-01 RX ADMIN — OXYCODONE HYDROCHLORIDE 5 MG: 5 TABLET ORAL at 08:38

## 2023-01-01 RX ADMIN — HEPARIN SODIUM 1900 UNITS/HR: 10000 INJECTION, SOLUTION INTRAVENOUS at 08:33

## 2023-01-01 RX ADMIN — SPIRONOLACTONE 25 MG: 25 TABLET ORAL at 08:50

## 2023-01-01 RX ADMIN — INSULIN LISPRO 1 UNITS: 100 INJECTION, SOLUTION INTRAVENOUS; SUBCUTANEOUS at 18:27

## 2023-01-01 RX ADMIN — ALPRAZOLAM 2 MG: 0.5 TABLET ORAL at 17:22

## 2023-01-01 RX ADMIN — ATORVASTATIN CALCIUM 40 MG: 40 TABLET, FILM COATED ORAL at 20:40

## 2023-01-01 RX ADMIN — INSULIN LISPRO 1 UNITS: 100 INJECTION, SOLUTION INTRAVENOUS; SUBCUTANEOUS at 12:59

## 2023-01-01 RX ADMIN — ONDANSETRON 4 MG: 2 INJECTION INTRAMUSCULAR; INTRAVENOUS at 14:34

## 2023-01-01 RX ADMIN — OXYCODONE HYDROCHLORIDE 5 MG: 5 TABLET ORAL at 09:05

## 2023-01-01 RX ADMIN — TRAZODONE HYDROCHLORIDE 150 MG: 50 TABLET ORAL at 20:07

## 2023-01-01 RX ADMIN — DOCUSATE SODIUM 100 MG: 100 CAPSULE, LIQUID FILLED ORAL at 20:34

## 2023-01-01 RX ADMIN — ALPRAZOLAM 2 MG: 0.5 TABLET ORAL at 08:10

## 2023-01-01 RX ADMIN — MILRINONE LACTATE 0.38 MCG/KG/MIN: 200 INJECTION, SOLUTION INTRAVENOUS at 06:07

## 2023-01-01 RX ADMIN — INSULIN LISPRO 2 UNITS: 100 INJECTION, SOLUTION INTRAVENOUS; SUBCUTANEOUS at 08:46

## 2023-01-01 RX ADMIN — DULOXETINE HYDROCHLORIDE 30 MG: 30 CAPSULE, DELAYED RELEASE ORAL at 08:44

## 2023-01-01 RX ADMIN — ISOSORBIDE DINITRATE 10 MG: 10 TABLET ORAL at 19:48

## 2023-01-01 RX ADMIN — ALPRAZOLAM 2 MG: 0.5 TABLET ORAL at 16:35

## 2023-01-01 RX ADMIN — MILRINONE LACTATE 0.38 MCG/KG/MIN: 200 INJECTION, SOLUTION INTRAVENOUS at 22:39

## 2023-01-01 RX ADMIN — HEPARIN SODIUM 1900 UNITS/HR: 10000 INJECTION, SOLUTION INTRAVENOUS at 22:39

## 2023-01-01 RX ADMIN — INSULIN LISPRO 2 UNITS: 100 INJECTION, SOLUTION INTRAVENOUS; SUBCUTANEOUS at 08:41

## 2023-01-01 RX ADMIN — ATORVASTATIN CALCIUM 40 MG: 40 TABLET, FILM COATED ORAL at 20:53

## 2023-01-01 RX ADMIN — OXYCODONE HYDROCHLORIDE 5 MG: 5 TABLET ORAL at 14:25

## 2023-01-01 RX ADMIN — ALPRAZOLAM 2 MG: 0.5 TABLET ORAL at 18:27

## 2023-01-01 RX ADMIN — OXYCODONE HYDROCHLORIDE 5 MG: 5 TABLET ORAL at 21:14

## 2023-01-01 RX ADMIN — TRAZODONE HYDROCHLORIDE 150 MG: 50 TABLET ORAL at 20:55

## 2023-01-01 SDOH — ECONOMIC STABILITY: FOOD INSECURITY: WITHIN THE PAST 12 MONTHS, THE FOOD YOU BOUGHT JUST DIDN'T LAST AND YOU DIDN'T HAVE MONEY TO GET MORE.: NEVER TRUE

## 2023-01-01 SDOH — ECONOMIC STABILITY: TRANSPORTATION INSECURITY
IN THE PAST 12 MONTHS, HAS LACK OF TRANSPORTATION KEPT YOU FROM MEETINGS, WORK, OR FROM GETTING THINGS NEEDED FOR DAILY LIVING?: NO

## 2023-01-01 SDOH — ECONOMIC STABILITY: INCOME INSECURITY: IN THE PAST 12 MONTHS, HAS THE ELECTRIC, GAS, OIL, OR WATER COMPANY THREATENED TO SHUT OFF SERVICE IN YOUR HOME?: NO

## 2023-01-01 SDOH — SOCIAL STABILITY: SOCIAL INSECURITY
WITHIN THE LAST YEAR, HAVE TO BEEN RAPED OR FORCED TO HAVE ANY KIND OF SEXUAL ACTIVITY BY YOUR PARTNER OR EX-PARTNER?: NO

## 2023-01-01 SDOH — ECONOMIC STABILITY: HOUSING INSECURITY
IN THE LAST 12 MONTHS, WAS THERE A TIME WHEN YOU DID NOT HAVE A STEADY PLACE TO SLEEP OR SLEPT IN A SHELTER (INCLUDING NOW)?: NO

## 2023-01-01 SDOH — SOCIAL STABILITY: SOCIAL INSECURITY
WITHIN THE LAST YEAR, HAVE YOU BEEN KICKED, HIT, SLAPPED, OR OTHERWISE PHYSICALLY HURT BY YOUR PARTNER OR EX-PARTNER?: NO

## 2023-01-01 SDOH — ECONOMIC STABILITY: HOUSING INSECURITY: IN THE LAST 12 MONTHS, HOW MANY PLACES HAVE YOU LIVED?: 1

## 2023-01-01 SDOH — ECONOMIC STABILITY: FOOD INSECURITY: WITHIN THE PAST 12 MONTHS, YOU WORRIED THAT YOUR FOOD WOULD RUN OUT BEFORE YOU GOT MONEY TO BUY MORE.: NEVER TRUE

## 2023-01-01 SDOH — ECONOMIC STABILITY: INCOME INSECURITY: IN THE LAST 12 MONTHS, WAS THERE A TIME WHEN YOU WERE NOT ABLE TO PAY THE MORTGAGE OR RENT ON TIME?: NO

## 2023-01-01 SDOH — SOCIAL STABILITY: SOCIAL INSECURITY: WITHIN THE LAST YEAR, HAVE YOU BEEN AFRAID OF YOUR PARTNER OR EX-PARTNER?: NO

## 2023-01-01 SDOH — SOCIAL STABILITY: SOCIAL INSECURITY: WITHIN THE LAST YEAR, HAVE YOU BEEN HUMILIATED OR EMOTIONALLY ABUSED IN OTHER WAYS BY YOUR PARTNER OR EX-PARTNER?: NO

## 2023-01-01 SDOH — ECONOMIC STABILITY: TRANSPORTATION INSECURITY
IN THE PAST 12 MONTHS, HAS THE LACK OF TRANSPORTATION KEPT YOU FROM MEDICAL APPOINTMENTS OR FROM GETTING MEDICATIONS?: NO

## 2023-01-01 ASSESSMENT — ENCOUNTER SYMPTOMS
NAUSEA: 1
ACTIVITY CHANGE: 0
GASTROINTESTINAL NEGATIVE: 1
ABDOMINAL PAIN: 0
AGITATION: 0
FLANK PAIN: 0
COLOR CHANGE: 0
CONSTITUTIONAL NEGATIVE: 1
WHEEZING: 0
FREQUENCY: 0
MYALGIAS: 1
APPETITE CHANGE: 0
FATIGUE: 1
PSYCHIATRIC NEGATIVE: 1
NECK PAIN: 0
CHEST TIGHTNESS: 0
EYES NEGATIVE: 1
CONFUSION: 0
DIARRHEA: 0
NEUROLOGICAL NEGATIVE: 1
CONSTIPATION: 0
NECK PAIN: 1
DYSURIA: 0
WEAKNESS: 0
CHILLS: 0
DIFFICULTY URINATING: 0
FACIAL SWELLING: 0
BACK PAIN: 1
BACK PAIN: 1
SHORTNESS OF BREATH: 1
DIZZINESS: 0
APNEA: 0
RHINORRHEA: 0
ABDOMINAL DISTENTION: 1
HEADACHES: 0
ARTHRALGIAS: 1
VOMITING: 1
CHEST TIGHTNESS: 1
PALPITATIONS: 0
COUGH: 0

## 2023-01-01 ASSESSMENT — PAIN SCALES - GENERAL
PAINLEVEL_OUTOF10: 8
PAINLEVEL_OUTOF10: 9
PAINLEVEL_OUTOF10: 4
PAINLEVEL_OUTOF10: 7
PAINLEVEL_OUTOF10: 5 - MODERATE PAIN
PAINLEVEL_OUTOF10: 8
PAINLEVEL_OUTOF10: 0 - NO PAIN
PAINLEVEL_OUTOF10: 7
PAINLEVEL_OUTOF10: 8
PAINLEVEL_OUTOF10: 10 - WORST POSSIBLE PAIN
PAINLEVEL_OUTOF10: 8
PAINLEVEL_OUTOF10: 7
PAINLEVEL_OUTOF10: 8
PAINLEVEL_OUTOF10: 9
PAINLEVEL_OUTOF10: 8
PAINLEVEL_OUTOF10: 6
PAINLEVEL_OUTOF10: 5 - MODERATE PAIN
PAINLEVEL_OUTOF10: 8
PAINLEVEL_OUTOF10: 5 - MODERATE PAIN
PAINLEVEL_OUTOF10: 8
PAINLEVEL_OUTOF10: 9
PAINLEVEL_OUTOF10: 9
PAINLEVEL_OUTOF10: 5 - MODERATE PAIN
PAINLEVEL_OUTOF10: 8
PAINLEVEL_OUTOF10: 8
PAINLEVEL_OUTOF10: 9
PAINLEVEL_OUTOF10: 7
PAINLEVEL_OUTOF10: 7
PAINLEVEL_OUTOF10: 8
PAINLEVEL_OUTOF10: 0 - NO PAIN
PAINLEVEL_OUTOF10: 7
PAINLEVEL_OUTOF10: 8
PAINLEVEL_OUTOF10: 9
PAINLEVEL_OUTOF10: 4
PAINLEVEL_OUTOF10: 8
PAINLEVEL_OUTOF10: 5 - MODERATE PAIN
PAINLEVEL_OUTOF10: 6
PAINLEVEL_OUTOF10: 9
PAINLEVEL_OUTOF10: 8
PAINLEVEL_OUTOF10: 7
PAINLEVEL_OUTOF10: 8
PAINLEVEL_OUTOF10: 9
PAINLEVEL_OUTOF10: 0 - NO PAIN
PAINLEVEL_OUTOF10: 9
PAINLEVEL_OUTOF10: 10 - WORST POSSIBLE PAIN
PAINLEVEL_OUTOF10: 7
PAINLEVEL_OUTOF10: 5 - MODERATE PAIN
PAINLEVEL_OUTOF10: 8
PAINLEVEL_OUTOF10: 6
PAINLEVEL_OUTOF10: 7
PAINLEVEL_OUTOF10: 9
PAINLEVEL_OUTOF10: 7
PAINLEVEL_OUTOF10: 9
PAINLEVEL_OUTOF10: 0 - NO PAIN
PAINLEVEL_OUTOF10: 10 - WORST POSSIBLE PAIN
PAINLEVEL_OUTOF10: 8
PAINLEVEL_OUTOF10: 9
PAINLEVEL_OUTOF10: 8
PAINLEVEL_OUTOF10: 10 - WORST POSSIBLE PAIN
PAINLEVEL_OUTOF10: 5 - MODERATE PAIN
PAINLEVEL_OUTOF10: 8
PAINLEVEL_OUTOF10: 4
PAINLEVEL_OUTOF10: 8
PAINLEVEL_OUTOF10: 4
PAINLEVEL_OUTOF10: 5 - MODERATE PAIN
PAINLEVEL_OUTOF10: 6
PAINLEVEL_OUTOF10: 8
PAINLEVEL_OUTOF10: 6
PAINLEVEL_OUTOF10: 0 - NO PAIN
PAINLEVEL_OUTOF10: 8

## 2023-01-01 ASSESSMENT — COGNITIVE AND FUNCTIONAL STATUS - GENERAL
MOBILITY SCORE: 18
DAILY ACTIVITIY SCORE: 21
HELP NEEDED FOR BATHING: A LITTLE
CLIMB 3 TO 5 STEPS WITH RAILING: A LOT
MOVING TO AND FROM BED TO CHAIR: A LITTLE
DAILY ACTIVITIY SCORE: 21
PERSONAL GROOMING: A LITTLE
STANDING UP FROM CHAIR USING ARMS: A LITTLE
MOBILITY SCORE: 21
CLIMB 3 TO 5 STEPS WITH RAILING: A LITTLE
STANDING UP FROM CHAIR USING ARMS: A LITTLE
CLIMB 3 TO 5 STEPS WITH RAILING: A LITTLE
TOILETING: A LITTLE
DAILY ACTIVITIY SCORE: 19
MOBILITY SCORE: 17
MOVING TO AND FROM BED TO CHAIR: A LITTLE
DRESSING REGULAR LOWER BODY CLOTHING: A LITTLE
MOVING FROM LYING ON BACK TO SITTING ON SIDE OF FLAT BED WITH BEDRAILS: A LITTLE
TURNING FROM BACK TO SIDE WHILE IN FLAT BAD: A LITTLE
DRESSING REGULAR UPPER BODY CLOTHING: A LITTLE
DAILY ACTIVITIY SCORE: 24
TURNING FROM BACK TO SIDE WHILE IN FLAT BAD: A LITTLE
MOBILITY SCORE: 18
STANDING UP FROM CHAIR USING ARMS: A LITTLE
TURNING FROM BACK TO SIDE WHILE IN FLAT BAD: A LITTLE
WALKING IN HOSPITAL ROOM: A LITTLE
HELP NEEDED FOR BATHING: A LITTLE
HELP NEEDED FOR BATHING: A LITTLE
STANDING UP FROM CHAIR USING ARMS: A LITTLE
PATIENT BASELINE BEDBOUND: NO
TURNING FROM BACK TO SIDE WHILE IN FLAT BAD: A LITTLE
DRESSING REGULAR LOWER BODY CLOTHING: A LITTLE
TURNING FROM BACK TO SIDE WHILE IN FLAT BAD: A LITTLE
DRESSING REGULAR UPPER BODY CLOTHING: A LITTLE
CLIMB 3 TO 5 STEPS WITH RAILING: A LITTLE
MOBILITY SCORE: 19
DRESSING REGULAR UPPER BODY CLOTHING: A LITTLE
TOILETING: A LITTLE
HELP NEEDED FOR BATHING: A LITTLE
MOVING FROM LYING ON BACK TO SITTING ON SIDE OF FLAT BED WITH BEDRAILS: A LITTLE
TURNING FROM BACK TO SIDE WHILE IN FLAT BAD: A LITTLE
TOILETING: A LITTLE
PERSONAL GROOMING: A LITTLE
MOVING TO AND FROM BED TO CHAIR: A LITTLE
MOVING TO AND FROM BED TO CHAIR: A LITTLE
MOBILITY SCORE: 24
WALKING IN HOSPITAL ROOM: A LITTLE
STANDING UP FROM CHAIR USING ARMS: A LITTLE
DAILY ACTIVITIY SCORE: 19
STANDING UP FROM CHAIR USING ARMS: A LITTLE
HELP NEEDED FOR BATHING: A LITTLE
DRESSING REGULAR LOWER BODY CLOTHING: A LITTLE
MOBILITY SCORE: 18
MOVING TO AND FROM BED TO CHAIR: A LITTLE
CLIMB 3 TO 5 STEPS WITH RAILING: A LOT
CLIMB 3 TO 5 STEPS WITH RAILING: A LOT
MOBILITY SCORE: 19
MOVING TO AND FROM BED TO CHAIR: A LITTLE
WALKING IN HOSPITAL ROOM: A LITTLE
PERSONAL GROOMING: A LITTLE
CLIMB 3 TO 5 STEPS WITH RAILING: A LOT
DAILY ACTIVITIY SCORE: 19
PERSONAL GROOMING: A LITTLE
WALKING IN HOSPITAL ROOM: A LITTLE
DAILY ACTIVITIY SCORE: 24
WALKING IN HOSPITAL ROOM: A LITTLE
DRESSING REGULAR LOWER BODY CLOTHING: A LITTLE
DRESSING REGULAR LOWER BODY CLOTHING: A LITTLE
PERSONAL GROOMING: A LITTLE

## 2023-01-01 ASSESSMENT — PAIN - FUNCTIONAL ASSESSMENT

## 2023-01-01 ASSESSMENT — PATIENT HEALTH QUESTIONNAIRE - PHQ9
SUM OF ALL RESPONSES TO PHQ QUESTIONS 1-9: 8
7. TROUBLE CONCENTRATING ON THINGS, SUCH AS READING THE NEWSPAPER OR WATCHING TELEVISION: MORE THAN HALF THE DAYS
10. IF YOU CHECKED OFF ANY PROBLEMS, HOW DIFFICULT HAVE THESE PROBLEMS MADE IT FOR YOU TO DO YOUR WORK, TAKE CARE OF THINGS AT HOME, OR GET ALONG WITH OTHER PEOPLE: SOMEWHAT DIFFICULT
3. TROUBLE FALLING OR STAYING ASLEEP OR SLEEPING TOO MUCH: MORE THAN HALF THE DAYS
4. FEELING TIRED OR HAVING LITTLE ENERGY: MORE THAN HALF THE DAYS
SUM OF ALL RESPONSES TO PHQ9 QUESTIONS 1 & 2: 1
9. THOUGHTS THAT YOU WOULD BE BETTER OFF DEAD, OR OF HURTING YOURSELF: NOT AT ALL
8. MOVING OR SPEAKING SO SLOWLY THAT OTHER PEOPLE COULD HAVE NOTICED. OR THE OPPOSITE, BEING SO FIGETY OR RESTLESS THAT YOU HAVE BEEN MOVING AROUND A LOT MORE THAN USUAL: NOT AT ALL
1. LITTLE INTEREST OR PLEASURE IN DOING THINGS: NOT AT ALL
5. POOR APPETITE OR OVEREATING: SEVERAL DAYS
6. FEELING BAD ABOUT YOURSELF - OR THAT YOU ARE A FAILURE OR HAVE LET YOURSELF OR YOUR FAMILY DOWN: NOT AT ALL
2. FEELING DOWN, DEPRESSED OR HOPELESS: SEVERAL DAYS

## 2023-01-01 ASSESSMENT — PAIN DESCRIPTION - DESCRIPTORS
DESCRIPTORS: HEAVINESS

## 2023-01-01 ASSESSMENT — ACTIVITIES OF DAILY LIVING (ADL)
FEEDING YOURSELF: INDEPENDENT
HEARING - RIGHT EAR: FUNCTIONAL
DRESSING YOURSELF: INDEPENDENT
TOILETING: INDEPENDENT
PATIENT'S MEMORY ADEQUATE TO SAFELY COMPLETE DAILY ACTIVITIES?: YES
GROOMING: INDEPENDENT
BATHING: INDEPENDENT
ADEQUATE_TO_COMPLETE_ADL: YES
WALKS IN HOME: INDEPENDENT
JUDGMENT_ADEQUATE_SAFELY_COMPLETE_DAILY_ACTIVITIES: YES
LACK_OF_TRANSPORTATION: NO
HEARING - LEFT EAR: FUNCTIONAL
HOME_MANAGEMENT_TIME_ENTRY: 17
EFFECT OF PAIN ON DAILY ACTIVITIES: '

## 2023-01-01 ASSESSMENT — LIFESTYLE VARIABLES
HOW OFTEN DURING THE LAST YEAR HAVE YOU NEEDED AN ALCOHOLIC DRINK FIRST THING IN THE MORNING TO GET YOURSELF GOING AFTER A NIGHT OF HEAVY DRINKING: NEVER
AUDIT TOTAL SCORE: 0
HOW OFTEN DO YOU HAVE 5 OR MORE DRINKS ON ONE OCCASION: NEVER
CURRENT_SMOKER: YES
HAVE YOU OR SOMEONE ELSE BEEN INJURED AS A RESULT OF YOUR DRINKING: NO
HOW OFTEN DURING THE LAST YEAR HAVE YOU BEEN UNABLE TO REMEMBER WHAT HAPPENED THE NIGHT BEFORE BECAUSE YOU HAD BEEN DRINKING: NEVER
HAS A RELATIVE, FRIEND, DOCTOR, OR ANOTHER HEALTH PROFESSIONAL EXPRESSED CONCERN ABOUT YOUR DRINKING OR SUGGESTED YOU CUT DOWN: YES, BUT NOT IN THE LAST YEAR
HOW OFTEN DURING THE LAST YEAR HAVE YOU FOUND THAT YOU WERE NOT ABLE TO STOP DRINKING ONCE YOU HAD STARTED: NEVER
HISTORY_OF_SMOKING: YES
HISTORY_OF_SMOKING: YES
AUDIT TOTAL SCORE: 2
HOW OFTEN DURING THE LAST YEAR HAVE YOU HAD A FEELING OF GUILT OR REMORSE AFTER DRINKING: NEVER
HOW OFTEN DURING THE LAST YEAR HAVE YOU FAILED TO DO WHAT WAS NORMALLY EXPECTED FROM YOU BECAUSE OF DRINKING: NEVER
HOW MANY STANDARD DRINKS CONTAINING ALCOHOL DO YOU HAVE ON A TYPICAL DAY: 1 OR 2 DRINKS
HOW OFTEN DO YOU HAVE A DRINK CONTAINING ALCOHOL: NEVER

## 2023-01-01 ASSESSMENT — ANXIETY QUESTIONNAIRES
3. WORRYING TOO MUCH ABOUT DIFFERENT THINGS: NEARLY EVERY DAY
4. TROUBLE RELAXING: MORE THAN HALF THE DAYS
7. FEELING AFRAID AS IF SOMETHING AWFUL MIGHT HAPPEN: NEARLY EVERY DAY
1. FEELING NERVOUS, ANXIOUS, OR ON EDGE: NEARLY EVERY DAY
5. BEING SO RESTLESS THAT IT IS HARD TO SIT STILL: SEVERAL DAYS
IF YOU CHECKED OFF ANY PROBLEMS ON THIS QUESTIONNAIRE, HOW DIFFICULT HAVE THESE PROBLEMS MADE IT FOR YOU TO DO YOUR WORK, TAKE CARE OF THINGS AT HOME, OR GET ALONG WITH OTHER PEOPLE: SOMEWHAT DIFFICULT
GAD7 TOTAL SCORE: 15
6. BECOMING EASILY ANNOYED OR IRRITABLE: NOT AT ALL
2. NOT BEING ABLE TO STOP OR CONTROL WORRYING: NEARLY EVERY DAY

## 2023-07-07 NOTE — PROGRESS NOTES
Outreach call to patient to support a smooth transition of care from recent admission.  Spoke with patient, reviewed discharge medications, discharge instructions, assessed social needs, and provided education on importance of follow-up appointment with provider. Enrolled patient in [email or web address] for additional support and education through transition period.  Will continue to monitor through transition period.    Engagement  Call Start Time: 1013 (7/7/2023 10:13 AM)    Medications  Medications reviewed with patient/caregiver?: Yes (7/7/2023 10:13 AM)  Does the patient have all medications ordered at discharge?: Yes (7/7/2023 10:13 AM)  Is the patient taking all medications as directed (includes completed medication regime)?: Yes (7/7/2023 10:13 AM)    Appointments  Does the patient have a primary care provider?: Yes (7/7/2023 10:13 AM)  Has the patient kept scheduled appointments due by today?: Yes (7/7/2023 10:13 AM)    Patient Teaching  Does the patient have access to their discharge instructions?: Yes (7/7/2023 10:13 AM)  What is the patient's perception of their health status since discharge?: Improving (7/7/2023 10:13 AM)    Wrap Up  Wrap Up Additional Comments: Patient stated thathe wasdoing good and had no concerns. Patient educated to weight self dialy and to report any concerns to his heart doctor and to follow his heart failure action plan. (7/7/2023 10:13 AM)  Call End Time: 1015 (7/7/2023 10:13 AM)      Renu NEGRETEN RN [facility]

## 2023-07-13 NOTE — PROGRESS NOTES
RN CM made unsuccessful call to patient to assess any ongoing care needs following a recent ER visit. RN CM left message requesting a call back.    Renu LOPEZ RN CCM

## 2023-08-28 NOTE — PROGRESS NOTES
RN CM placed call to patient in attempts to assist with rescheduling cancelled PCP appointment and to assess for barriers to care.  No answer when called; RN LEE left message requesting a call back.    Renu LOPEZ RN CCM

## 2023-09-30 NOTE — H&P
History of Present Illness:   HPI:    Referring/Heart  failure Cardiologist: Lala Beard    HPI: 33 year old male with a past medical history significant for HFrEF, NYHA class IV, Stage D; LVEF 10%, RV dysfunction, moderate to severe TR, NICM diagnosed 2018, due to hydrocarbon intoxication  (abusing inhalants); EOTH abuse/dependence, MSSA, COPD, GERD, suicidal ideation, DM, tachycardia, and DAVONTE presents with chest pain and shortness of breath. Pt describes the chest pain as pressure-like, continuous, 8/10, radiating to the lt arm and back.  Pt states the chest pain had been present since his dischrge from the hospital 10 days back when he was admitted for heart failure. It has increased in severity over the last couple of days and is occasionally a/w sweating, blurry vision, nausea and shortness  of breath. Denies any headache, pedal edema, abdominal pain, fever, chills, or recent change in bowel or urinary habits.    ED Course:    115/87 96% on RA  CBC at baseline  CMP Cr at b/l 1.06, ALT/AST 86/49  Trops 38->40->47    VBG 7.49/39/43/lac 2.1  CTPE: no PE, chronic scaring of the right lung base  Interventions: x1 zofran, x4 dilaudid 1mg IV    He was recently admitted to HFICU for advanced therapies evaluation; turned down by the committee d/t polysubstance abuse history and sent home on palliative dobutamine 5mcg/kg/min. Discharge weight 136kg.     Closing SG #s (9/5): /81 (88), CVP 5, PAP 36/29 (31), PAWP 19, CO/CI 6.0/2.45, SVR 1133, SVO2 69% on Dobutamine 5mcg/kg/min, Entresto 24/26 mg BID, Digoxin 0.25 mg daily.  LHC/RHC 6/20 at [facility] (per pt.) shows no CAD    PMH: Include BMI/weight status (underweight, overweight, obesity, morbidly obese) If pt. has A fib-chronic vs. paroxysmal.  EOTH abuse/dependence, cardiomyopathy, CHF, COPD, GERD, HFrEF, suicidal ideation, DM, tachycardia, DAVONTE    PSH: History of Cardioverter defibrillator insertion, Colonoscopy, Endoscopy,  Pyloromyotomy      Allergies: Bee stings, adhesive tape, bandaids    Social Hx:  patient states cigarette use, past ETOH abuse, illicits    FHx: Father had colon cancer and had a stroke in 2018             Allergies:  ·  NKDA :   ·  Bee  Stings: Anaphylaxis, Swelling/Edema  ·  Tape  - Adhesive, Bandaids, Paper: Rash    Medications Prior to Admission:     DULoxetine 30 mg oral delayed release capsule: 1 cap(s) orally 2 times a day (with meals) for depression and anxiety.   spironolactone 25 mg oral tablet: 1 tab(s) orally once a day  acetaminophen 325 mg oral tablet: 2 tab(s) orally every 4 hours, As needed, Pain - Mild (1-3)  rivaroxaban 20 mg oral tablet: 1 tab(s) orally  - Daily 1800  empagliflozin 10 mg oral tablet: 1 tab(s) orally once a day  ezetimibe 10 mg oral tablet: 1 tab(s) orally once a day  rOPINIRole 2 mg oral tablet: 1 tab(s) orally once (at bedtime)  torsemide 100 mg oral tablet: 1 tab(s) orally once a day (in the morning), As Needed for weight gain more than 2lb in 1 day or5 lb in one week  digoxin 250 mcg (0.25 mg) oral tablet: 1 tab(s) orally once a day  polyethylene glycol 3350 oral powder for reconstitution: 17 gram(s) orally once a day  sennosides-docusate 8.6 mg-50 mg oral tablet: 2 tab(s) orally 2 times a day  cyanocobalamin 1000 mcg oral tablet: 1 tab(s) orally once a day  gabapentin 100 mg oral capsule: 2 cap(s) orally 3 times a day  DOBUTamine 1000 mg/250mL-D5W intravenous solution: 9.6 milliliter(s)/hr by continuous intravenous infusion, 5mcg/kg/hr    Ht: 172.7cm  Med Calc Wt: 138kg  traZODone 150 mg oral tablet: 1 tab(s) orally once a day  hydrOXYzine hydrochloride 50 mg oral tablet: 2 tab(s) orally once a day (at bedtime)  cholecalciferol 25 mcg (1000 intl units) oral tablet: 1 tab(s) orally once a day (in the morning)  ALPRAZolam 2 mg oral tablet: 1 tab(s) orally 3 times a day  QUEtiapine 200 mg oral tablet: 1 tab(s) orally once a day (at bedtime)  magnesium oxide 400 mg oral tablet: 1 tab(s) orally  once a day (in the morning)  sacubitril-valsartan 24 mg-26 mg oral tablet: 1 tab(s) orally 2 times a day.    Objective:   Objective Information:    ·  Objective Information      T   P  R  BP   MAP  SpO2   Value  37  121  31  105/70   81  99%  Date/Time 9/23 17:30 9/24 4:09 9/24 4:09 9/24 4:09  9/24 4:09 9/24 4:09  Range  (37C - 37C )  (108 - 129 )  (15 - 44 )  (105 - 140 )/ (69 - 113 )  (81 - 122 )  (87% - 99% )  Highest temp of 37 C was recorded at 9/23 17:30      Pain reported at 9/24 4:09: 9 = Severe        Date:            Weight/Scale Type:  23-Sep-2023 17:30  140  kg           Physical Exam Narrative:  ·  Physical Exam:    in mild distress, ao x3, diaphoretic, tachypneic  no jvd, anicteric sclerae, no oral ulcers  CTAB, no rales/ crackles  s1/s2, tachycardic, 2/6 systolic murmur, L pleural  subq ICD  soft, nt, distended, no organomegaly, +ve bs  cold and dry extr  5/5 strength in all extrem.     Recent Lab Results:    Results:    CBC: 9/23/2023 18:15              \     Hgb     /                              \     12.8 L    /  WBC  ----------------  Plt               5.3       ----------------    278              /     Hct     \                              /     37.4 L    \            RBC: 4.87     MCV: 77 L    Neutrophil %: 59.1      CMP: 9/23/2023 18:15  NA+        Cl-     BUN  /                         138    101    16  /  --------------------------------  Glucose                ---------------------------  154 H    K+     HCO3-   Creat \                         3.8    28    1.06  \           \  T Bili  /                    \  0.4  /  AST  x ---- x ALT        49 Hx ---- x 86 H         /  Alk P   \               /  80  \  Calcium : 9.7     Anion Gap : 13     Albumin : 4.3     T Protein : 7.7           Coagulation: 9/23/2023 18:15  PT  /                    11.7  /  -------<    INR          ----------<      1.0  PTT\                              \                       Assessment and Plan:    Neurology:  Diagnosis:    33 year old male with  a past medical history significant for HFrEF, NYHA class IV, Stage D; LVEF 10%, RV dysfunction, moderate to severe TR, NICM diagnosed 2018, due to hydrocarbon intoxication (abusing inhalants); EOTH abuse/dependence, MSSA, COPD, GERD, suicidal ideation,  DM, tachycardia, and DAVONTE presents with chest pain and shortness of breath. Symptoms c/w worsening ADHF; given his status and the committee?s decision will prioritize symptom palliation for now.    Neuro:  # ADHD  # Depression with history of suicidal ideations   # chronic pain   - Serial neuro and pain assessments   - PO Tylenol PRN for pain  - c/w home ropinirole 2mg PO at bedtime   - c/w home quetiapine Fumarate 200mg PO at bedtime    - c/w home gabapentin 200 three times a day for general pain  - c/w home alprazolam 2mg PO TID  - c/w home hydroxyzine 100mg PO at bedtime  - PT/OT Consult, OOB to chair  - CAM ICU score every shift  - Sleep/wake cycle normalization    # Physical Status  -Overweight/Obesity/Morbid (severe) Obesity- BMI 48  -Age-related debility/bed confined/Reduced Mobility    #Substance abuse  - History of Alcohol abuse (quit in 2020)  -Tobacco use/Nicotine Dependence (smokes 1-2 cigarettes daily)  - Former cocaine user. (Quit in his 20s)  - Hydrocarbon abuse   - Drug screen test: pending     Cardiovascular:  # NICM/HFrEF  # NYHA Class IV, Stage D systolic heart failure  # S/P ICD  # TR  - Cardiac Tests   EKG: Sinus tachycardia rate of 110bpm   Echo (12/27/21): Left Ventricle: The left ventricular systolic function is severely decreased, with an estimated ejection fraction of 10%.   - ECHO (8/30):  severely decreased LV systolic function, EF 15-20%, LV cavity size severely dilated LVIDd 7.7.  MOd reduced RV systolic function.  LA and RA mild-mod dilated   - Advance therapy work-up done previously , but was declined due to social an substance abuse hx -> PATIENT IS ONLY A CANDIDATE FOR MEDICAL THERAPY    - admit weight   - daily weight   - admit   - spironolactone 25mg PO   - Increase Dobutamine 7.5mcg/kg/min   - furosemide 80mg iv once  - c/w entrston 24/26, empagliflozin 10mg, digoxin 0.25mg  - Daily standing weights, regular diet, 2L fluid restriction, strict I&Os    #CAD   -LHC completed in 2020 and was negative.     # Hyperlipidemia  # Obesity  - BMI 48  - c/w zetia 10mg daily  - Elevated Cholesterol 238, HDL 32.3,  on admission   - No need for Statin therapy as there would be no benefit at this time.      #No history of Arrhythmias  -Initially, ICD placed in 2020. Removed due to infection.  -Subcutaneous ICD in place for primary prevention     #Electrolyte Disturbances  # Hypokalemia  # Hypomagnesemia   - Maintain potassium >4 and Magnesium >2  - PO magnesium 400 mg daily   - Replace electrolytes according to protocol     Pulmonary:   #PE on Xarelto at home  #DAVONTE no CPAP at home   - Hx of PE    - c/w home xarelto   - Monitor and maintain SpO2 > 92%  - CTA negative with PE (9/22) on admission    #DAVONTE no CPAP at home   - Hx of DAVONTE due to obesity and inhaling hydrocarbons   - On CPAP at night  - C/W home CPAP      GI:  #constipation; no BM since 8/28    - Bowel regimen senna BID and miralax daily    :  #No history of CKD  -Baseline BUN/Cr 15/0.9  -Admit BUN/Cr normal  -I/Os  -avoid hypotension and nephrotoxic agents    # Epididymal Cystic lesion  - Hx of Epididymal cystic lesion   - No S&S now   -  Ultrasound resulted: A large complex cystic lesion in the left epididymal head is slightly smaller compared to prior imaging with a new echogenic component without internal vascularity. Recommend follow-up as clinically indicated (3/28/2023)  - Closely monitoring    Heme:  #No history of anemia  - Labs: CBC H&H 12.8  - Iron studies:  80, 366, 22%;  ferritin 239, folate 16.9, B12 340  - no need for IV ferrous sulfate     Endo:  #PMH of T2DM  - hgbA1c 6.2    #Thyroid  -TSH 2.05, Free T4 2.05      ID:  -afebrile, nontoxic, no s/s infx  -trend temps q4h    Feeding/fluids: regular  Thromboprophylaxis: SCDs, rivaroxaban  VAP bundle: n/a  Ulcer prophylaxis: PPI  Glycemic control: n/a  Bowel care: Colace & miralax PRN  Indwelling catheter: None    PT/OT: following     Code Status: Full Code    Family Primary Contact/Next of Kin: Cathy Gallo (mother) [phone]    Dispo: ICU    A. -G. reviewed          Code Status:  ·  Code Status Full Code     Attestation:   Note Completion:  Critical Care Patient I have reviewed and evaluated the most recent data and results, personally examined the patient, and formulated the plan of care as presented above.  This patient  was critically ill and required continued critical care treatment. Teaching and any separately billable procedures are not included in the time calculation.   Billing Provider Critical Care Time 60 minute(s)   Primary Critical Care Issue/Treatment (See Assessment and Plan for greater detail) -- This patient is believed to have significant heart failure requiring careful monitoring of fluid and respiratory status, including intensive treatment with  cardiovascular medications or devices, as indicated. Please see assessment and plan above for greater detail.         Electronic Signatures:  Ole Spain)  (Signed 24-Sep-2023 04:54)   Authored: History of Present Illness, Comorbidities,  Allergies, Medications Prior to Admission, Objective, Assessment and Plan, Note Completion      Last Updated: 24-Sep-2023 04:54 by Ole Spain)

## 2023-09-30 NOTE — PROGRESS NOTES
Service: Heart Failure     Subjective Data:   KAR WHALEY is a 33 year old Male who is Hospital Day # 3.     No major overnight events. Complains of some ongoing chest discomfort that is similar to previous episodes of chest pain. Wearing CPAP this morning without issues.    Objective Data:     Objective Information:      T   P  R  BP   MAP  SpO2   Value  35.9  86  18  97/61   72  98%  Date/Time 9/25 8:00 9/25 6:00 9/25 6:00 9/25 6:00  9/25 6:00 9/25 6:00  Range  (35.6C - 36.6C )  (86 - 129 )  (14 - 35 )  (86 - 140 )/ (38 - 113 )  (54 - 122 )  (94% - 100% )   As of 24-Sep-2023 20:00:00, patient is on 3 L/min of oxygen via CPAP.      Pain reported at 9/25 6:00: sleeping    ---- Intake and Output  -----  Mn/Dy/Year Time  Intake   Output  Net  Sep 25, 2023 6:00 am  70   0  70  Sep 24, 2023 10:00 pm  160   1900  -1740  Sep 24, 2023 2:00 pm  10.5   0  10    The Intake and Output Totals for the last 24 hours are:      Intake   Output  Net      240   1900  -1660      ---- Intake and Output  -----  Mn/Dy/Year Time  Intake   Output  Net  Sep 25, 2023 6:00 am  70   0  70  Sep 24, 2023 10:00 pm  160   1900  -1740  Sep 24, 2023 2:00 pm  10.5   0  10    The Intake and Output Totals for the last 24 hours are:      Intake   Output  Net      240   1900  -1660    Physical Exam Narrative:  ·  Physical Exam:    in mild distress, ao x3, diaphoretic, tachypneic  no jvd, anicteric sclerae, no oral ulcers  CTAB, no rales/ crackles  s1/s2, tachycardic, 2/6 systolic murmur, L pleural  subq ICD  soft, nt, distended, no organomegaly, +ve bs  cold and dry extremities  5/5 strength in all extremities.     Medication:    Medications:          Continuous Medications       --------------------------------    1. Milrinone 20 mg/ D5W 100 mL Premix Infusion:  0.25  mcg/kg/min  IntraVenous  <Continuous>         Scheduled Medications       --------------------------------    1. Cholecalciferol (Vitamin D3):  1000  International  Unit(s)  Oral  Daily    2. Cyanocobalamin:  1000  microgram(s)  Oral  Daily    3. Digoxin:  0.25  mg  Oral  Daily    4. Docusate 50 mg - Senna 8.6 m  tablet(s)  Oral  2 Times a Day    5. DULoxetine:  30  mg  Oral  2 Times a Day    6. Empagliflozin:  10  mg  Oral  Daily    7. Ezetimibe:  10  mg  Oral  Daily    8. Gabapentin:  200  mg  Oral  3 Times a Day    9. hydrOXYzine Hydrochloride (ATARAX):  100  mg  Oral  At Bedtime    10. Magnesium Oxide:  400  mg  Oral  Daily    11. Polyethylene Glycol:  17  gram(s)  Oral  2 Times a Day    12. QUEtiapine:  200  mg  Oral  At Bedtime    13. Rivaroxaban:  20  mg  Oral  Daily 1800    14. rOPINIRole (REQUIP):  2  mg  Oral  At Bedtime    15. Spironolactone:  25  mg  Oral  Daily    16. traZODone:  150  mg  Oral  At Bedtime         PRN Medications       --------------------------------    1. Acetaminophen:  650  mg  Oral  Every 4 Hours    2. ALPRAZolam:  2  mg  Oral  Every 8 Hours    3. guaiFENesin Extended Release:  1200  mg  Oral  Every 12 Hours    4. HYDROmorphone:  2  mg  Oral  Every 4 Hours           Currently Suspended Medications       --------------------------------    1. Sacubitril 24 mg - Valsartan 26 m  tablet(s)  Oral  2 Times a Day      Recent Lab Results:    Results:    CBC: 2023 01:37              \     Hgb     /                              \     11.1 L    /  WBC  ----------------  Plt               4.6       ----------------    256              /     Hct     \                              /     34.7 L    \            RBC: 4.40 L    MCV: 79 L          RFP: 2023 01:37  NA+        Cl-     BUN  /                         136    99    21  /  --------------------------------  Glucose                ---------------------------  131 H    K+     HCO3-   Creat \                         3.7    30    0.99  \  Calcium : 9.3Anion Gap : 11          Albumin : 3.8     Phos : 4.2        I have reviewed these laboratory results:    Blood Gas, Venous   25-Sep-2023 06:52:00      Result Value    pH, Venous  7.33    pCO2, Venous  58   H   pO2, Venous  42    SO2, Venous  60    Bicarbonate, Calculated, Venous  30.6   H   Base Excess, Venous  3.2   H   OXY HGB, Venous  58.5    Patient Temperature, Venous  37.0      Digoxin Level, Serum  25-Sep-2023 01:37:00      Result Value    Digoxin Level, Serum  <0.30   L     Venous Full Panel  24-Sep-2023 16:18:00      Result Value    pH, Venous  7.31   L   pCO2, Venous  59   H   pO2, Venous  47   H   SO2, Venous  73    Bicarbonate, Calculated, Venous  29.7   H   HGB, Venous  12.0   L   Anion Gap Level, Venous  11    Base Excess, Venous  2.2    Calcium, Ionized Venous  1.12    Chloride Level  100    Glucose Level, Venous  191   H   HCT CALCULATED, Venous  36.0   L   Lactate Level, Venous  1.7    OXY HGB, Venous  71.3    Patient Temperature, Venous  37.0    Potassium Level, Venous  3.7    Sodium Level, Venous  137      Lactate, Level  24-Sep-2023 09:10:00      Result Value    Lactate, Level  1.3      Coagulation Screen  24-Sep-2023 04:48:00      Result Value    Prothrombin Time, Plasma  11.4    International Normalized Ratio, Plasma  1.0    Activated Partial Thromboplastin Time  28      Troponin I, High Sensitivity  23-Sep-2023 20:56:00      Result Value    Troponin I, High Sensitivity  47        Radiology Results:    Results:        Conclusion:  Please see ED Provider Note for formal interpretation  Confirmed by Emma Figueroa ([id number]) on 9/24/2023 12:10:23 AM     Electrocardiogram 12 Lead [Sep 24 2023 12:10AM]      Impression:    1. No evidence of acute pulmonary embolism to segmental level. Please  note that, assessment of subsegmental branches is limited and small  peripheral emboli are not entirely excluded.  2. Chronic pleural and subpleural scarring within the  right-greater-than-left lung base without evidence of consolidation  or pneumothorax.  3. Unchanged cardiomegaly with leftventricular dilatation.  4. Left chest wall  AICD as well as partially visualized right central  venous catheter with its tip terminating in the right atrium,  unchanged.      TH CT Angio Chest for PE [Sep 23 2023  9:54PM]      Assessment and Plan:   Daily Risk Screen:  ·  Does patient have a central line? yes   ·  Central Line Type tunneled     Code Status:  ·  Code Status Full Code     Assessment:    33 year old male with a past medical history significant for HFrEF, NYHA class IV, Stage D; LVEF 10%, RV dysfunction, moderate to severe TR, NICM  diagnosed 2018, due to hydrocarbon intoxication (abusing inhalants); EOTH abuse/dependence, MSSA, COPD, GERD, suicidal ideation, DM, tachycardia, and DAVONTE presents with chest pain and shortness of breath. Symptoms c/w worsening ADHF; given his status and  the committee?s decision will prioritize symptom palliation for now. Patients mixed venous sat was 32% from Agrawal catheter , so was transitioned to milrinone and weaned off dobutamine - plan is to keep on milrinone as BP tolerates.    Neuro:  # ADHD  # Depression with history of suicidal ideations   # chronic pain   - Serial neuro and pain assessments   - PO Tylenol PRN for pain  - D/c IV hydrocodone 2 mg IVP Q4 to PO hydrocodone 2 mg Q4 for severe pain   - c/w home ropinirole 2mg PO at bedtime   - c/w home quetiapine Fumarate 200mg PO at bedtime    - c/w home gabapentin 200 three times a day for general pain  - c/w home alprazolam 2mg PO TID  - c/w home hydroxyzine 100mg PO at bedtime  - PT/OT Consult, OOB to chair  - CAM ICU score every shift  - Sleep/wake cycle normalization    # Physical Status  -Overweight/Obesity/Morbid (severe) Obesity- BMI 48  -Age-related debility/bed confined/Reduced Mobility    #Substance abuse  - History of Alcohol abuse (quit in 2020)  -Tobacco use/Nicotine Dependence (smokes 1-2 cigarettes daily)  - Former cocaine user. (Quit in his 20s)  - Hydrocarbon abuse   - Drug screen test: pending     Cardiovascular:  # NICM/HFrEF  # NYHA Class  IV, Stage D systolic heart failure  # S/P ICD  # TR  - Cardiac Tests   EKG: Sinus tachycardia rate of 110bpm   Echo (12/27/21): Left Ventricle: The left ventricular systolic function is severely decreased, with an estimated ejection fraction of 10%.   - ECHO (8/30):  severely decreased LV systolic function, EF 15-20%, LV cavity size severely dilated LVIDd 7.7.  MOd reduced RV systolic function.  LA and RA mild-mod dilated   - Advance therapy work-up done previously , but was declined due to social an substance abuse hx -> PATIENT IS ONLY A CANDIDATE FOR MEDICAL THERAPY  - Chronic MSK chest pain is leading to many ED visits/admissions; improves with opioids.  - Will have palliative care see today as well for improved pain mgmt regimen and palliative inotrope discussions.    - admit weight 140 kg  - daily weight 140 kg   - admit   - spironolactone 25mg PO   - Continue Milrinone 0.250  mcg/kg/min (patient previously had VT with milrinone)   - Recheck mixed venous sat from [facility] ~ every 6-8 hours   - HOLD entresto 24/26  - C/w empagliflozin 10mg, aleida 25 mg, digoxin 0.25mg  - Daily standing weights, regular diet, 2L fluid restriction, strict I&Os    #CAD   -LHC completed in 2020 and was negative.     # Hyperlipidemia  # Obesity  - BMI 48  - c/w zetia 10mg daily  - Elevated Cholesterol 238, HDL 32.3,  on admission   - No need for Statin therapy as there would be no benefit at this time.      #No history of Arrhythmias  -Initially, ICD placed in 2020. Removed due to infection.  -Subcutaneous ICD in place for primary prevention     #Electrolyte Disturbances  # Hypokalemia  # Hypomagnesemia   - Maintain potassium >4 and Magnesium >2  - PO magnesium 400 mg daily   - Replace electrolytes according to protocol     Pulmonary:   #PE on Xarelto at home  #DAVONTE no CPAP at home   - Hx of PE    - Resume home Xarelto; no plan for invasive hemodynamic monitoring.  - Monitor and maintain SpO2 > 92%  - CTA negative with  PE (9/22) on admission    #DAVONTE no CPAP at home   - Hx of DAVONTE due to obesity and inhaling hydrocarbons   - On CPAP at night  - C/W home CPAP [id number]     GI:  #constipation; no BM since 8/28    - Bowel regimen senna BID and miralax daily    :  #No history of CKD  -Baseline BUN/Cr 15/0.9  -Admit BUN/Cr normal  -I/Os  -avoid hypotension and nephrotoxic agents    # Epididymal Cystic lesion  - Hx of Epididymal cystic lesion   - No S&S now   -  Ultrasound resulted: A large complex cystic lesion in the left epididymal head is slightly smaller compared to prior imaging with a new echogenic component without internal vascularity. Recommend follow-up as clinically indicated (3/28/2023)  - Closely monitoring    Heme:  #No history of anemia  - Labs: CBC H&H 11.1  - Iron studies:  80, 366, 22%;  ferritin 239, folate 16.9, B12 340  - no need for IV ferrous sulfate     Endo:  #PMH of T2DM  - hgbA1c 6.2    #Thyroid  -TSH 2.05, Free T4 2.05     ID:  -afebrile, nontoxic, no s/s infx  -trend temps q4h    Feeding/fluids: regular  Thromboprophylaxis: SCDs, rivaroxaban (on hold, discuss heparin gtt vs xarelto vs prophylaxis)  VAP bundle: n/a  Ulcer prophylaxis: PPI  Glycemic control: n/a  Bowel care: Colace & miralax PRN  Indwelling catheter: None    PT/OT: following     Code Status: Full Code    Family Primary Contact/Next of Kin: Cathy Gallo (mother) [phone]    Dispo: HFICU, possibly D/C home tomorrow    A. -G. reviewed     Discussed with HF attending, Dr. Jean      Attestation:   Note Completion:  I am a:  Resident/Fellow   Attending Attestation I saw and evaluated the patient.  I personally obtained the key and critical portions of the history and physical exam or was physically present for key and  critical portions performed by the resident/fellow. I reviewed the resident/fellow?s documentation and discussed the patient with the resident/fellow.  I agree with the resident/fellow?s medical decision making as  documented in the note.     I personally evaluated the patient on 25-Sep-2023   Comments/ Additional Findings    33M followed by my colleague Dr. Reeves with  stage D NICM/HFrEF not advanced therapies candidate due to smoking, substance use, suicidality, obesity who was recently initiated on dobutamine now presenting with reproducible chest wall tenderness (chronic) that has worsened. Stable end-organ function  but we have transitioned to milrinone as possible ability to lower PA pressures that have been elevated on prior RHC.     - pain consult  - Palliative medicine consult appreciated re: palliative inotrope therapy. Discussed implications with him and his family. He is agreeable if medically needed.   - Case management.       This critically ill patient continues to be at-risk for clinically significant deterioration / failure due to the above mentioned dysfunctional, unstable organ systems.  I have personally identified and managed all complex critical care issues to prevent  aforementioned clinical deterioration.  Critical care time is spent at bedside and/or the immediate area and has included, but is not limited to, the review of diagnostic tests, labs, radiographs, serial assessments of hemodynamics, respiratory status,  ventilatory management, and family updates.  Time spent in procedures and teaching are reported separately.    Critical care time: __35__ minutes             Electronic Signatures:  Isai Jean)  (Signed 25-Sep-2023 15:40)   Authored: Note Completion   Co-Signer: Assessment and Plan  Hyacinth Riley (DO (Fellow))  (Signed 25-Sep-2023 11:20)   Authored: Service, Subjective Data, Objective Data, Assessment  and Plan, Note Completion      Last Updated: 25-Sep-2023 15:40 by Isai Jean)

## 2023-09-30 NOTE — PROGRESS NOTES
Service: Heart Failure     Subjective Data:   KAR WHALEY is a 33 year old Male who is Hospital Day # 4.     No major overnight events. Continues to have similar chest discomfort overnight, no new complaints otherwise. D/w SW today, will be able to hook up home inotrope today but may be tomorrow due to transportation/infusion  time conflict with this mother.    Objective Data:     Objective Information:      T   P  R  BP   MAP  SpO2   Value  36.1  112  23  109/76   87  93%  Date/Time  7:53  9:00  9:00  9: 9: 9:00  Range  (35.6C - 36.6C )  (86 - 115 )  (13 - 27 )  (85 - 133 )/ (51 - 93 )  (66 - 105 )  (83% - 100% )      Pain reported at  8:00: 9 = Severe    ---- Intake and Output  -----  Mn/Dy/Year Time  Intake   Output  Net  Sep 26, 2023 6:00 am  80   0  80  Sep 25, 2023 10:00 pm  30   0  30  Sep 25, 2023 2:00 pm  10   0  10    The Intake and Output Totals for the last 24 hours are:      Intake   Output  Net      120   null  null    Physical Exam Narrative:  ·  Physical Exam:    in mild distress, ao x3, diaphoretic, tachypneic  no jvd, anicteric sclerae, no oral ulcers  CTAB, no rales/ crackles  s1/s2, tachycardic, 2/6 systolic murmur, L pleural  subq ICD  soft, mildly tender diffusely, distended, no organomegaly, +ve bs  cold and dry extremities  5/5 strength in all extremities.     Medication:    Medications:          Continuous Medications       --------------------------------    1. Milrinone 20 mg/ D5W 100 mL Premix Infusion:  0.25  mcg/kg/min  IntraVenous  <Continuous>         Scheduled Medications       --------------------------------    1. Cholecalciferol (Vitamin D3):  1000  International Unit(s)  Oral  Daily    2. Cyanocobalamin:  1000  microgram(s)  Oral  Daily    3. Digoxin:  0.25  mg  Oral  Daily    4. Docusate 50 mg - Senna 8.6 m  tablet(s)  Oral  2 Times a Day    5. DULoxetine:  30  mg  Oral  2 Times a Day    6. Empagliflozin:  10  mg  Oral   Daily    7. Ezetimibe:  10  mg  Oral  Daily    8. Gabapentin:  200  mg  Oral  3 Times a Day    9. hydrOXYzine Hydrochloride (ATARAX):  100  mg  Oral  At Bedtime    10. Magnesium Oxide:  400  mg  Oral  Daily    11. Polyethylene Glycol:  17  gram(s)  Oral  2 Times a Day    12. QUEtiapine:  200  mg  Oral  At Bedtime    13. Rivaroxaban:  20  mg  Oral  Daily 1800    14. rOPINIRole (REQUIP):  2  mg  Oral  At Bedtime    15. Sacubitril 24 mg - Valsartan 26 m  tablet(s)  Oral  2 Times a Day    16. Spironolactone:  25  mg  Oral  Daily    17. traZODone:  150  mg  Oral  At Bedtime         PRN Medications       --------------------------------    1. Acetaminophen:  650  mg  Oral  Every 4 Hours    2. ALPRAZolam:  2  mg  Oral  Every 8 Hours    3. Artificial Tears (Preservative Free):  2  drop(s)  Both Eyes  Every 4 Hours    4. guaiFENesin Extended Release:  1200  mg  Oral  Every 12 Hours    5. HYDROmorphone:  2  mg  Oral  Every 4 Hours        Recent Lab Results:    Results:    CBC: 2023 03:30              \     Hgb     /                              \     12.1 L    /  WBC  ----------------  Plt               5.4       ----------------    288              /     Hct     \                              /     35.7 L    \            RBC: 4.59     MCV: 78 L          RFP: 2023 03:30  NA+        Cl-     BUN  /                         140    102    17  /  --------------------------------  Glucose                ---------------------------  158 H    K+     HCO3-   Creat \                         4.0    29    0.96  \  Calcium : 9.7Anion Gap : 13          Albumin : 4.1     Phos : 4.2        I have reviewed these laboratory results:    Magnesium, Serum  26-Sep-2023 03:30:00      Result Value    Magnesium, Serum  2.32      Mixed Venous Full Panel  25-Sep-2023 18:36:00      Result Value    pH, Mixed Venous  7.36    pCO2, Mixed Venous  52    pO2, Mixed Venous  41    Patient Temperature, Mixed Venous  37.0    SO2, Mixed  Venous  63    Oxy HGB, Mixed Venous  61.9    HCT Calculated, Mixed Venous  37.0   L   Sodium, Mixed Venous  134   L   Potassium, Mixed Venous  4.2    Chloride, Mixed Venous  102    Calcium Ionized, Mixed Venous  1.20    Glucose, Mixed Venous  138   H   Lactate, Mixed Venous  0.6    Base Excess-Blood, Mixed Venous  3.0    Bicarbonate, Calculated, Mixed Venous  29.4    HGB, Mixed Venous  12.2   L   Anion Gap-Level, Mixed Venous  7   L     Digoxin Level, Serum  25-Sep-2023 01:37:00      Result Value    Digoxin Level, Serum  <0.30   L       Radiology Results:    Results:        Conclusion:  Please see ED Provider Note for formal interpretation  Confirmed by Emma Figueroa ([id number]) on 9/24/2023 12:10:23 AM     Electrocardiogram 12 Lead [Sep 24 2023 12:10AM]      Impression:    1. No evidence of acute pulmonary embolism to segmental level. Please  note that, assessment of subsegmental branches is limited and small  peripheral emboli are not entirely excluded.  2. Chronic pleural and subpleural scarring within the  right-greater-than-left lung base without evidence of consolidation  or pneumothorax.  3. Unchanged cardiomegaly with leftventricular dilatation.  4. Left chest wall AICD as well as partially visualized right central  venous catheter with its tip terminating in the right atrium,  unchanged.      TH CT Angio Chest for PE [Sep 23 2023  9:54PM]      Assessment and Plan:   Daily Risk Screen:  ·  Does patient have a central line? yes   ·  Central Line Type tunneled     Code Status:  ·  Code Status Full Code     Assessment:    33 year old male with a past medical history significant for HFrEF, NYHA class IV, Stage D; LVEF 10%, RV dysfunction, moderate to severe TR, NICM  diagnosed 2018, due to hydrocarbon intoxication (abusing inhalants); EOTH abuse/dependence, MSSA, COPD, GERD, suicidal ideation, DM, tachycardia, and ADVONTE presents with chest pain and shortness of breath. Symptoms c/w worsening ADHF; given his status  and  the committee?s decision will prioritize symptom palliation for now. Patients mixed venous sat was 32% from Agrawal catheter , so was transitioned to milrinone and weaned off dobutamine - plan is to keep on milrinone from now on as BP tolerates.    Neuro:  # ADHD  # Depression with history of suicidal ideations   # chronic pain   - Serial neuro and pain assessments   - PO Tylenol PRN for pain  - D/c IV hydrocodone 2 mg IVP Q4 to PO hydrocodone 2 mg Q4 for severe pain   - c/w home ropinirole 2mg PO at bedtime   - c/w home quetiapine Fumarate 200mg PO at bedtime    - c/w home gabapentin 200 three times a day for general pain  - c/w home alprazolam 2mg PO TID  - c/w home hydroxyzine 100mg PO at bedtime  - PT/OT Consult, OOB to chair  - CAM ICU score every shift  - Sleep/wake cycle normalization    # Physical Status  -Overweight/Obesity/Morbid (severe) Obesity- BMI 48  -Age-related debility/bed confined/Reduced Mobility    #Substance abuse  - History of Alcohol abuse (quit in 2020)  -Tobacco use/Nicotine Dependence (smokes 1-2 cigarettes daily)  - Former cocaine user. (Quit in his 20s)  - Hydrocarbon abuse   - Drug screen test: pending     Cardiovascular:  # NICM/HFrEF  # NYHA Class IV, Stage D systolic heart failure  # S/P ICD  # TR  - Cardiac Tests   EKG: Sinus tachycardia rate of 110bpm   Echo (12/27/21): Left Ventricle: The left ventricular systolic function is severely decreased, with an estimated ejection fraction of 10%.   - ECHO (8/30):  severely decreased LV systolic function, EF 15-20%, LV cavity size severely dilated LVIDd 7.7.  Mod reduced RV systolic  function.  LA and RA mild-mod dilated   - Advance therapy work-up done previously , but was declined due to social an substance abuse hx -> PATIENT IS ONLY A CANDIDATE FOR MEDICAL THERAPY  - Chronic MSK chest pain is leading to many ED visits/admissions; improves with opioids.  - Palliative to continue to see while inpatient. Will need to make a pain  regimen going home, preferably without diluadid.      - Will need chronic pain mgmt referral on an outpatient basis.  - admit weight 140 kg  - obtain daily weight.  - admit   - spironolactone 25mg PO   - Continue Milrinone 0.250  mcg/kg/min (patient previously had VT with milrinone) - continue milrinone on discharge.  - Recheck mixed venous sat from [facility] ~ every 6-8 hours   - Resume entresto 24/26 today, BP has been tolerating well on milrinone so far.   - C/w empagliflozin 10mg, aleida 25 mg, digoxin 0.25mg  - Daily standing weights, regular diet, 2L fluid restriction, strict I&Os    #CAD   -LHC completed in 2020 and was negative.     # Hyperlipidemia  # Obesity  - BMI 48  - c/w zetia 10mg daily  - Elevated Cholesterol 238, HDL 32.3,  on admission   - No need for Statin therapy as there would be no benefit at this time.      #No history of Arrhythmias  -Initially, ICD placed in 2020. Removed due to infection.  -Subcutaneous ICD in place for primary prevention     #Electrolyte Disturbances  # Hypokalemia  # Hypomagnesemia   - Maintain potassium >4 and Magnesium >2  - PO magnesium 400 mg daily   - Replace electrolytes according to protocol     Pulmonary:   #PE on Xarelto at home  #DAVONTE no CPAP at home   - Hx of PE    - Resume home Xarelto; no plan for invasive hemodynamic monitoring.  - Monitor and maintain SpO2 > 92%  - CTA negative with PE (9/22) on admission    #DAVONTE no CPAP at home   - Hx of DAVONTE due to obesity and inhaling hydrocarbons   - On CPAP at night  - C/W home CPAP [id number]     GI:  #constipation; no BM since 8/28    - Bowel regimen senna BID and miralax daily    :  #No history of CKD  -Baseline BUN/Cr 15/0.9  -Admit BUN/Cr normal  -I/Os  -avoid hypotension and nephrotoxic agents    # Epididymal Cystic lesion  - Hx of Epididymal cystic lesion   - No S&S now   -  Ultrasound resulted: A large complex cystic lesion in the left epididymal head is slightly smaller compared to prior imaging with  a new echogenic component without internal vascularity. Recommend follow-up as clinically indicated (3/28/2023)  - Closely monitoring    Heme:  #No history of anemia  - Labs: CBC H&H 12.1/35.7  - Iron studies:  80, 366, 22%;  ferritin 239, folate 16.9, B12 340  - no need for IV ferrous sulfate     Endo:  #PMH of T2DM  - hgbA1c 6.2    #Thyroid  -TSH 2.05, Free T4 2.05     ID:  -afebrile, nontoxic, no s/s infx  -trend temps q4h    Feeding/fluids: regular  Thromboprophylaxis: SCDs, rivaroxaban (on hold, discuss heparin gtt vs xarelto vs prophylaxis)  VAP bundle: n/a  Ulcer prophylaxis: PPI  Glycemic control: n/a  Bowel care: Colace & miralax PRN  Indwelling catheter: None    PT/OT: following     Code Status: Full Code    Family Primary Contact/Next of Kin: Cathy Gallo (mother) [phone]    Dispo: Likely d/c home tomorrow (conflict with his mother's infusion schedule for transport/entry into home today). SW working on home inotrope set up for tomorrow.     OCHOA COTA. reviewed     Discussed with HF attending, Dr. Jean      Attestation:   Note Completion:  I am a:  Resident/Fellow   Attending Attestation I saw and evaluated the patient.  I personally obtained the key and critical portions of the history and physical exam or was physically present for key and  critical portions performed by the resident/fellow. I reviewed the resident/fellow?s documentation and discussed the patient with the resident/fellow.  I agree with the resident/fellow?s medical decision making as documented in the note.     I personally evaluated the patient on 26-Sep-2023   Comments/ Additional Findings    33M followed by my colleague Dr. Reeves with  stage D NICM/HFrEF not advanced therapies candidate due to smoking, substance use, suicidality, obesity who was recently initiated on dobutamine now presenting with reproducible chest wall tenderness (chronic) that has worsened. Stable end-organ function  but we have transitioned to  milrinone as possible ability to lower PA pressures that have been elevated on prior RHC.     - pain consult as outpatient  - Case management for discahrge planning      This critically ill patient continues to be at-risk for clinically significant deterioration / failure due to the above mentioned dysfunctional, unstable organ systems.  I have personally identified and managed all complex critical care issues to prevent  aforementioned clinical deterioration.  Critical care time is spent at bedside and/or the immediate area and has included, but is not limited to, the review of diagnostic tests, labs, radiographs, serial assessments of hemodynamics, respiratory status,  ventilatory management, and family updates.  Time spent in procedures and teaching are reported separately.    Critical care time: __35__ minutes               Electronic Signatures:  Isai Jean)  (Signed 26-Sep-2023 14:17)   Authored: Note Completion   Co-Signer: Service, Subjective Data, Objective Data, Assessment and Plan, Note Completion  Hyacinth Riley (DO (Fellow))  (Signed 26-Sep-2023 10:23)   Authored: Service, Subjective Data, Objective Data, Assessment  and Plan, Note Completion      Last Updated: 26-Sep-2023 14:17 by Isai Jean)

## 2023-09-30 NOTE — PROGRESS NOTES
Service: Heart Failure     Subjective Data:   KAR WHALEY is a 33 year old Male who is Hospital Day # 5.     No major overnight events. Had some difficulty sleeping due to chest pain, and trouble going back to sleep. Plan to discharge home today with milrinone infusion.    Objective Data:     Objective Information:      T   P  R  BP   MAP  SpO2   Value  35.6  97  31  103/64   77  100%  Date/Time 9/27 8:00 9/27 8:00 9/27 8:00 9/27 8:00  9/27 8:00 9/27 8:00  Range  (35C - 36.6C )  (91 - 121 )  (12 - 35 )  (87 - 132 )/ (63 - 98 )  (74 - 106 )  (89% - 100% )      Pain reported at 9/27 8:00: 5 = Moderate    ---- Intake and Output  -----  Mn/Dy/Year Time  Intake   Output  Net  Sep 27, 2023 6:00 am  80   1700  -1620  Sep 26, 2023 10:00 pm  660   0  660  Sep 26, 2023 2:00 pm  630   0  630    The Intake and Output Totals for the last 24 hours are:      Intake   Output  Net      1370   1700  -330    Physical Exam Narrative:  ·  Physical Exam:    no distress, ao x3  no jvd, anicteric sclerae, no oral ulcers  CTAB, no rales/ crackles  s1/s2, tachycardic, 2/6 systolic murmur, L pleural  subq ICD  soft, mildly tender diffusely, distended, no organomegaly, +ve bs  warm and dry extremities  5/5 strength in all extremities.     Recent Lab Results:    Results:    CBC: 9/27/2023 04:13              \     Hgb     /                              \     10.7 L    /  WBC  ----------------  Plt               4.9       ----------------    268              /     Hct     \                              /     33.4 L    \            RBC: 4.24 L    MCV: 79 L    Neutrophil  %: 61.4      RFP: 9/27/2023 04:13  NA+        Cl-     BUN  /                         140    105    15  /  --------------------------------  Glucose                ---------------------------  151 H    K+     HCO3-   Creat \                         4.4    28    0.83  \  Calcium : 9.3Anion Gap : 11          Albumin : 3.6     Phos : 4.6      Radiology  Results:    Results:        Conclusion:  Please see ED Provider Note for formal interpretation  Confirmed by Emma Figueroa ([id number]) on 9/24/2023 12:10:23 AM     Electrocardiogram 12 Lead [Sep 24 2023 12:10AM]      Impression:    1. No evidence of acute pulmonary embolism to segmental level. Please  note that, assessment of subsegmental branches is limited and small  peripheral emboli are not entirely excluded.  2. Chronic pleural and subpleural scarring within the  right-greater-than-left lung base without evidence of consolidation  or pneumothorax.  3. Unchanged cardiomegaly with leftventricular dilatation.  4. Left chest wall AICD as well as partially visualized right central  venous catheter with its tip terminating in the right atrium,  unchanged.      TH CT Angio Chest for PE [Sep 23 2023  9:54PM]      Assessment and Plan:   Daily Risk Screen:  ·  Does patient have a central line? yes   ·  Central Line Type tunneled     Code Status:  ·  Code Status Full Code     Assessment:    33 year old male with a past medical history significant for HFrEF, NYHA class IV, Stage D; LVEF 10%, RV dysfunction, moderate to severe TR, NICM  diagnosed 2018, due to hydrocarbon intoxication (abusing inhalants); EOTH abuse/dependence, MSSA, COPD, GERD, suicidal ideation, DM, tachycardia, and DAVONTE presents with chest pain and shortness of breath. Symptoms c/w worsening ADHF; given his status and  the committee?s decision will prioritize symptom palliation for now. Patients mixed venous sat was 32% from Agrawal catheter , so was transitioned to milrinone and weaned off dobutamine - plan is to keep on milrinone from now on as BP tolerates.    Neuro:  # ADHD  # Depression with history of suicidal ideations   # chronic pain   - Serial neuro and pain assessments   - PO Tylenol PRN for pain  - D/c IV hydrocodone 2 mg IVP Q4 to PO hydrocodone 2 mg Q4 for severe pain   - c/w home ropinirole 2mg PO at bedtime   - c/w home quetiapine Fumarate  200mg PO at bedtime    - c/w home gabapentin 200 three times a day for general pain  - c/w home alprazolam 2mg PO TID  - c/w home hydroxyzine 100mg PO at bedtime  - PT/OT Consult, OOB to chair  - CAM ICU score every shift  - Sleep/wake cycle normalization    # Physical Status  -Overweight/Obesity/Morbid (severe) Obesity- BMI 48  -Age-related debility/bed confined/Reduced Mobility    #Substance abuse  - History of Alcohol abuse (quit in 2020)  -Tobacco use/Nicotine Dependence (smokes 1-2 cigarettes daily)  - Former cocaine user. (Quit in his 20s)  - Hydrocarbon abuse   - Drug screen test: pending     Cardiovascular:  # NICM/HFrEF  # NYHA Class IV, Stage D systolic heart failure  # S/P ICD  # TR  - Cardiac Tests   EKG: Sinus tachycardia rate of 110bpm   Echo (12/27/21): Left Ventricle: The left ventricular systolic function is severely decreased, with an estimated ejection fraction of 10%.   - ECHO (8/30):  severely decreased LV systolic function, EF 15-20%, LV cavity size severely dilated LVIDd 7.7.  Mod reduced RV systolic  function.  LA and RA mild-mod dilated   - Advance therapy work-up done previously , but was declined due to social an substance abuse hx -> PATIENT IS ONLY A CANDIDATE FOR MEDICAL THERAPY  - Chronic MSK chest pain is leading to many ED visits/admissions; improves with opioids.  - Palliative to continue to see while inpatient. Plan to d/c on current pain regimen.     - Will need chronic pain mgmt referral on an outpatient basis.  - admit weight 140 kg  - obtain daily weight.  - admit   - spironolactone 25mg PO   - Continue Milrinone 0.250  mcg/kg/min (patient previously had VT with milrinone) - continue milrinone on discharge.  - Continue entresto 24/26 today, BP has been tolerating well on milrinone so far.   - C/w empagliflozin 10mg, aleida 25 mg, digoxin 0.25mg  - Daily standing weights, regular diet, 2L fluid restriction, strict I&Os    #CAD   -LHC completed in 2020 and was negative.      # Hyperlipidemia  # Obesity  - BMI 48  - c/w zetia 10mg daily  - Elevated Cholesterol 238, HDL 32.3,  on admission   - No need for Statin therapy as there would be no benefit at this time.      #No history of Arrhythmias  -Initially, ICD placed in 2020. Removed due to infection.  -Subcutaneous ICD in place for primary prevention     #Electrolyte Disturbances  # Hypokalemia  # Hypomagnesemia   - Maintain potassium >4 and Magnesium >2  - PO magnesium 400 mg daily   - Replace electrolytes according to protocol     Pulmonary:   #PE on Xarelto at home  #DAVONTE no CPAP at home   - Hx of PE    - Resume home Xarelto; no plan for invasive hemodynamic monitoring.  - Monitor and maintain SpO2 > 92%  - CTA negative with PE (9/22) on admission    #DAVONTE no CPAP at home   - Hx of DAVONTE due to obesity and inhaling hydrocarbons   - On CPAP at night  - C/W home CPAP [id number]     GI:  #constipation; no BM since 8/28    - Bowel regimen senna BID and miralax daily    :  #No history of CKD  -Baseline BUN/Cr 15/0.9  -Admit BUN/Cr normal  -I/Os  -avoid hypotension and nephrotoxic agents    # Epididymal Cystic lesion  - Hx of Epididymal cystic lesion   - No S&S now   -  Ultrasound resulted: A large complex cystic lesion in the left epididymal head is slightly smaller compared to prior imaging with a new echogenic component without internal vascularity. Recommend follow-up as clinically indicated (3/28/2023)  - Closely monitoring    Heme:  #No history of anemia  - Labs: CBC H&H 12.1/35.7  - Iron studies:  80, 366, 22%;  ferritin 239, folate 16.9, B12 340  - no need for IV ferrous sulfate     Endo:  #PMH of T2DM  - hgbA1c 6.2    #Thyroid  -TSH 2.05, Free T4 2.05     ID:  -afebrile, nontoxic, no s/s infx  -trend temps q4h    Feeding/fluids: regular  Thromboprophylaxis: SCDs, rivaroxaban  VAP bundle: n/a  Ulcer prophylaxis: PPI  Glycemic control: n/a  Bowel care: Colace & miralax PRN  Indwelling catheter: None    PT/OT: following     Code  Status: Full Code    Family Primary Contact/Next of Kin: Cathy Gallo (mother) [phone]    Dispo: Likely d/c home tomorrow (conflict with his mother's infusion schedule for transport/entry into home today). SW working on home inotrope set up for tomorrow.     OCHOA HuertaG. reviewed     Discussed with HF attending, Dr. Jean      Attestation:   Note Completion:  I am a:  Resident/Fellow   Attending Attestation I saw and evaluated the patient.  I personally obtained the key and critical portions of the history and physical exam or was physically present for key and  critical portions performed by the resident/fellow. I reviewed the resident/fellow?s documentation and discussed the patient with the resident/fellow.  I agree with the resident/fellow?s medical decision making as documented in the note.     I personally evaluated the patient on 27-Sep-2023         Electronic Signatures:  Isai Jean)  (Signed 28-Sep-2023 07:52)   Authored: Note Completion   Co-Signer: Objective Data, Assessment and Plan, Note Completion  Hyacinth Riley (DO (Fellow))  (Signed 27-Sep-2023 11:19)   Authored: Service, Subjective Data, Objective Data, Assessment  and Plan, Note Completion      Last Updated: 28-Sep-2023 07:52 by Isai Jean)

## 2023-09-30 NOTE — DISCHARGE SUMMARY
Send Summary:   Discharge Summary Providers:  Provider Role Provider Name   · Referring Correct Info, Needed   · Consulting Palliative Care   · Attending Isai Jean   · Primary Pedro Ross   · Primary Vasu Alfaro       Note Recipients: Correct Info, Needed, Vasu Arthur MD - [phone] [Preferred]  Isai Jean MD       Discharge:    Summary:   Admission Date: .23-Sep-2023 17:28:00   Discharge Date: 27-Sep-2023   Attending Physician at Discharge: Hyacinth Riley   Admission Reason: Chest pain and SOB   Final Discharge Diagnoses: Chronic HFrEF (LVEF 20-25%)  Chronic MSK chest pain   Inotrope dependence   Procedures: none   Condition at Discharge: Satisfactory   Disposition at Discharge: .Home   Vital Signs:        T   P  R  BP   MAP  SpO2   Value  35.6  97  31  103/64   77  100%  Date/Time 9/27 8:00 9/27 8:00 9/27 8:00 9/27 8:00  9/27 8:00 9/27 8:00  Range  (35C - 36.6C )  (91 - 121 )  (12 - 35 )  (87 - 132 )/ (63 - 98 )  (74 - 106 )  (89% - 100% )    Date:            Weight/Scale Type:  Height:   27-Sep-2023 05:00  144  kg / standing       Physical Exam:    no distress, ao x3  no jvd, anicteric sclerae, no oral ulcers  CTAB, no rales/ crackles  s1/s2, tachycardic, 2/6 systolic murmur, L subq ICD  soft, mildly tender diffusely, distended, no organomegaly, +ve bs  warm and dry extremities  5/5 strength in all extremities.   Hospital Course:    33 year old male with a past medical history significant for HFrEF, NYHA class IV, Stage D; LVEF 10%, RV dysfunction, moderate to severe TR, NICM diagnosed 2018,  due to hydrocarbon intoxication (abusing inhalants); ETOH abuse/dependence, MSSA, COPD, GERD, suicidal ideation, DM, tachycardia, and DAVONTE presents with chest pain and shortness of breath. Symptoms c/w worsening ADHF; given his status and the committee ?s decision will prioritize symptom palliation for now. Patients mixed venous sat was 32% from Agrawal  catheter, so was transitioned to milrinone and weaned off dobutamine. His PA sat improved to mid 60s. Pt has tolerated the wean from dobutamine to milrinone  well. Discharged home with IV milrinone therapy with close follow up on discharge with outpatient chronic pain referral for reproducible chest wall tenderness that is chronic in nature. Discharge weight 144kg.     Immunizations:    Immunizations:  07-Jul-2022   Tdap: Immunizations, 07-Jul-2022  24-May-2023   Tdap: Immunizations, 24-May-2023      Discharge Information:    and Continuing Care:   Lab Results - Pending:    Blood Gas, Venous Drawn at 25-Sep-2023 14:04:00  Radiology Results - Pending: None   Discharge Instructions:    Activity:           activity as tolerated.          Balance activity with rest, gradually increase your activity as tolerated          Exercise as prescribed by your physician    Left Ventricular Ejection Fraction:           Left Ventricular Ejection Fraction %:   15-20%    Nutrition/Diet:           resume normal diet    Home Care Certification:           Home Care Agency:    [facility] [phone]          Skilled Disciplines Ordered:   RN/LPN    Home Care Services:           Home Care Skilled Service:   assessment,  infusions/injectables/medications,  IV line care          Injectable/Infusion/Medication (Name only):   Milrinone    Follow Up Appointments:    Follow-Up Appointment 01:           Physician/Dept/Service:   [facility]          Reason for Referral:   Follow up visit          Scheduled Date/Time:   17-Oct-2023 16:00          Location:   [facility]          Phone Number:   [phone]    Discharge Medications: Home Medication   magnesium oxide 400 mg oral tablet - 1 tab(s) orally once a day (in the morning)  QUEtiapine 200 mg oral tablet - 1 tab(s) orally once a day (at bedtime)  DULoxetine 30 mg oral delayed release capsule - 1 cap(s) orally 2 times a day (with meals) for depression and anxiety.    ALPRAZolam 2 mg oral tablet - 1 tab(s) orally 3 times a day  cholecalciferol 25 mcg (1000 intl units) oral tablet - 1 tab(s) orally once a day (in the morning)  hydrOXYzine hydrochloride 50 mg oral tablet - 2 tab(s) orally once a day (at bedtime)  spironolactone 25 mg oral tablet - 1 tab(s) orally once a day  digoxin 250 mcg (0.25 mg) oral tablet - 1 tab(s) orally once a day  polyethylene glycol 3350 oral powder for reconstitution - 17 gram(s) orally once a day  sennosides-docusate 8.6 mg-50 mg oral tablet - 2 tab(s) orally 2 times a day  cyanocobalamin 1000 mcg oral tablet - 1 tab(s) orally once a day  gabapentin 100 mg oral capsule - 2 cap(s) orally 3 times a day  traZODone 150 mg oral tablet - 1 tab(s) orally once a day  milrinone 200 mcg/mL-D5% intravenous solution - 0.25 mcg/kg/min intravenous continuously  oxyCODONE 5 mg oral capsule - 1 cap(s) orally every 8 hours   naloxone 4 mg/0.1 mL nasal spray - 4 milligram(s) intranasally prn   rivaroxaban 20 mg oral tablet - 1 tab(s) orally  - Daily 1800  empagliflozin 10 mg oral tablet - 1 tab(s) orally once a day  ezetimibe 10 mg oral tablet - 1 tab(s) orally once a day  rOPINIRole 2 mg oral tablet - 1 tab(s) orally once (at bedtime)     PRN Medication   torsemide 100 mg oral tablet - 1 tab(s) orally once a day (in the morning), As Needed for weight gain more than 2lb in 1 day or5 lb in one week  acetaminophen 325 mg oral tablet - 2 tab(s) orally every 4 hours, As needed, Pain - Mild (1-3)     DNR Status:   ·  Code Status Code Status order at time of discharge: Full Code     Attestation:   Note Completion:  I am a:  Resident/Fellow   Attending Attestation I saw and evaluated the patient.  I personally obtained the key and critical portions of the history and physical exam or was physically present for key and  critical portions performed by the resident/fellow. I reviewed the resident/fellow?s documentation and discussed the patient with the resident/fellow.  I  agree with the resident/fellow?s medical decision making as documented in the note.     I personally evaluated the patient on 27-Sep-2023   Comments/ Additional Findings    >30 minutes was spent preparing/coordinating this patients discharge.          Electronic Signatures:  Isai Jean)  (Signed 28-Sep-2023 07:54)   Authored: Summary Content, Ongoing Care, Note Completion   Co-Signer: Send Summary, Summary Content, Immunizations, Ongoing Care, DNR Status, Note Completion  Hyacinth Riley (DO (Fellow))  (Signed 27-Sep-2023 12:21)   Authored: Send Summary, Summary Content, Immunizations,  Ongoing Care, DNR Status, Note Completion      Last Updated: 28-Sep-2023 07:54 by Isai Jean)

## 2023-10-05 NOTE — PROGRESS NOTES
Reviewed chart and patient is a recently returned client to [facility] for receipt of continuous milrinone for class IV stage D heart failure...mother to continue to assist  Dosing at 0.25mcg/kg/min...dosing weight 140kg (308 lbs)    [facility] called and left VM with patient and mother...stated will make delivery Fri by 9pm...no callback at this time...EMR reviewed and no sign readmitted to hospital      Processed fill for 3x 48hr milrinone bags with tubing attached for 10/6 mix and straight delivery to cover hookups 10/9, 10/11, and 10/13/23.    FU 10/12/23 straight - check weight, progress, etc

## 2023-10-10 PROBLEM — Z91.51 HISTORY OF SUICIDE ATTEMPT: Status: ACTIVE | Noted: 2021-04-11

## 2023-10-10 PROBLEM — E11.9 DIABETES MELLITUS, NEW ONSET (MULTI): Status: ACTIVE | Noted: 2020-04-09

## 2023-10-10 PROBLEM — Z86.711 HISTORY OF PULMONARY EMBOLISM: Status: RESOLVED | Noted: 2023-01-01 | Resolved: 2023-01-01

## 2023-10-10 PROBLEM — K20.80 ESOPHAGITIS, LOS ANGELES GRADE D: Status: RESOLVED | Noted: 2023-01-01 | Resolved: 2023-01-01

## 2023-10-10 PROBLEM — J34.89 PERFORATED NASAL SEPTUM: Status: ACTIVE | Noted: 2023-01-01

## 2023-10-10 PROBLEM — G89.29 BACK PAIN, CHRONIC: Status: ACTIVE | Noted: 2023-01-01

## 2023-10-10 PROBLEM — F18.21: Status: ACTIVE | Noted: 2023-01-01

## 2023-10-10 PROBLEM — E55.9 VITAMIN D DEFICIENCY: Status: RESOLVED | Noted: 2023-01-01 | Resolved: 2023-01-01

## 2023-10-10 PROBLEM — R94.31 PROLONGED Q-T INTERVAL ON ECG: Status: RESOLVED | Noted: 2020-04-10 | Resolved: 2023-01-01

## 2023-10-10 PROBLEM — F19.21: Status: ACTIVE | Noted: 2023-01-01

## 2023-10-10 PROBLEM — E11.9 CONTROLLED TYPE 2 DIABETES MELLITUS WITHOUT COMPLICATION, WITHOUT LONG-TERM CURRENT USE OF INSULIN (MULTI): Chronic | Status: ACTIVE | Noted: 2020-11-20

## 2023-10-10 PROBLEM — G25.81 RLS (RESTLESS LEGS SYNDROME): Chronic | Status: ACTIVE | Noted: 2022-04-28

## 2023-10-10 PROBLEM — N50.3 EPIDIDYMAL CYST: Status: RESOLVED | Noted: 2022-04-28 | Resolved: 2023-01-01

## 2023-10-10 PROBLEM — M51.26 OTHER INTERVERTEBRAL DISC DISPLACEMENT, LUMBAR REGION: Status: RESOLVED | Noted: 2021-06-09 | Resolved: 2023-01-01

## 2023-10-10 PROBLEM — I42.7 CARDIOMYOPATHY SECONDARY TO DRUG (MULTI): Status: ACTIVE | Noted: 2022-03-17

## 2023-10-10 PROBLEM — D50.9 ANEMIA, IRON DEFICIENCY: Status: ACTIVE | Noted: 2023-01-01

## 2023-10-10 PROBLEM — E78.5 HYPERLIPIDEMIA: Status: ACTIVE | Noted: 2023-01-01

## 2023-10-10 PROBLEM — F10.20 ALCOHOL USE DISORDER, SEVERE, DEPENDENCE (MULTI): Status: RESOLVED | Noted: 2023-01-01 | Resolved: 2023-01-01

## 2023-10-10 PROBLEM — Z98.890 HISTORY OF REPAIR OF PYLORIC STENOSIS: Status: RESOLVED | Noted: 2021-04-09 | Resolved: 2023-01-01

## 2023-10-10 PROBLEM — R20.0 NUMBNESS AND TINGLING OF RIGHT LOWER EXTREMITY: Status: ACTIVE | Noted: 2020-04-18

## 2023-10-10 PROBLEM — Z91.51 HISTORY OF SUICIDE ATTEMPT: Status: RESOLVED | Noted: 2021-04-11 | Resolved: 2023-01-01

## 2023-10-10 PROBLEM — F12.21 CANNABIS DEPENDENCE, IN REMISSION (MULTI): Status: ACTIVE | Noted: 2023-01-01

## 2023-10-10 PROBLEM — E11.9 DIABETES MELLITUS, NEW ONSET (MULTI): Status: RESOLVED | Noted: 2020-04-09 | Resolved: 2023-01-01

## 2023-10-10 PROBLEM — F19.20 POLYSUBSTANCE DEPENDENCE (MULTI): Chronic | Status: ACTIVE | Noted: 2020-04-10

## 2023-10-10 PROBLEM — L81.9 MOTTLED SKIN: Status: RESOLVED | Noted: 2020-04-09 | Resolved: 2023-01-01

## 2023-10-10 PROBLEM — F17.200 TOBACCO DEPENDENCE SYNDROME: Status: RESOLVED | Noted: 2023-01-01 | Resolved: 2023-01-01

## 2023-10-10 PROBLEM — F90.9 ADHD (ATTENTION DEFICIT HYPERACTIVITY DISORDER): Status: ACTIVE | Noted: 2023-01-01

## 2023-10-10 PROBLEM — I50.9 ACUTE DECOMPENSATED HEART FAILURE (MULTI): Status: ACTIVE | Noted: 2023-01-01

## 2023-10-10 PROBLEM — K27.9 PUD (PEPTIC ULCER DISEASE): Status: ACTIVE | Noted: 2023-01-01

## 2023-10-10 PROBLEM — N45.1 EPIDIDYMITIS: Status: RESOLVED | Noted: 2023-01-01 | Resolved: 2023-01-01

## 2023-10-10 PROBLEM — J44.9 COPD (CHRONIC OBSTRUCTIVE PULMONARY DISEASE) (MULTI): Status: ACTIVE | Noted: 2023-01-01

## 2023-10-10 PROBLEM — E80.6 OTHER DISORDERS OF BILIRUBIN METABOLISM: Status: ACTIVE | Noted: 2021-02-03

## 2023-10-10 PROBLEM — Z98.890 HISTORY OF REPAIR OF PYLORIC STENOSIS: Status: ACTIVE | Noted: 2021-04-09

## 2023-10-10 PROBLEM — F10.20 ALCOHOL USE DISORDER, SEVERE, DEPENDENCE (MULTI): Status: ACTIVE | Noted: 2023-01-01

## 2023-10-10 PROBLEM — G25.81 RLS (RESTLESS LEGS SYNDROME): Chronic | Status: RESOLVED | Noted: 2022-04-28 | Resolved: 2023-01-01

## 2023-10-10 PROBLEM — E80.6 OTHER DISORDERS OF BILIRUBIN METABOLISM: Status: RESOLVED | Noted: 2021-02-03 | Resolved: 2023-01-01

## 2023-10-10 PROBLEM — R94.31 PROLONGED Q-T INTERVAL ON ECG: Status: ACTIVE | Noted: 2020-04-10

## 2023-10-10 PROBLEM — F18.21: Status: RESOLVED | Noted: 2023-01-01 | Resolved: 2023-01-01

## 2023-10-10 PROBLEM — F43.12 POST-TRAUMATIC STRESS DISORDER, CHRONIC: Status: ACTIVE | Noted: 2023-01-01

## 2023-10-10 PROBLEM — N50.3 EPIDIDYMAL CYST: Status: ACTIVE | Noted: 2022-04-28

## 2023-10-10 PROBLEM — I50.43 CHF (CONGESTIVE HEART FAILURE), NYHA CLASS I, ACUTE ON CHRONIC, COMBINED (MULTI): Status: ACTIVE | Noted: 2023-01-01

## 2023-10-10 PROBLEM — K21.9 GERD (GASTROESOPHAGEAL REFLUX DISEASE): Chronic | Status: ACTIVE | Noted: 2021-04-11

## 2023-10-10 PROBLEM — G47.33 OBSTRUCTIVE SLEEP APNEA SYNDROME: Chronic | Status: ACTIVE | Noted: 2023-01-01

## 2023-10-10 PROBLEM — R20.2 NUMBNESS AND TINGLING OF RIGHT LOWER EXTREMITY: Status: ACTIVE | Noted: 2020-04-18

## 2023-10-10 PROBLEM — R20.2 NUMBNESS AND TINGLING OF RIGHT LOWER EXTREMITY: Status: RESOLVED | Noted: 2020-04-18 | Resolved: 2023-01-01

## 2023-10-10 PROBLEM — L50.9 URTICARIA: Status: RESOLVED | Noted: 2023-01-01 | Resolved: 2023-01-01

## 2023-10-10 PROBLEM — Z86.711 HISTORY OF PULMONARY EMBOLISM: Status: ACTIVE | Noted: 2023-01-01

## 2023-10-10 PROBLEM — I51.3 LEFT VENTRICULAR APICAL THROMBUS: Chronic | Status: RESOLVED | Noted: 2020-04-09 | Resolved: 2023-01-01

## 2023-10-10 PROBLEM — L81.9 MOTTLED SKIN: Status: ACTIVE | Noted: 2020-04-09

## 2023-10-10 PROBLEM — N45.1 EPIDIDYMITIS: Status: ACTIVE | Noted: 2023-01-01

## 2023-10-10 PROBLEM — Z99.81 DEPENDENCE ON SUPPLEMENTAL OXYGEN: Status: ACTIVE | Noted: 2021-10-08

## 2023-10-10 PROBLEM — Z86.718 HISTORY OF DVT (DEEP VEIN THROMBOSIS): Status: ACTIVE | Noted: 2020-11-17

## 2023-10-10 PROBLEM — G89.29 CHRONIC ABDOMINAL PAIN: Status: RESOLVED | Noted: 2020-11-20 | Resolved: 2023-01-01

## 2023-10-10 PROBLEM — R00.0 TACHYCARDIA: Status: RESOLVED | Noted: 2023-01-01 | Resolved: 2023-01-01

## 2023-10-10 PROBLEM — L72.0 EPITHELIAL CYST: Status: RESOLVED | Noted: 2023-01-01 | Resolved: 2023-01-01

## 2023-10-10 PROBLEM — R07.9 CHEST PAIN: Status: ACTIVE | Noted: 2022-08-02

## 2023-10-10 PROBLEM — M51.26 OTHER INTERVERTEBRAL DISC DISPLACEMENT, LUMBAR REGION: Status: ACTIVE | Noted: 2021-06-09

## 2023-10-10 PROBLEM — R10.9 CHRONIC ABDOMINAL PAIN: Status: ACTIVE | Noted: 2020-11-20

## 2023-10-10 PROBLEM — I51.3 LEFT VENTRICULAR APICAL THROMBUS: Chronic | Status: ACTIVE | Noted: 2020-04-09

## 2023-10-10 PROBLEM — R68.81 EARLY SATIETY: Status: ACTIVE | Noted: 2023-01-01

## 2023-10-10 PROBLEM — K76.0 STEATOSIS OF LIVER: Status: RESOLVED | Noted: 2023-01-01 | Resolved: 2023-01-01

## 2023-10-10 PROBLEM — M54.9 BACK PAIN, CHRONIC: Status: ACTIVE | Noted: 2023-01-01

## 2023-10-10 PROBLEM — F19.20 POLYSUBSTANCE DEPENDENCE (MULTI): Chronic | Status: RESOLVED | Noted: 2020-04-10 | Resolved: 2023-01-01

## 2023-10-10 PROBLEM — F63.9 IMPULSE CONTROL DISORDER: Chronic | Status: ACTIVE | Noted: 2020-04-10

## 2023-10-10 PROBLEM — R20.0 NUMBNESS AND TINGLING OF RIGHT LOWER EXTREMITY: Status: RESOLVED | Noted: 2020-04-18 | Resolved: 2023-01-01

## 2023-10-10 PROBLEM — L50.9 URTICARIA: Status: ACTIVE | Noted: 2023-01-01

## 2023-10-10 PROBLEM — K20.80 ESOPHAGITIS, LOS ANGELES GRADE D: Status: ACTIVE | Noted: 2023-01-01

## 2023-10-10 PROBLEM — E11.59 HYPERTENSION ASSOCIATED WITH TYPE 2 DIABETES MELLITUS (MULTI): Status: ACTIVE | Noted: 2020-04-09

## 2023-10-10 PROBLEM — E66.01 OBESITY, CLASS III, BMI 40-49.9 (MORBID OBESITY) (MULTI): Status: ACTIVE | Noted: 2023-01-01

## 2023-10-10 PROBLEM — R57.0 CARDIOGENIC SHOCK (MULTI): Status: ACTIVE | Noted: 2020-04-09

## 2023-10-10 PROBLEM — R45.851 SUICIDAL IDEATION: Status: ACTIVE | Noted: 2023-01-01

## 2023-10-10 PROBLEM — I50.9 CHF EXACERBATION (MULTI): Status: RESOLVED | Noted: 2023-01-01 | Resolved: 2023-01-01

## 2023-10-10 PROBLEM — I10 ESSENTIAL HYPERTENSION: Chronic | Status: ACTIVE | Noted: 2020-04-09

## 2023-10-10 PROBLEM — Z86.718 HISTORY OF DVT (DEEP VEIN THROMBOSIS): Status: RESOLVED | Noted: 2020-11-17 | Resolved: 2023-01-01

## 2023-10-10 PROBLEM — I50.43 CHF (CONGESTIVE HEART FAILURE), NYHA CLASS I, ACUTE ON CHRONIC, COMBINED (MULTI): Status: RESOLVED | Noted: 2023-01-01 | Resolved: 2023-01-01

## 2023-10-10 PROBLEM — I50.9 CHF EXACERBATION (MULTI): Status: ACTIVE | Noted: 2023-01-01

## 2023-10-10 PROBLEM — F17.200 TOBACCO DEPENDENCE SYNDROME: Status: ACTIVE | Noted: 2023-01-01

## 2023-10-10 PROBLEM — K27.9 PUD (PEPTIC ULCER DISEASE): Status: RESOLVED | Noted: 2023-01-01 | Resolved: 2023-01-01

## 2023-10-10 PROBLEM — I51.7 CARDIOMEGALY: Status: ACTIVE | Noted: 2020-04-09

## 2023-10-10 PROBLEM — R68.81 EARLY SATIETY: Status: RESOLVED | Noted: 2023-01-01 | Resolved: 2023-01-01

## 2023-10-10 PROBLEM — G47.00 INSOMNIA: Status: ACTIVE | Noted: 2023-01-01

## 2023-10-10 PROBLEM — R10.9 CHRONIC ABDOMINAL PAIN: Status: RESOLVED | Noted: 2020-11-20 | Resolved: 2023-01-01

## 2023-10-10 PROBLEM — F19.21: Status: RESOLVED | Noted: 2023-01-01 | Resolved: 2023-01-01

## 2023-10-10 PROBLEM — R00.0 SINUS TACHYCARDIA: Status: ACTIVE | Noted: 2023-01-01

## 2023-10-10 PROBLEM — I15.2 HYPERTENSION ASSOCIATED WITH TYPE 2 DIABETES MELLITUS (MULTI): Status: ACTIVE | Noted: 2020-04-09

## 2023-10-10 PROBLEM — I50.20 HEART FAILURE WITH REDUCED EJECTION FRACTION (MULTI): Status: ACTIVE | Noted: 2020-04-09

## 2023-10-10 PROBLEM — F31.9 AFFECTIVE PSYCHOSIS, BIPOLAR (MULTI): Status: ACTIVE | Noted: 2023-01-01

## 2023-10-10 PROBLEM — E55.9 VITAMIN D DEFICIENCY: Status: ACTIVE | Noted: 2023-01-01

## 2023-10-10 PROBLEM — K40.90 INGUINAL HERNIA: Status: ACTIVE | Noted: 2023-01-01

## 2023-10-10 PROBLEM — G89.29 CHRONIC ABDOMINAL PAIN: Status: ACTIVE | Noted: 2020-11-20

## 2023-10-10 PROBLEM — R00.0 TACHYCARDIA: Status: ACTIVE | Noted: 2023-01-01

## 2023-10-10 PROBLEM — F41.1 GENERALIZED ANXIETY DISORDER: Chronic | Status: ACTIVE | Noted: 2023-01-01

## 2023-10-10 PROBLEM — I50.22 CHRONIC SYSTOLIC HEART FAILURE (MULTI): Status: ACTIVE | Noted: 2023-01-01

## 2023-10-10 PROBLEM — L72.0 EPITHELIAL CYST: Status: ACTIVE | Noted: 2023-01-01

## 2023-10-10 PROBLEM — F60.2 ANTISOCIAL PERSONALITY DISORDER (MULTI): Chronic | Status: ACTIVE | Noted: 2020-04-10

## 2023-10-10 PROBLEM — J34.89 PERFORATED NASAL SEPTUM: Status: RESOLVED | Noted: 2023-01-01 | Resolved: 2023-01-01

## 2023-10-10 PROBLEM — Z95.810 ICD (IMPLANTABLE CARDIOVERTER-DEFIBRILLATOR) IN PLACE: Status: ACTIVE | Noted: 2021-04-09

## 2023-10-10 PROBLEM — F32.9 MAJOR DEPRESSIVE DISORDER: Status: ACTIVE | Noted: 2021-04-11

## 2023-10-10 PROBLEM — F33.41 RECURRENT MAJOR DEPRESSIVE DISORDER, IN PARTIAL REMISSION (CMS-HCC): Chronic | Status: ACTIVE | Noted: 2020-11-17

## 2023-10-10 PROBLEM — K76.0 STEATOSIS OF LIVER: Status: ACTIVE | Noted: 2023-01-01

## 2023-10-10 NOTE — PROGRESS NOTES
"Stephan Gallo is a 33 y.o. male from [facility] ([place] minute from [place]), unemployed formerly a [employer] employee. Lives [place] a few streets of his mother and step-father who raise horses.    He was recently hospitalized for acutely decompensated heart failure and initiated on home milrinone. Despite this he continues feeling poorly with dyspnea on exertion and significant chest pressure.    On exam today: /76 (BP Location: Right arm, Patient Position: Sitting, BP Cuff Size: Large adult)   Pulse 57   Temp 36.6 °C (97.8 °F) (Temporal)   Ht 1.727 m (5' 8\")   Wt 139 kg (307 lb)   BMI 46.68 kg/m²  he appears pale, he is tachypnic, his extremities are cool.    Of note: he finished serving a sentence for [employer], and is no longer wearing an ankle bracelet. He told me that he is now on probation alone.    Medical history:  -- COPD (also due to hydrocarbon inhalants)  -- Diabetes type 2  -- Anxiety/Depression/PTSD, prior self-harming behavior  -- ADHD  -- DAVONTE, not on CPAP at this time  -- Smokes cigarettes  -- Bout of C diff (1/2021)  -- Congenital pyloric stenosis s/p surgery  -- History of blood clot in LV, and history of PE on Xarelto    Cardiac history:  -- Cardiomyopathy diagnosed 2018, due to hydrocarbon intoxication (abusing inhalants); [facility] 6/2020 at [place] per patient no coronary disease  -- HFrEF, NYHA class IV, Stage D; LVEF 10%, RV dysfunction, moderate to severe TR  -- ICD (6/2020) that had to be extracted due to device infection; now has SQ ICD    Assessment: Stage D HFrEF, failing milrinone therapy    Recommendations:  -- Admit to the hospital for management of acutely decompensated heart failure  -- Initiate evaluation for LVAD. I spoke to patient and his mom at length about this therapy and short/intermediate/long term implications to him. He believes that with support of his mother, as well as his psychiatrist + therapist he will be able to handle this therapy. He was thankful that we " would be willing to bring his case to the committee.    Kisha Reeves MD, MPH  Advanced Heart Failure and Transplant Cardiology  [facility]

## 2023-10-10 NOTE — H&P
Referring/Heart failure Cardiologist: Dr. Reeves     HPI:    Stephan Gallo is a 32 y/o M with PMHx significant for stage D, NYHA class IV HFrEF (10%) with RV dysfunction s/p ICD (6/2020) now with subcutaneous ICD 2/2 infection, nonischemic cardiomyopathy (2018 2/2 hydrocarbon intoxication), moderate to severe TR, COPD, DAVONTE (not on CPAP), DM2, Anxiety, Depression, PTSD, ADHD, nicotine abuse (current smoker), ETOH abuse, MSSA, GERD, LV thrombus, and PE (on xarelto) who presented to his outpatient clinic visit with Dr. Reeves where he admitted to dyspnea on exertion and significant chest pressure. Patient was hemodynamically stable, but tachypneic and extremities were cool. He is being admitted to HFICU for further management of Acute Decompensated Heart Failure. Of note patient was recently admitted (9/24-9/27) for chest pain and SOB. Mixed venous sat was 32% and SGC # were consistent with low output state. Patient was diuresed and discharged home on milrinone 0.25 mcg/kg/min.     Upon arrival to the HFICU patient was HDS, afebrile and tachycardic. Extremities warm to touch. Complains of chest pain, nausea, abdominal distension and SOB.      Cardiac Tests:  EKG (10/10/23): sinus tachycardia    Echo (8/30/23): LVEF 15-20%, LV severely dilated. Global hypokinesis of the LV. Moderately reduced RV function. LA mild-moderately dilated. RA is moderately to severely dilated. Moderately elevated PAP.    NM Cardiac Perfusion Stress (7/10/23): normal perfusion but LVEF 19%, no evidence of scarred myocardium. LV dilated.     [facility] (6/2020) at [place] per patient: no CAD     Closing SGC # (9/5/23): BP 93/57 (62), CVP 6, PAP 43/23 (29), PAWP 13, CO/CI 7.0/2.87, , SVO2 71% on Dobutamine 5mcg/kg/min, Entresto 97/103, Empa 10, Carlo 50     PMH:     Past Medical History:   Diagnosis Date    Alcohol use disorder, severe, dependence (CMS/[facility]) 10/10/2023    CHF (congestive heart failure) (CMS/HCC)     Congenital hypertrophic  pyloric stenosis     Pyloric stenosis, congenital    COPD (chronic obstructive pulmonary disease) (CMS/[facility])     Diabetes mellitus (CMS/[facility])     Drug abuse (CMS/[facility])     Esophagitis, [place] grade D 10/10/2023    ETOH abuse     GERD (gastroesophageal reflux disease)     History of DVT (deep vein thrombosis) 11/17/2020    Formatting of this note might be different from the original. Formatting of this note might be different from the original. -continue Xarelto Last Assessment & Plan: Formatting of this note might be different from the original. Condition: stable Follow up in: one year    History of pulmonary embolism 10/10/2023    History of repair of pyloric stenosis 04/09/2021    Formatting of this note might be different from the original. As infant Last Assessment & Plan: Formatting of this note might be different from the original. Condition: stable Follow up in: one year    History of suicide attempt 04/11/2021    Last Assessment & Plan: Formatting of this note might be different from the original. Condition: stable Follow up in: one year    Inhalant use disorder, moderate, in sustained remission (CMS/[facility]) 10/10/2023    Left ventricular apical thrombus 04/09/2020    Formatting of this note might be different from the original. Noted on TTE Echo 4/9/2020 - started on eliquis (concerns regarding compliance with INR checks Last Assessment & Plan: Formatting of this note might be different from the original. Patient is on Eliquis at home for this.  Will hold the since patient is on heparin.  Can resume once heparin is stopped. Formatting of this note might be dif    DAVONTE (obstructive sleep apnea)     Polysubstance dependence (CMS/[facility]) 04/10/2020    Formatting of this note might be different from the original. Evaluated by psychiatry and inclusion health teams 4/2020 - believed that dual diagnosis outpatient program would be most beneficial to patient, he is amenable to ongoing outpatient rehab Last Assessment &  Plan: Formatting of this note might be different from the original. Patient's UDS was positive for fentanyl.  However, this most lik    PUD (peptic ulcer disease) 10/10/2023    RLS (restless legs syndrome) 04/28/2022    Tachycardia 10/10/2023    Tobacco dependence syndrome 10/10/2023    Vitamin D deficiency 10/10/2023        PSH:  Past Surgical History:   Procedure Laterality Date    CARDIAC DEFIBRILLATOR PLACEMENT      CT ABDOMEN PELVIS ANGIOGRAM W AND/OR WO IV CONTRAST  12/13/2020    CT ABDOMEN PELVIS ANGIOGRAM W AND/OR WO IV CONTRAST 12/13/2020 [facility]    CT ABDOMEN PELVIS ANGIOGRAM W AND/OR WO IV CONTRAST  02/07/2021    CT ABDOMEN PELVIS ANGIOGRAM W AND/OR WO IV CONTRAST 2/7/2021 [facility]    CT NECK ANGIO W AND WO IV CONTRAST  07/07/2022    CT NECK ANGIO W AND WO IV CONTRAST 7/7/2022 [facility]    FL GUIDED ABSCESS FLUID COLLECTION DRAINAGE  06/27/2021    FL GUIDED ABSCESS FLUID COLLECTION DRAINAGE 6/27/2021 [facility]    OTHER SURGICAL HISTORY  02/15/2021    Colonoscopy    OTHER SURGICAL HISTORY  02/15/2021    Endoscopy    OTHER SURGICAL HISTORY  04/22/2021    Cardioverter defibrillator insertion    OTHER SURGICAL HISTORY  04/22/2021    Pyloromyotomy    US GUIDED ASPIRATION INJECTION MAJOR JOINT  06/08/2021    US GUIDED ASPIRATION INJECTION MAJOR JOINT 6/8/2021 [facility]         Allergies:  Allergies   Allergen Reactions    Adhesive Rash     States adhesive tape makes his skin burn    Adhesive Tape-Silicones Anaphylaxis and Rash    Bee Venom Protein (Honey Bee) Anaphylaxis and Swelling    Insect Venom Anaphylaxis    Wasp Venom Anxiety, Hives, Itching, Other and Swelling        Social Hx:  Social History     Socioeconomic History    Marital status: Single     Spouse name: Not on file    Number of children: Not on file    Years of education: Not on file    Highest education level: Not on file   Occupational History    Not on file   Tobacco Use    Smoking  status: Every Day     Packs/day: .5     Types: Cigarettes    Smokeless tobacco: Not on file   Substance and Sexual Activity    Alcohol use: Not Currently     Comment: quit in 2020    Drug use: Not Currently     Types: Solvent inhalants, Cocaine     Comment: States he quit in 2020    Sexual activity: Not on file   Other Topics Concern    Not on file   Social History Narrative    Not on file     Social Determinants of Health     Financial Resource Strain: Not on file   Food Insecurity: Not on file   Transportation Needs: Not on file   Physical Activity: Not on file   Stress: Not on file   Social Connections: Not on file   Intimate Partner Violence: Not on file   Housing Stability: Not on file       FHx:  Family History   Problem Relation Name Age of Onset    Diabetes Father      Colon cancer Father          Home Medication:  Prior to Admission medications    Medication Sig Start Date End Date Taking? Authorizing Provider   ALPRAZolam XR (Xanax XR) 2 mg 24 hr tablet PLEASE SEE ATTACHED FOR DETAILED DIRECTIONS 5/18/23   Historical Provider, MD   apixaban (Eliquis) 5 mg tablet Take 1 tablet (5 mg) by mouth twice a day. 4/20/20   Historical Provider, MD   atorvastatin (Lipitor) 40 mg tablet Take 1 tablet (40 mg) by mouth once daily at bedtime. 12/2/22   Historical Provider, MD   blood sugar diagnostic strip USE TO TEST BEFORE MEALS AND AT BEDTIME 4/27/23 4/26/24  SATHYA oR-CNP   DULoxetine (Cymbalta) 30 mg DR capsule Take 1 capsule (30 mg) by mouth 2 times a day.    Historical Provider, MD   empagliflozin (Jardiance) 10 mg Take 1 tablet (10 mg) by mouth once daily. 12/9/21   Historical Provider, MD   ezetimibe (Zetia) 10 mg tablet Take 1 tablet (10 mg) by mouth once daily. 9/11/23   Historical Provider, MD   gabapentin (Neurontin) 100 mg capsule TAKE 2 CAPSULES BY MOUTH THREE TIMES A DAY. 9/11/23 9/10/24  SATHYA Hernandez-CNP   hydrOXYzine HCL (Atarax) 50 mg tablet Take 2 tablets (100 mg) by mouth once daily  at bedtime. 8/3/23   Historical Provider, MD   insulin lispro (HumaLOG) 100 unit/mL injection INJECT THREE TIMES A DAY VIA ATTACHED SLIDING SCALE 4/27/23 4/26/24  SATHYA Ro-CNP   ipratropium-albuteroL (Duo-Neb) 0.5-2.5 mg/3 mL nebulizer solution Ipratropium-Albuterol 0.5-2.5 (3) MG/3ML Inhalation Solution  Quantity: 180   Refills: 0  Start: 2-Dec-2022 12/2/22   Historical Provider, MD   Klor-Con M20 20 mEq ER tablet Take 2 tablets (40 mEq) by mouth once daily. 12/2/22   Historical Provider, MD   lisinopril 5 mg tablet 1 tab(s) orally once a day (in the morning) 7/10/22   Historical Provider, MD   magnesium oxide (Mag-Ox) 400 mg (241.3 mg magnesium) tablet Take 1 tablet (400 mg) by mouth 2 times a day. 7/11/23   Historical Provider, MD   metoprolol succinate XL (Toprol-XL) 25 mg 24 hr tablet Take 1 tablet (25 mg) by mouth once daily. 11/10/22   Historical Provider, MD   milrinone in 5 % dextrose (Primacor) 40 mg/200 mL (200 mcg/mL) infusion 0.25 mcg/kg/min intravenous continuously 9/25/23   Historical Provider, MD   QUEtiapine (SEROquel) 200 mg tablet Take 1 tablet (200 mg) by mouth once daily at bedtime. 11/10/22   Historical Provider, MD   rivaroxaban (Xarelto) 20 mg tablet 1 tab(s) orally  - Daily 1800 6/20/22   Historical Provider, MD   rOPINIRole (Requip) 2 mg tablet Take 1 tablet (2 mg) by mouth once daily. 8/16/23   Historical Provider, MD   sacubitriL-valsartan (Entresto) 24-26 mg tablet Take 1 tablet by mouth twice a day. 4/11/21   Historical Provider, MD   spironolactone (Aldactone) 25 mg tablet Take 1 tablet (25 mg) by mouth once daily. 1/19/22   Historical Provider, MD   torsemide (Demadex) 100 mg tablet 1 tab(s) orally once a day (in the morning), As Needed for weight gain more than 2lb in 1 day or5 lb in one week 12/2/22   Historical Provider, MD   traZODone (Desyrel) 150 mg tablet Take 1 tablet (150 mg) by mouth once daily. 4/1/22   Historical Provider, MD   acetaminophen (Tylenol) 325 mg  tablet Take 2 tablets (650 mg) by mouth every 4 hours if needed. 9/11/23 10/10/23  Historical Provider, MD   ALPRAZolam (Xanax) 0.5 mg tablet Take 4 tablets (2 mg) by mouth 3 times a day as needed for anxiety. 3/7/22 10/10/23  Historical Provider, MD   ALPRAZolam (Xanax) 1 mg tablet TAKE 1 TABLET BY MOUTH THREE TIMES A DAY AS DIRECTED  10/10/23  Historical Provider, MD   cholecalciferol (Vitamin D-3) 25 MCG (1000 UT) tablet 1 tab(s) orally once a day (in the morning) 5/30/23 10/10/23  Historical Provider, MD   cyanocobalamin (Vitamin B-12) 1,000 mcg tablet Take 1 tablet (1,000 mcg) by mouth once daily. 3/25/21 10/10/23  Historical Provider, MD   dextrose 5 % in water (D5W) parenteral solution 250 mL with DOBUTamine 250 mg/20 mL (12.5 mg/mL) solution 9.6 milliliter(s)/hr by continuous intravenous infusion, 5mcg/kg/hrHt: 172.7cmMed Calc Wt: 138kg 9/11/23 10/10/23  Historical Provider, MD   dextrose 5 % in water, D5W, solution  10/6/23 10/10/23  Historical Provider, MD   dicyclomine (Bentyl) 20 mg tablet TAKE 1 TABLET 4 TIMES DAILY as needed 2/15/21 10/10/23  Historical Provider, MD   digoxin (Lanoxin) 250 MCG tab;et TAKE 1 TABLET BY MOUTH ONCE DAILY  Patient not taking: Reported on 10/10/2023 9/11/23 10/10/23  Edgar Truong, APRN-CNP   DOBUTamine (Dobutrex) 250 mg/20 mL (12.5 mg/mL) injection  9/18/23 10/10/23  Historical Provider, MD   empagliflozin (Jardiance) 25 mg 1 tab(s) orally once a day (in the morning) 1/11/23 10/10/23  Historical Provider, MD   EPINEPHrine 0.3 mg/0.3 mL injection syringe INJECT 0.3 ML INTRAMUSCULARLY AS DIRECTED 8/3/21 10/10/23  Historical Provider, MD   escitalopram (Lexapro) 20 mg tablet 1 tab(s) orally once a day (in the morning) 2/15/21 10/10/23  Historical Provider, MD   ferrous sulfate 325 (65 Fe) MG tablet Take 1 tablet (325 mg) by mouth once daily. 2/15/21 10/10/23  Historical Provider, MD   glimepiride (Amaryl) 2 mg tablet Take 1 tablet (2 mg total) by mouth. 4/11/21 10/10/23   "Historical Provider, MD   hydrALAZINE (Apresoline) 100 mg tablet Take 1 tablet (100 mg) by mouth 3 times a day. 3/25/21 10/10/23  Historical Provider, MD   hydrALAZINE (Apresoline) 50 mg tablet 1 tablet (50 mg total) 3 (three) times a day. 4/11/21 10/10/23  Historical Provider, MD   hydrOXYzine pamoate (Vistaril) 100 mg capsule 1 tab(s) orally once a day (at bedtime)  10/10/23  Historical Provider, MD   hydrOXYzine pamoate (Vistaril) 50 mg capsule  4/27/23 10/10/23  Historical Provider, MD   isosorbide dinitrate (Isordil) 20 mg tablet Take 1 tablet (20 mg) by mouth every 8 hours. 3/25/21 10/10/23  Historical Provider, MD   lancets 33 gauge misc USE TO TEST BEFORE MEALS AND AT BEDTIME 4/27/23 10/10/23  SATHYA Ro-RODGER   multivit-min-iron fum-folic ac 7.5 mg iron-400 mcg tablet Take 1 tablet by mouth once daily. 1/11/23 10/10/23  Historical Provider, MD   naloxone (Narcan) 4 mg/0.1 mL nasal spray 4ML INTRANASALLY AS NEEDED 9/28/23 10/10/23  Historical Provider, MD   nicotine (Nicoderm CQ) 21 mg/24 hr patch  4/27/23 10/10/23  Historical Provider, MD   nicotine polacrilex (Nicorette) 2 mg gum Take 1 each (2 mg) by mouth every 2 hours. 5/30/23 10/10/23  Historical Provider, MD   ondansetron (Zofran) 4 mg tablet 1 tablet (4 mg total) every 8 (eight) hours if needed. 4/11/21 10/10/23  Historical Provider, MD   oxyCODONE (Roxicodone) 5 mg immediate release tablet TAKE 1 TABLET BY MOUTH EVERY 6-8 HOURS AS NEEDED.  Patient not taking: Reported on 10/10/2023 9/11/23 10/10/23  SATHYA Hernandez-CNP   pantoprazole (ProtoNix) 40 mg EC tablet Take 1 tablet (40 mg) by mouth once daily. 3/25/21 10/10/23  Historical Provider, MD   pen needle, diabetic 32 gauge x 5/32\" needle USE THREE TIMES A DAY 4/27/23 10/10/23  Nhung Feliciano, APRN-CNP   POLYETHYLENE GLYCOL 3350 ORAL Take 17 g by mouth once daily. 9/11/23 10/10/23  Historical Provider, MD   rOPINIRole (Requip) 0.25 mg tablet Take 1 tablet (0.25 mg) by mouth once daily at " bedtime. 6/16/23 10/10/23  Vasu Alfaro MD   rOPINIRole (Requip) 4 mg tablet Take 1 tablet (4 mg) by mouth once daily. 8/21/23 10/10/23  Historical Provider, MD   sacubitriL-valsartan (Entresto) 49-51 mg tablet Take 1 tablet by mouth 2 times a day. 3/25/21 10/10/23  Historical Provider, MD   sennosides-docusate sodium (Keyonna-Colace) 8.6-50 mg tablet Take 2 tablets by mouth twice a day. 9/11/23 10/10/23  Historical Provider, MD   sertraline (Zoloft) 50 mg tablet Take 1 tablet (50 mg) by mouth once daily. 11/10/22 10/10/23  Historical Provider, MD   sucralfate (Carafate) 100 mg/mL suspension  4/11/21 10/10/23  Historical Provider, MD   traZODone (Desyrel) 100 mg tablet Take 1 tablet (100 mg) by mouth once daily at bedtime. 4/1/22 10/10/23  Historical Provider, MD   Vraylar 4.5 mg capsule Take 1 capsule (4.5 mg) by mouth once daily.  10/10/23  Historical Provider, MD        Review of Systems   Constitutional:  Positive for fatigue. Negative for activity change, appetite change and chills.   HENT:  Negative for congestion, facial swelling and rhinorrhea.    Respiratory:  Positive for shortness of breath. Negative for apnea, cough, chest tightness and wheezing.    Cardiovascular:  Positive for chest pain. Negative for palpitations and leg swelling.   Gastrointestinal:  Positive for abdominal distention, nausea and vomiting. Negative for abdominal pain, constipation and diarrhea.   Genitourinary:  Negative for difficulty urinating, dysuria, flank pain and frequency.   Musculoskeletal:  Positive for back pain and myalgias. Negative for neck pain.   Skin:  Negative for color change, pallor and rash.   Neurological:  Negative for dizziness, syncope, weakness and headaches.   Psychiatric/Behavioral:  Negative for agitation, behavioral problems and confusion.         Physical Exam  Constitutional:       Appearance: He is obese. He is ill-appearing.   Neck:      Vascular: No hepatojugular reflux or JVD.      Comments: Enrrique  present   Cardiovascular:      Rate and Rhythm: Regular rhythm. Tachycardia present.      Pulses: Normal pulses.      Heart sounds: Normal heart sounds, S1 normal and S2 normal.   Pulmonary:      Effort: No tachypnea, accessory muscle usage or respiratory distress.      Breath sounds: Normal breath sounds. No stridor. No wheezing, rhonchi or rales.   Abdominal:      General: Bowel sounds are normal. There is distension.      Palpations: There is no hepatomegaly or splenomegaly.      Tenderness: There is no abdominal tenderness.   Musculoskeletal:      Cervical back: Normal range of motion.      Right lower leg: No edema.      Left lower leg: No edema.   Neurological:      General: No focal deficit present.      Mental Status: He is alert and oriented to person, place, and time. Mental status is at baseline.          Assessment & Plan:    Neuro:  #ADHD  #Depression with history of suicidal ideations   #Chronic pain   #Restless Leg Syndrome   -C/w home ropinirole 2 mg at bedtime   -C/w home quetiapine fumarate 200 mg at bedtime   -C/w home cymbalta 30 mg BID   -Holding home gabapentin 200 TID, patient states it does not help   -C/w home xanax 2 mg TID PRN for anxiety  -C/w home trazodone 150 mg at night  -Hydroxyzine 25 mg every 6 hours PRN  -Tylenol for mild pain, oxycodone 5-10 mg for severe/ breakthrough pain PRN   -Serial neuro and pain assessments   -PO Tylenol PRN for pain  -PT/OT Consult, OOB to chair  -CAM ICU score every shift  -Sleep/wake cycle normalization     #Physical Status  -Morbid Obesity- BMI 46     #Substance abuse  History of ETOH abuse (quit in 2020)  Tobacco use/Nicotine Dependence (smokes 1-2 cigarettes daily)  Former cocaine user (quit in his 20s)  Hydrocarbon abuse (2020)  -Tox screen pending      Cardiovascular:  #NYHA Class IV, Stage D systolic heart failure NICM/HFrEF (15-20%)  #S/p ICD (2020) c/b infection/ endocarditis replaced with subcutaneous ICD (2022)  Echo (8/30/23): LVEF 15-20%, LV  severely dilated. Global hypokinesis of the LV. Moderately reduced RV function. LA mild-moderately dilated. RA is moderately to severely dilated. Moderately elevated PAP.  Closing SGC # (9/5/23): BP 93/57 (62), CVP 6, PAP 43/23 (29), PAWP 13, CO/CI 7.0/2.87, , SVO2 71% on Dobutamine 5mcg/kg/min, Entresto 97/103, Empa 10, [place] 50   Advance therapy work-up done previously, but was declined due to social an substance abuse hx  Admit weight 139 kg   Admit BNP pending   -C/w home spironolactone 25 mg daily   -C/w home milrinone 0.25 mcg/kg/min   -C/w home entresto 24/26 mg BID  -C/w empagliflozin 10 mg daily   -Holding home metoprolol succinate 25 mg daily  -Takes torsemide 100 mg daily, assess diuresis need   -Plan for RHC tomorrow   -Daily standing weights, regular diet, 2L fluid restriction, strict I&Os    #Chest Pain  EKG (10/10/23) sinus tachycardia   -Troponin pending     #Nonobstructive CAD   C (2020) at [place] negative     #HLD  Lipid panel (8/29/23): cholesterol 238, HDL 32, , VLDL 75,   -C/w home ezetimibe 10 mg daily   -C/w home atorvastatin 40 mg at night      #No history of Arrhythmias  -Initially, ICD placed in 2020. Removed due to infection.  -Subcutaneous ICD in place for primary prevention      #Electrolyte Disturbances  -Keep K>4 and Mag>2     Pulmonary:   #Hx of PE (on Xarelto at home)  #DAVONTE (not on CPAP)  CTA (9/22/23) negative for PE  -Start heparin gtt, holding home xarelto   -Monitor and maintain SpO2 > 92%  -CXR pending      GI:  #Nausea  -Bowel regimen   -Zofran PRN      :  #No history of CKD  -Baseline BUN/Cr ~15/0.9  -Admit BUN/Cr pending   -I/Os  -Avoid hypotension and nephrotoxic agents     #Hx of Epididymal Cystic lesion  US Scrotum w Doppler (3/28/23) A large complex cystic lesion in the left epididymal head is slightly smaller compared to prior imaging with a new echogenic component without internal vascularity. Recommend follow-up as clinically  indicated  -Continue to monitor      Heme:  #No active issues   Previous iron studies (8/29): vitamin B12 340, ferritin 239, folate 16, iron 80, TIBC 377, %sat 22  -Repeat iron studies pending      Endo:  #DM2  HgbA1c (8/29) 6.2  -Starting SSI     #Thyroid  TSH (8/29/23) 2.05, Free T4 2.05  -Repeat TSH pending       ID:  -Afebrile, nontoxic, no s/s infx  -Trend temps q4h     Feeding/fluids: Regular  Thromboprophylaxis: SCDs, heparin gtt  VAP bundle: n/a  Ulcer prophylaxis: n/a  Glycemic control: Sliding scale   Bowel care: Colace & miralax PRN  Indwelling catheter: None     PT/OT: following      Code Status: Full Code  Access: ANSHULs + FABIAN     Family Primary Contact/Next of Kin: Cathy Gallo (mother) [phone]     Dispo: Admit to ICU      A. -G. reviewed      Dr. Jean aware of patient's admission

## 2023-10-10 NOTE — PATIENT INSTRUCTIONS
Thank you for coming to see us today! To reach Dr. Reeves's office please call [phone]. Fax [phone]. Call [phone] to schedule an appointment. You may also contact the HF RNs at [email or web address]  (Please include your name and date of birth)  For MEDICATION REFILLS, please call [phone] option [phone] then option 1.    We will direct admit you and start an evaluation for an LVAD.

## 2023-10-11 NOTE — CARE PLAN
Problem: Pain  Goal: My pain/discomfort is manageable  Outcome: Progressing     Problem: Safety  Goal: Patient will be injury free during hospitalization  Outcome: Progressing  Goal: I will remain free of falls  Outcome: Progressing     Problem: Daily Care  Goal: Daily care needs are met  Outcome: Progressing     Problem: Psychosocial Needs  Goal: Demonstrates ability to cope with hospitalization/illness  Outcome: Progressing  Goal: Collaborate with me, my family, and caregiver to identify my specific goals  Outcome: Progressing       The clinical goals for the shift include  remain safe throughout shift    Over the shift, the patient did make progress toward the following goals.

## 2023-10-11 NOTE — ED NOTES
Pharmacy Medication History Review    Stephan Gallo is a 33 y.o. male admitted for Acute decompensated heart failure (CMS/[facility]). Pharmacy reviewed the patient's [id number] medications and allergies for accuracy.    The list below reflectives the updated PTA list. Please review each medication in order reconciliation for additional clarification and justification.  Medications Prior to Admission   Medication Sig Dispense Refill Last Dose    acetaminophen (Tylenol) 325 mg tablet Take 2 tablets (650 mg) by mouth every 4 hours if needed for mild pain (1 - 3).   Unknown    ALPRAZolam XR (Xanax XR) 2 mg 24 hr tablet Take 1 tablet (2 mg) by mouth 3 times a day.   10/10/2023    blood sugar diagnostic strip USE TO TEST BEFORE MEALS AND AT BEDTIME (Patient not taking: Reported on 10/11/2023) 100 each 1 Unknown    cholecalciferol (Vitamin D-3) 25 MCG (1000 UT) capsule Take 1 capsule (25 mcg) by mouth once daily in the morning.   10/10/2023    cyanocobalamin (Vitamin B-12) 1,000 mcg tablet Take 100 mcg by mouth once daily.   10/10/2023    diphenhydrAMINE (Benadryl Allergy) 25 mg tablet Take 1 tablet (25 mg) by mouth as needed at bedtime for allergies.   Unknown    DULoxetine (Cymbalta) 30 mg DR capsule Take 1 capsule (30 mg) by mouth 2 times a day.   10/10/2023    empagliflozin (Jardiance) 10 mg Take 1 tablet (10 mg) by mouth once daily.   10/10/2023    ezetimibe (Zetia) 10 mg tablet Take 1 tablet (10 mg) by mouth once daily.   Unknown    gabapentin (Neurontin) 100 mg capsule TAKE 2 CAPSULES BY MOUTH THREE TIMES A DAY. 180 capsule 3 10/10/2023    hydrOXYzine HCL (Atarax) 50 mg tablet Take 2 tablets (100 mg) by mouth once daily at bedtime.   10/9/2023 at evening    insulin lispro (HumaLOG) 100 unit/mL injection INJECT THREE TIMES A DAY VIA ATTACHED SLIDING SCALE (Patient not taking: Reported on 10/11/2023) 15 mL 1 Not Taking    magnesium oxide (Mag-Ox) 400 mg (241.3 mg magnesium) tablet Take 1 tablet (400 mg) by  mouth once daily.   10/10/2023    milrinone in 5 % dextrose (Primacor) 40 mg/200 mL (200 mcg/mL) infusion 0.25 mcg/kg/min intravenous continuously   10/10/2023    polyethylene glycol (Glycolax, Miralax) 17 gram/dose powder Take 17 g by mouth once daily.   10/10/2023    QUEtiapine (SEROquel) 200 mg tablet Take 1 tablet (200 mg) by mouth once daily at bedtime.   10/9/2023 at evening    rivaroxaban (Xarelto) 20 mg tablet 1 tab(s) orally  - Daily 1800   10/10/2023    rOPINIRole (Requip) 2 mg tablet Take 1 tablet (2 mg) by mouth once daily at bedtime.   10/9/2023 at evening    sennosides-docusate sodium (Keyonna-Colace) 8.6-50 mg tablet Take 2 tablets by mouth 2 times a day.   10/10/2023    spironolactone (Aldactone) 25 mg tablet Take 1 tablet (25 mg) by mouth once daily.   10/10/2023    torsemide (Demadex) 100 mg tablet Take 1 tablet (100 mg) by mouth once daily.   10/10/2023    traZODone (Desyrel) 150 mg tablet Take 1 tablet (150 mg) by mouth once daily at bedtime.   10/9/2023 at evening        The list below reflectives the updated allergy list. Please review each documented allergy for additional clarification and justification.  Allergies  Reviewed by Renaldo Dunlap PA-C on 10/10/2023        Severity Reactions Comments    Adhesive High Rash States adhesive tape makes his skin burn    Adhesive Tape-silicones High Anaphylaxis, Rash     Bee Venom Protein (honey Bee) High Anaphylaxis, Swelling     Insect Venom High Anaphylaxis     Wasp Venom Low Anxiety, Hives, Itching, Other, Swelling             Below are additional concerns with the patient's PTA list.  Patient was a reliable historian.   Patient reported taking Torsemide 20 mg x 5 tablets per day to get to the total daily dose of 100 mg  Patient stated that for his milrinone infusion he thinks he gets ~55mL every 48 hours. Per progress note from [facility] it appears his dosing weight was 140 kg (308 lbs) at a rate of 0.25 mcg/kg/min.  It is also unclear  if the patient has gotten a refill on his ezetimbe and restarted taking. It was last filled on 6/19/23 and he reported needing a refill at his last hospital stay but he said he wasn't sure if he refilled it yet    Ada Fitzgerald, PharmD    Meds PGY1 Community Pharmacy Resident   Medication Reconciliation Complete   Please reach out via EPIC chat with questions, or if no response call [phone] or [facility]

## 2023-10-11 NOTE — PROGRESS NOTES
Occupational Therapy                 Therapy Communication Note    Patient Name: Stephan Gallo  MRN: [id number]  Today's Date: 10/11/2023     Discipline: Occupational Therapy    Missed Visit Reason: Missed Visit Reason:  (HF team at bedside for [place] insertion; will attempt OT next visit as appropriate)    Missed Time: Attempt    Comment:

## 2023-10-11 NOTE — PROGRESS NOTES
HFICU Attending Note    33 y.o. male followed by my colleague Dr. Reeves with stage D NICM/HFrEF (EF 15-20%, LVIDD 7.7, mod RV function) on palliative inotropes admitted with worsening functional status from outpatient clinic with concern for milrinone failure. Difficult psychosocial history but recent adherance/sobriety (though concern for psychiatric issues and desire for chronic narcotic therapy but no contract). Will place swan to document inotropic failure and if deranged (though agree with outpatient assessment) will initiate LVAD evaluation. RV and psychosocial concerns will merit careful consideration.     Not transplant candidate due to active smoking, prior substance use, suicidality, brief homocidality, obesity     This critically ill patient continues to be at-risk for clinically significant deterioration / failure due to the above mentioned dysfunctional, unstable organ systems.  I have personally identified and managed all complex critical care issues to prevent aforementioned clinical deterioration.  Critical care time is spent at bedside and/or the immediate area and has included, but is not limited to, the review of diagnostic tests, labs, radiographs, serial assessments of hemodynamics, respiratory status, ventilatory management, and family updates.  Time spent in procedures and teaching are reported separately.    Critical care time: __35_ minutes     _________________________________________________  Isai Jean MD     Objective   Admit Date: 10/10/2023  Hospital Length of Stay: 1   ICU Length of Stay: 14h     PROBLEMS:  Active Hospital Problems    *Acute decompensated heart failure (CMS/HCC)      Sinus tachycardia        MEDICATIONS  Infusions:  heparin, Last Rate: 1,900 Units/hr (10/11/23 0616)  milrinone, Last Rate: 0.25 mcg/kg/min (10/11/23 0223)      Scheduled:  atorvastatin, 40 mg, Nightly  DULoxetine, 30 mg, BID  empagliflozin, 10 mg, Daily  ezetimibe, 10 mg, Daily  insulin  lispro, 0-5 Units, TID with meals  ipratropium-albuteroL, 3 mL, TID  polyethylene glycol, 17 g, Daily  QUEtiapine, 200 mg, Nightly  rOPINIRole, 2 mg, Daily  sacubitriL-valsartan, 1 tablet, BID  spironolactone, 25 mg, Daily      PRN:  acetaminophen, 650 mg, q6h PRN   Or  acetaminophen, 650 mg, q6h PRN   Or  acetaminophen, 650 mg, q6h PRN  ALPRAZolam, 2 mg, TID PRN  dextrose 10 % in water (D10W), 0.3 g/kg/hr, Once PRN  dextrose, 25 g, q15 min PRN  glucagon, 1 mg, q15 min PRN  heparin, 5,000-10,000 Units, q4h PRN  hydrOXYzine HCL, 25 mg, q6h PRN  ondansetron, 4 mg, q6h PRN  oxyCODONE, 10 mg, q6h PRN  oxyCODONE, 5 mg, q6h PRN  oxygen, , Continuous PRN - O2/gases  traZODone, 150 mg, Nightly PRN      Invasive Hemodynamics:    Most Recent Range Past 24hrs   BP (Art)   No data recorded   MAP(Art)   No data recorded   RA/CVP   No data recorded   PA   No data recorded   PA(mean)   No data recorded   PCWP   No data recorded   CO   No data recorded   CI   No data recorded   Mixed Venous   No data recorded   SVR    No data recorded   PVR   No data recorded     MCS:   Heart Mate III:     Most Recent Range Past 24hrs   Flow   No data recorded   Speed   No data recorded   Power   No data recorded   PI   No data recorded     ECMO:     Most Recent Range Past 24hrs   Flow   No data recorded   Speed   No data recorded   Sweep   No data recorded     Impella:      Most Recent Range Past 24hrs   Performance Level   No data recorded   Flow (L/min)   No data recorded   Motor Current   No data recorded   Placement Signal    Placement OK could not be evaluated. This SmartLink does not work with rows of the type: Custom List   Purge (mmHg)   No data recorded   Purge rate (mL/hr)   No data recorded     VENT:    Most Recent Range Past 24hrs   Mode      FiO2 40 % FiO2 (%)  Min: 40 %   Min taken time: 10/11/23 0339  Max: 40 %   Max taken time: 10/11/23 0339   Rate   No data recorded   Vt    No data recorded   PEEP   No data recorded     PHYSICAL  EXAM:   Vitals:    10/11/23 0400 10/11/23 0500 10/11/23 0600 10/11/23 0700   BP: (!) 130/95 127/67 126/69 124/80   Pulse: 100 101 103 102   Resp:       Temp:    35 °C (95 °F)   TempSrc:       SpO2: 99% 99% 95% 100%   Weight:   140 kg (308 lb 10.3 oz)      Wt Readings from Last 5 Encounters:   10/11/23 140 kg (308 lb 10.3 oz)   10/10/23 139 kg (307 lb)   12/08/22 133 kg (293 lb)   11/10/22 133 kg (292 lb 12.8 oz)   04/19/22 120 kg (264 lb 2 oz)     INTAKE/OUTPUT:  I/O last 3 completed shifts:  In: 342.2 (2.4 mL/kg) [I.V.:342.2 (2.4 mL/kg)]  Out: 300 (2.1 mL/kg) [Urine:300 (0.1 mL/kg/hr)]  Weight: 140 kg      DATA:  CMP:  Recent Labs     04/24/23  0630 04/25/23  0451 04/26/23  0454 04/27/23  0500 07/19/23  2308 07/31/23  1731 09/04/23  0132 09/05/23  0544 09/06/23  0553 09/07/23  0532 09/08/23  0631 09/09/23  2011 09/10/23  1957 09/11/23  1713 09/12/23  1816 09/14/23  0255 09/23/23  1815 09/24/23  0448 09/24/23  1430 09/25/23  0137 09/25/23  1404 09/26/23  0330 09/27/23  0413 10/10/23  1910     --  132*   < > 140   < > 138 134* 138 134*   < > 134*   < > 136 137 CANCELED  CANCELED 138 141 138 136 138 140 140 140   K 3.5  --  3.3*   < > 4.2   < > 4.0 3.8 4.7 4.5   < > 4.9   < > 4.2 4.5 CANCELED  CANCELED 3.8 3.5 3.9 3.7 4.3 4.0 4.4 3.8   CL 97  --  90*   < > 105   < > 99 97* 104 100   < > 97*   < > 100 104 CANCELED  CANCELED 101 100 99 99 100 102 105 100   CO2 21*  --  24   < > 23*   < > 27 26 24 21   < > 27   < > 24 24 CANCELED  CANCELED 28 28 29 30 29 29 28 29   ANIONGAP 19  --  18   < > 12   < > 16 15 15 18   < > 15   < > 16 14 CANCELED  CANCELED 13 17 14 11 13 13 11 15   BUN 27*  --  35*   < > 13   < > 11 18 19 18   < > 16   < > 15 18 CANCELED  CANCELED 16 22 20 21 17 17 15 16   CREATININE 1.1 1.1 0.9   < > 1.1   < > 0.99 1.67* 1.10 1.08   < > 0.92   < > 0.80 0.85 CANCELED  CANCELED 1.06 1.11 1.26 0.99 0.95 0.96 0.83 1.01   EGFR 90 90 115  --  90  --   --   --   --   --   --   --   --   --   --   --    --   --   --   --   --   --   --  >90   MG 2.0  --   --    < >  --    < > 2.14 2.21 2.24 2.07  --  2.26  --  2.12  --   --   --  2.10  --   --   --  2.32 2.10 2.08    < > = values in this interval not displayed.     Recent Labs     02/18/21  2245 02/19/21  1725 09/09/21  1334 09/10/21  2045 10/02/21  1738 10/03/21  0625 10/10/21  0354 10/12/21  0018 10/14/21  0834 11/03/21  2241 11/05/21  0907 07/15/23  1814 07/17/23  1543 07/19/23  2308 07/31/23  1731 08/12/23  1602 08/29/23  1846 08/30/23  0337 09/23/23  1815 09/24/23  0448 09/24/23  1430 09/25/23  0137 09/25/23  1404 09/26/23  0330 09/27/23  0413 10/10/23  1910   ALBUMIN 4.3   < > 4.7   < >  --    < > 4.3 4.4   < > 4.5   < > 4.4 4.4 4.6 4.7 4.8 4.5   < > 4.3 4.4 4.3 3.8 4.0 4.1 3.6 4.6   ALT 54*   < > 25   < >  --    < > 20 56*   < > 19   < > 55* 50 44* 33 86* 54*  --  86* 87*  --   --   --   --   --  50   AST 40*   < > 25   < >  --    < > 19 45*   < > 18   < > 39 26 27 20 42* 29  --  49* 34  --   --   --   --   --  28   BILITOT 1.0   < > 0.6   < >  --    < > 1.3* 1.0   < > 2.3*   < > 0.4 0.5 0.4 0.5 0.7 0.6  --  0.4 0.3  --   --   --   --   --  0.5   LIPASE 40  --  90*  --  105*  --  23 54  --  23  --  42  --   --  43  --   --   --   --   --   --   --   --   --   --   --     < > = values in this interval not displayed.     CBC:  Recent Labs     09/12/23  1816 09/14/23  0255 09/23/23  1815 09/24/23  0448 09/25/23  0137 09/26/23  0330 09/27/23  0413 10/10/23  1910   WBC 3.8* CANCELED  CANCELED 5.3 7.0 4.6 5.4 4.9 6.9   HGB 12.6* CANCELED  CANCELED 12.8* 12.8* 11.1* 12.1* 10.7* 12.9*   HCT 40.8* CANCELED  CANCELED 37.4* 38.9* 34.7* 35.7* 33.4* 40.2*    CANCELED  CANCELED 278 298 256 288 268 362   MCV 84 CANCELED  CANCELED 77* 79* 79* 78* 79* 79*     COAG:   Recent Labs     11/10/22  1614 01/01/23  0613 07/12/23  1701 07/17/23  1543 08/29/23  1846 09/23/23 1815 09/24/23 0448 10/10/23  1910   INR 1.1 1.1 1.2* 1.1 1.1 1.0 1.0 1.1     ABO: No results for  "input(s): \"ABO\" in the last 44171 hours.  HEME/ENDO:  Recent Labs     06/13/23  0537 08/29/23  1846 10/10/23  1910   FERRITIN  --  239 189   IRONSAT  --  22* 23*   TSH  --  2.05 2.46   HGBA1C 8.5* 6.2*  --       CARDIAC:   Recent Labs     03/04/21  1835 03/08/21  1001 05/26/21  1440 06/03/21  1243 06/19/21  0657 06/27/21  0328 08/03/21  1336 08/05/21  0947 08/16/21  1756 07/07/23  1546 07/12/23  1701 07/12/23  1739 07/15/23  1814 07/15/23  1920 07/17/23  1543 07/17/23  1640 08/13/23  0429 08/29/23  1846 08/30/23  0339 09/05/23  1557 09/23/23  1815 09/23/23  1902 09/23/23 2056 09/25/23  1054 10/10/23  1910    116  --  331*  --  150  --  168  --   --   --   --   --   --   --   --   --   --   --   --   --   --   --   --   --    TROPHS  --   --   --   --   --   --   --   --    < > 17  --    < > 11   < > 8   < > CANCELED CANCELED 14 10 38 40 47  --  30   BNP  --   --    < >  --    < >  --    < >  --    < > 768* 161*  --  400*  --  405*  --   --  204*  --   --  119*  --   --  CANCELED 176*    < > = values in this interval not displayed.     Recent Labs     09/02/23  1745 09/03/23  0012 09/03/23  0501 09/03/23  1058 09/04/23  2225 09/05/23  0534 09/05/23  1749 09/05/23  2346 09/06/23  0546 09/06/23  1119 09/25/23 1836   LACMX 0.6 0.9 0.8  --  1.1  --   --   --   --   --  0.6   SO2MV 54 72 72   < > 69   < > 66 73 76 69 63   O2CMX 53.2 70.5 70.8   < > 67.5   < > 64.8 71.1 74.4 67.5 61.9    < > = values in this interval not displayed.     No results for input(s): \"TACROLIMUS\", \"CYCLOSPORINE\" in the last 64941 hours.    Prior to Admission Meds:  Medications Prior to Admission   Medication Sig Dispense Refill Last Dose    ALPRAZolam XR (Xanax XR) 2 mg 24 hr tablet PLEASE SEE ATTACHED FOR DETAILED DIRECTIONS       apixaban (Eliquis) 5 mg tablet Take 1 tablet (5 mg) by mouth twice a day.       atorvastatin (Lipitor) 40 mg tablet Take 1 tablet (40 mg) by mouth once daily at bedtime.       blood sugar diagnostic strip USE " TO TEST BEFORE MEALS AND AT BEDTIME 100 each 1     DULoxetine (Cymbalta) 30 mg DR capsule Take 1 capsule (30 mg) by mouth 2 times a day.       empagliflozin (Jardiance) 10 mg Take 1 tablet (10 mg) by mouth once daily.       ezetimibe (Zetia) 10 mg tablet Take 1 tablet (10 mg) by mouth once daily.       gabapentin (Neurontin) 100 mg capsule TAKE 2 CAPSULES BY MOUTH THREE TIMES A DAY. 180 capsule 3     hydrOXYzine HCL (Atarax) 50 mg tablet Take 2 tablets (100 mg) by mouth once daily at bedtime.       insulin lispro (HumaLOG) 100 unit/mL injection INJECT THREE TIMES A DAY VIA ATTACHED SLIDING SCALE 15 mL 1     ipratropium-albuteroL (Duo-Neb) 0.5-2.5 mg/3 mL nebulizer solution Ipratropium-Albuterol 0.5-2.5 (3) MG/3ML Inhalation Solution  Quantity: 180   Refills: 0  Start: 2-Dec-2022       Klor-Con M20 20 mEq ER tablet Take 2 tablets (40 mEq) by mouth once daily.       lisinopril 5 mg tablet 1 tab(s) orally once a day (in the morning)       magnesium oxide (Mag-Ox) 400 mg (241.3 mg magnesium) tablet Take 1 tablet (400 mg) by mouth 2 times a day.       metoprolol succinate XL (Toprol-XL) 25 mg 24 hr tablet Take 1 tablet (25 mg) by mouth once daily.       milrinone in 5 % dextrose (Primacor) 40 mg/200 mL (200 mcg/mL) infusion 0.25 mcg/kg/min intravenous continuously       QUEtiapine (SEROquel) 200 mg tablet Take 1 tablet (200 mg) by mouth once daily at bedtime.       rivaroxaban (Xarelto) 20 mg tablet 1 tab(s) orally  - Daily 1800       rOPINIRole (Requip) 2 mg tablet Take 1 tablet (2 mg) by mouth once daily.       sacubitriL-valsartan (Entresto) 24-26 mg tablet Take 1 tablet by mouth twice a day.       spironolactone (Aldactone) 25 mg tablet Take 1 tablet (25 mg) by mouth once daily.       torsemide (Demadex) 100 mg tablet 1 tab(s) orally once a day (in the morning), As Needed for weight gain more than 2lb in 1 day or5 lb in one week       traZODone (Desyrel) 150 mg tablet Take 1 tablet (150 mg) by mouth once daily.           NUTRITION: NPO Diet; Effective now  EMERGENCY CONTACT: Extended Emergency Contact Information  Primary Emergency Contact: Cathy Gallo  Home Phone: [phone]  Relation: Parent  Secondary Emergency Contact: Fabiana Gallo  Home Phone: [phone]  Relation: None  CODE STATUS: Full Code  FOLLOWUP:   Future Appointments   Date Time Provider Department Center   10/23/2023  3:30 PM DO Jessica [id number] [facility]

## 2023-10-11 NOTE — PROCEDURES
Procedures      A time out was preformed. The patients right neck region was prepped and draped in a sterile fashion using chlorhexidine scrub. Anesthia was achieved with 1% lidocaine. The right internal jugular vein was accessed using a micropuncture needle and sheath. Venous blood was withdrawn and the sheath was advanced into the vein and the needle was withdrawn. A larger guidewire was advanced through the sheath. A small incision was made with a 10 blade scalpel and the sheath was exchanged for a dilator with overlying Austrian #8 catheter over the guidewire until appropriate dilation was obtained. Dilator was flushed appropriately, taking care to first withdraw any free air in system. We manipulated a [place] catheter through the venous system, to the pulmonary capillary wedge position monitoring for appropriate RA and RV wave-forms. No ectopy was noted on the monitor. A Wedge pressure was obtained. We reviewed the data and felt that it was adequate. We cleaned and dressed the access site. Post procedure chest x-ray was ordered and will be reviewed for correct placement and signs of pneumothorax. Patient tolerated the procedure well.      [place] was locked at: 56 cm     Opening swan numbers pending     Dr. Jean present during entire procedure    Renaldo Dunlap PA-C  5:04 PM

## 2023-10-11 NOTE — PROGRESS NOTES
10/11/23 1342   Discharge Planning   Living Arrangements Alone   Support Systems Parent   Assistance Needed independent   Type of Residence Private residence   Home or Post Acute Services In home services   Patient expects to be discharged to: [facility] home infusion return?   Financial Resource Strain   How hard is it for you to pay for the very basics like food, housing, medical care, and heating? Not hard   Housing Stability   In the last 12 months, was there a time when you were not able to pay the mortgage or rent on time? N   In the last 12 months, how many places have you lived? 1   In the last 12 months, was there a time when you did not have a steady place to sleep or slept in a shelter (including now)? N   Transportation Needs   In the past 12 months, has lack of transportation kept you from meetings, work, or from getting things needed for daily living? No   Patient Choice   Provider Choice list and CMS website (https://medicare.gov/care-compare#search) for post-acute Quality and Resource Measure Data were provided and reviewed with:   (PT/OT evaluation amairani)         SW met with patient at bedside. Patient was recently discharged with [facility] Home infusion for Milrinone. He was agreeable to returning to this assuming it is recommended. Currently denied need for social work. Social work to follow.  Telma Pineda, MERLY, LISW-S ([id number])

## 2023-10-11 NOTE — PROGRESS NOTES
Stephan Gallo is a 33 y.o. male on day 1 of admission presenting with Acute decompensated heart failure (CMS/HCC).    Subjective     New admission yesterday. No acute events O/N. Complains of dyspnea on exertion and generalized pain. Denies nausea, vomiting and abdominal pain. Tolerated CPAP O/N, currently on RA this morning. Remains HDS supported on milrinone.     Plan:  - Plan for SGC today  - Diuresis as needed pending swan numbers  - C/w milrinone 0.25 mcg/kg/min  - Chronic Pain consult       Objective     Physical Exam  Constitutional:       General: He is not in acute distress.     Appearance: He is obese. He is ill-appearing.   Neck:      Vascular: No hepatojugular reflux or JVD.      Comments: Enrrique present CDI  Cardiovascular:      Rate and Rhythm: Normal rate and regular rhythm.      Pulses: Normal pulses.      Heart sounds: S1 normal and S2 normal.   Pulmonary:      Effort: Pulmonary effort is normal.      Breath sounds: Normal breath sounds. No decreased breath sounds, wheezing, rhonchi or rales.   Abdominal:      General: Bowel sounds are normal. There is distension.      Palpations: Abdomen is soft. There is no hepatomegaly or splenomegaly.      Tenderness: There is no abdominal tenderness.   Genitourinary:     Comments: Voiding freely   Musculoskeletal:      Cervical back: Normal range of motion.      Right lower leg: No edema.      Left lower leg: No edema.   Neurological:      General: No focal deficit present.      Mental Status: He is alert and oriented to person, place, and time. Mental status is at baseline.       Last Recorded Vitals  Blood pressure (!) 120/98, pulse 110, temperature 35 °C (95 °F), resp. rate (!) 27, weight 140 kg (308 lb 10.3 oz), SpO2 93 %.       Intake/Output last 3 Shifts:  I/O last 3 completed shifts:  In: 342.2 (2.4 mL/kg) [I.V.:342.2 (2.4 mL/kg)]  Out: 300 (2.1 mL/kg) [Urine:300 (0.1 mL/kg/hr)]  Weight: 140 kg     Relevant Results  Results for orders placed or  performed during the hospital encounter of 10/10/23 (from the past 24 hour(s))   B-type natriuretic peptide   Result Value Ref Range     (H) 0 - 99 pg/mL   CBC   Result Value Ref Range    WBC 6.9 4.4 - 11.3 x10*3/uL    nRBC 0.0 0.0 - 0.0 /100 WBCs    RBC 5.11 4.50 - 5.90 x10*6/uL    Hemoglobin 12.9 (L) 13.5 - 17.5 g/dL    Hematocrit 40.2 (L) 41.0 - 52.0 %    MCV 79 (L) 80 - 100 fL    MCH 25.2 (L) 26.0 - 34.0 pg    MCHC 32.1 32.0 - 36.0 g/dL    RDW 15.3 (H) 11.5 - 14.5 %    Platelets 362 150 - 450 x10*3/uL    MPV 9.4 7.5 - 11.5 fL   TSH   Result Value Ref Range    Thyroid Stimulating Hormone 2.46 0.44 - 3.98 mIU/L   Coagulation Screen   Result Value Ref Range    Protime 12.5 9.8 - 12.8 seconds    INR 1.1 0.9 - 1.1    aPTT 31 27 - 38 seconds   Comprehensive Metabolic Panel   Result Value Ref Range    Glucose 137 (H) 74 - 99 mg/dL    Sodium 140 136 - 145 mmol/L    Potassium 3.8 3.5 - 5.3 mmol/L    Chloride 100 98 - 107 mmol/L    Bicarbonate 29 21 - 32 mmol/L    Anion Gap 15 10 - 20 mmol/L    Urea Nitrogen 16 6 - 23 mg/dL    Creatinine 1.01 0.50 - 1.30 mg/dL    eGFR >90 >60 mL/min/1.73m*2    Calcium 10.8 (H) 8.6 - 10.6 mg/dL    Albumin 4.6 3.4 - 5.0 g/dL    Alkaline Phosphatase 85 33 - 120 U/L    Total Protein 7.5 6.4 - 8.2 g/dL    AST 28 9 - 39 U/L    Bilirubin, Total 0.5 0.0 - 1.2 mg/dL    ALT 50 10 - 52 U/L   Magnesium   Result Value Ref Range    Magnesium 2.08 1.60 - 2.40 mg/dL   Iron and TIBC   Result Value Ref Range    Iron 93 35 - 150 ug/dL    UIBC 316 110 - 370 ug/dL    TIBC 409 240 - 445 ug/dL    % Saturation 23 (L) 25 - 45 %   Folate   Result Value Ref Range    Folate, Serum 11.8 >5.0 ng/mL   Ferritin   Result Value Ref Range    Ferritin 189 20 - 300 ng/mL   Vitamin B12   Result Value Ref Range    Vitamin B12 367 211 - 911 pg/mL   Heparin Assay, UFH   Result Value Ref Range    Heparin Unfractionated <0.1 See Comment Below for Therapeutic Ranges IU/mL   Troponin I, High Sensitivity   Result Value Ref  Range    Troponin I, High Sensitivity 30 0 - 53 ng/L   Phosphorus   Result Value Ref Range    Phosphorus 4.3 2.5 - 4.9 mg/dL   Drug Screen, Urine   Result Value Ref Range    Amphetamine Screen, Urine Presumptive Negative Presumptive Negative    Barbiturate Screen, Urine Presumptive Negative Presumptive Negative    Benzodiazepines Screen, Urine Presumptive Positive (A) Presumptive Negative    Cannabinoid Screen, Urine Presumptive Negative Presumptive Negative    Cocaine Metabolite Screen, Urine Presumptive Negative Presumptive Negative    Fentanyl Screen, Urine Presumptive Negative Presumptive Negative    Opiate Screen, Urine Presumptive Negative Presumptive Negative    Oxycodone Screen, Urine Presumptive Negative Presumptive Negative    PCP Screen, Urine Presumptive Negative Presumptive Negative   POCT GLUCOSE   Result Value Ref Range    POCT Glucose 155 (H) 74 - 99 mg/dL   Heparin Assay, UFH   Result Value Ref Range    Heparin Unfractionated 0.2 See Comment Below for Therapeutic Ranges IU/mL   Heparin Assay, UFH   Result Value Ref Range    Heparin Unfractionated 0.8 See Comment Below for Therapeutic Ranges IU/mL   Renal function panel   Result Value Ref Range    Glucose 174 (H) 74 - 99 mg/dL    Sodium 139 136 - 145 mmol/L    Potassium 3.4 (L) 3.5 - 5.3 mmol/L    Chloride 100 98 - 107 mmol/L    Bicarbonate 28 21 - 32 mmol/L    Anion Gap 14 10 - 20 mmol/L    Urea Nitrogen 18 6 - 23 mg/dL    Creatinine 0.91 0.50 - 1.30 mg/dL    eGFR >90 >60 mL/min/1.73m*2    Calcium 9.8 8.6 - 10.6 mg/dL    Phosphorus 5.1 (H) 2.5 - 4.9 mg/dL    Albumin 4.3 3.4 - 5.0 g/dL   Magnesium   Result Value Ref Range    Magnesium 1.92 1.60 - 2.40 mg/dL   CBC   Result Value Ref Range    WBC 5.1 4.4 - 11.3 x10*3/uL    nRBC 0.0 0.0 - 0.0 /100 WBCs    RBC 4.68 4.50 - 5.90 x10*6/uL    Hemoglobin 11.8 (L) 13.5 - 17.5 g/dL    Hematocrit 37.6 (L) 41.0 - 52.0 %    MCV 80 80 - 100 fL    MCH 25.2 (L) 26.0 - 34.0 pg    MCHC 31.4 (L) 32.0 - 36.0 g/dL    RDW  15.4 (H) 11.5 - 14.5 %    Platelets 276 150 - 450 x10*3/uL    MPV 10.0 7.5 - 11.5 fL   POCT GLUCOSE   Result Value Ref Range    POCT Glucose 165 (H) 74 - 99 mg/dL   Heparin Assay, UFH   Result Value Ref Range    Heparin Unfractionated 0.6 See Comment Below for Therapeutic Ranges IU/mL        Assessment & Plan:  Stephan Gallo is a 34 y/o M with PMHx significant for stage D, NYHA class IV HFrEF (10%) with RV dysfunction s/p ICD (6/2020) now with subcutaneous ICD 2/2 infection, nonischemic cardiomyopathy (2018 2/2 hydrocarbon intoxication), moderate to severe TR, COPD, DAVONTE (not on CPAP), DM2, Anxiety, Depression, PTSD, ADHD, nicotine abuse (current smoker), ETOH abuse, MSSA, GERD, LV thrombus, and PE (on xarelto) who presented to his outpatient clinic visit with Dr. Reeves where he admitted to dyspnea on exertion and significant chest pressure. He was admitted to HFICU 10/10 for further management of Acute Decompensated Heart Failure and potential advanced therapy workup.     Neuro:  #ADHD  #Depression with history of suicidal ideations   #Chronic pain   #Restless Leg Syndrome   -C/w home ropinirole 2 mg at bedtime   -C/w home quetiapine fumarate 200 mg at bedtime   -C/w home cymbalta 30 mg BID   -Holding home gabapentin 200 TID, patient states it does not help   -C/w home xanax 2 mg TID PRN for anxiety  -C/w home trazodone 150 mg at night  -Hydroxyzine 25 mg every 6 hours PRN  -Tylenol for pain PRN  -Chronic Pain consult   -Serial neuro and pain assessments   -PT/OT Consult, OOB to chair  -CAM ICU score every shift  -Sleep/wake cycle normalization     #Physical Status  -Morbid Obesity- BMI 46     #Substance abuse  History of ETOH abuse (quit in 2020)  Tobacco use/Nicotine Dependence (smokes 1-2 cigarettes daily)  Former cocaine user (quit in his 20s)  Hydrocarbon abuse (2020)  -Tox screen (10/10) positive for benzodiazepines, but patient is prescribed xanax   -Send nicotine screen      Cardiovascular:  #NYHA  Class IV, Stage D systolic heart failure NICM/HFrEF (15-20%)  #S/p ICD (2020) c/b infection/ endocarditis replaced with subcutaneous ICD (2022)  Echo (8/30/23): LVEF 15-20%, LV severely dilated. Global hypokinesis of the LV. Moderately reduced RV function. LA mild-moderately dilated. RA is moderately to severely dilated. Moderately elevated PAP.  Closing SGC # (9/5/23): BP 93/57 (62), CVP 6, PAP 43/23 (29), PAWP 13, CO/CI 7.0/2.87, , SVO2 71% on Dobutamine 5mcg/kg/min, Entresto 97/103, Empa 10, [place] 50   Advance therapy work-up done previously, but was declined due to social an substance abuse hx  Admit weight 139 kg   Daily weight (10/11): 140 kg  Admit   -C/w home spironolactone 25 mg daily   -C/w home milrinone 0.25 mcg/kg/min   -C/w home entresto 24/26 mg BID  -C/w empagliflozin 10 mg daily   -Holding home metoprolol succinate 25 mg daily  -Takes torsemide 100 mg daily, assess diuresis need after SGC placed   -Plan for SGC today   -Daily standing weights, regular diet, 2L fluid restriction, strict I&Os     #Chest Pain  EKG (10/10/23) sinus tachycardia   -Troponin (10/10) 30     #Nonobstructive CAD   C (2020) at [place] negative     #HLD  Lipid panel (8/29/23): cholesterol 238, HDL 32, , VLDL 75,   -C/w home ezetimibe 10 mg daily   -C/w home atorvastatin 40 mg at night      #No history of Arrhythmias  -Initially, ICD placed in 2020. Removed due to infection.  -Subcutaneous ICD in place for primary prevention      #Electrolyte Disturbances  -Keep K>4 and Mag>2     Pulmonary:   #Hx of PE (on Xarelto at home)  #DAVONTE (not on CPAP)  CTA (9/22/23) negative for PE  -C/w heparin gtt, holding home xarelto   -Monitor and maintain SpO2 > 92%  -CPAP at bedtime     GI:  #Nausea  -Bowel regimen   -Zofran PRN      :  #No history of CKD  -Baseline BUN/Cr ~15/0.9  -I/Os  -Avoid hypotension and nephrotoxic agents     #Hx of Epididymal Cystic lesion  US Scrotum w Doppler (3/28/23) A large complex  cystic lesion in the left epididymal head is slightly smaller compared to prior imaging with a new echogenic component without internal vascularity. Recommend follow-up as clinically indicated  -Continue to monitor      Heme:  #No active issues   Iron studies (10/10): iron 96, TIBC 409, %sat 23, folate 11.8, ferritin 189, vitamin B12 367     Endo:  #DM2  HgbA1c (8/29) 6.2  -Starting SSI     #Thyroid  TSH (10/10): 2.46       ID:  -Afebrile, nontoxic, no s/s infx  -Trend temps q4h     Feeding/fluids: Regular  Thromboprophylaxis: SCDs, heparin gtt  VAP bundle: n/a  Ulcer prophylaxis: n/a  Glycemic control: Sliding scale   Bowel care: Colace & miralax PRN  Indwelling catheter: None     PT/OT: following      Code Status: Full Code  Access: PIVs + FABIAN      Family Primary Contact/Next of Kin: Cathy Gallo (mother) [phone]    A. -G. reviewed     Dispo: Remain in HFICU     Patient seen and assessed with Dr. Ted Dunlap PA-C

## 2023-10-12 NOTE — CONSULTS
Reason For Consult  Chronic pain management is consulted for recommendations for pain control, specifically after discharge.    History Of Present Illness  Stephan Gallo is a 33 y.o. male presenting with chronic chest wall pain which at times radiates to his back. He rates this 9/10 during my visit. He also endorses generalized body pain. He denies nausea/vomiting.   He is admitted for acute decompensated heart failure. Information is obtained through chart review and patient interview and physical exam.   He reports he has encountered difficulties establishing care with chronic pain management due to insurance issues.     Past Medical History  He has a past medical history of Alcohol use disorder, severe, dependence (CMS/[facility]) (10/10/2023), CHF (congestive heart failure) (CMS/[facility]), Congenital hypertrophic pyloric stenosis, COPD (chronic obstructive pulmonary disease) (CMS/[facility]), Diabetes mellitus (CMS/[facility]), Drug abuse (CMS/[facility]), Esophagitis, [place] grade D (10/10/2023), ETOH abuse, GERD (gastroesophageal reflux disease), History of DVT (deep vein thrombosis) (11/17/2020), History of pulmonary embolism (10/10/2023), History of repair of pyloric stenosis (04/09/2021), History of suicide attempt (04/11/2021), Inhalant use disorder, moderate, in sustained remission (CMS/[facility]) (10/10/2023), Left ventricular apical thrombus (04/09/2020), DAVONTE (obstructive sleep apnea), Polysubstance dependence (CMS/[facility]) (04/10/2020), PUD (peptic ulcer disease) (10/10/2023), RLS (restless legs syndrome) (04/28/2022), Tachycardia (10/10/2023), Tobacco dependence syndrome (10/10/2023), and Vitamin D deficiency (10/10/2023).    Surgical History  He has a past surgical history that includes Other surgical history (02/15/2021); Other surgical history (02/15/2021); Other surgical history (04/22/2021); Other surgical history (04/22/2021); CT angio abdomen pelvis w and or wo IV IV contrast (12/13/2020); CT angio abdomen pelvis w and or  wo IV IV contrast (02/07/2021); US guided aspiration injection major joint (06/08/2021); FL guided abscess fluid collection drainage (06/27/2021); CT angio neck w and wo IV contrast (07/07/2022); and Cardiac defibrillator placement.     Social History  He reports that he has been smoking cigarettes. He has been smoking an average of .5 packs per day. He does not have any smokeless tobacco history on file. He reports that he does not currently use alcohol. He reports that he does not currently use drugs after having used the following drugs: Solvent inhalants and Cocaine.    Family History  Family History   Problem Relation Name Age of Onset    Diabetes Father      Colon cancer Father          Allergies  Adhesive, Adhesive tape-silicones, Bee venom protein (honey bee), Insect venom, and Wasp venom    Review of Systems  Review of Systems   Constitutional: Negative.    Eyes: Negative.    Respiratory:  Positive for chest tightness.    Cardiovascular:  Positive for chest pain.   Gastrointestinal: Negative.    Musculoskeletal:  Positive for arthralgias, back pain and neck pain.   Skin: Negative.    Allergic/Immunologic: Positive for environmental allergies.   Neurological: Negative.    Psychiatric/Behavioral: Negative.  Behavioral problem: .lab.         Physical Exam  Constitutional: Alert and oriented x 3, no acute distress  Head and Face: Head is normocephalic and non traumatic. No facial abnormalities, asymmetry, or rashes.   Neck: Supple and symmetric. Full ROM.   Respiratory: Good chest expansion. No gasping or SOB. No use of accessory muscles.  Cardiovascular: Normal S1 & S2, Pulses regular.   : Deferred  Musculoskeletal: Digits/nails show no clubbing or cyanosis. Intact ROM. No joint swelling.   Neurologic: Cranial nerves II-XII intact. Intact senses and motor response.   Psychiatric: Alert and oriented x3. Mood and affect are normal. Recent/remote memory as evidenced through face-to-face interaction and  discussion appear grossly intact.    Skin: Warm and dry, no lesions, no rashes on visible skin     Last Recorded Vitals  Blood pressure 89/53, pulse 93, temperature 35.1 °C (95.2 °F), resp. rate 20, weight 140 kg (308 lb 10.3 oz), SpO2 100 %.    Relevant Results  Labs and imaging reviewed.     Assessment/Plan   virginia Gallo is a 33 y.o. male presenting with chronic chest wall pain which at times radiates to his back. He rates this 9/10 during my visit. He also endorses generalized body pain. He denies nausea/vomiting.   He is admitted for acute decompensated heart failure. Information is obtained through chart review and patient interview and physical exam.   He reports he has encountered difficulties establishing care with chronic pain management due to insurance issues. We discussed the importance and benefit to him to establish care with a chronic pain management doctor after discharge.     Recommendations:  Prioritize PO pain medication.  2. Continue tylenol, Cymbalta as currently prescribed.  3. Oxycodone 5 mg PO every 4 hours prn for short term use only  4. Consider   4. Referral to chronic pain management to establish long tem care plan after discharge.    Thank you for the opportunity to care for this pt. In consult. Chronic pain will sign off. Please page with further concerns.  #[id number]    I spent 60 minutes in the professional and overall care of this patient.      Chelo Marin, SATHYA-CNP

## 2023-10-12 NOTE — PROGRESS NOTES
Stephan Gallo is a 33 y.o. male on day 2 of admission presenting with Acute decompensated heart failure (CMS/HCC).    Subjective     Briefly hypotensive O/N with nipride. Porter placed yesterday that was consistent with low output state on his home milrinone dose of 0.25 mcg/kg/min. This morning the patient is resting comfortably in bed on his CPAP machine. Complains of SOB and generalized pain still. Denies nausea, vomiting and abdominal pain. Remains HDS supported on milrinone.     Plan:  - Decrease lasix gtt to 5 mg/hr  - C/w milrinone at 0.375 mcg/kg/min  - Attempt to wean off nipride gtt by adding hydralazine 25 mg q8  - CTM hemodynamics q6  - Send advanced therapies email today       Objective     Physical Exam  Constitutional:       General: He is not in acute distress.     Appearance: He is obese. He is ill-appearing.   Neck:      Vascular: No hepatojugular reflux or JVD.      Comments: Enrrique present CDI. RIJ Introducer and SGC present   Cardiovascular:      Rate and Rhythm: Normal rate and regular rhythm.      Pulses: Normal pulses.      Heart sounds: S1 normal and S2 normal.   Pulmonary:      Effort: Pulmonary effort is normal.      Breath sounds: Normal breath sounds. No decreased breath sounds, wheezing, rhonchi or rales.   Abdominal:      General: Bowel sounds are normal. There is distension.      Palpations: Abdomen is soft. There is no hepatomegaly or splenomegaly.      Tenderness: There is no abdominal tenderness.   Genitourinary:     Comments: Voiding freely   Musculoskeletal:      Cervical back: Normal range of motion.      Right lower leg: No edema.      Left lower leg: No edema.   Neurological:      General: No focal deficit present.      Mental Status: He is alert and oriented to person, place, and time. Mental status is at baseline.       Last Recorded Vitals  Blood pressure 88/67, pulse 97, temperature 35.5 °C (95.9 °F), resp. rate 16, weight 140 kg (308 lb 10.3 oz), SpO2 97 %.  PAP:  (34-67)/(20-42) 39/20  CVP:  [8 mmHg-21 mmHg] 9 mmHg  PCWP:  [16 mmHg-40 mmHg] 16 mmHg  CO:  [3.9 L/min-7.1 L/min] 5.4 L/min  CI:  [1.5 L/min/m2-2.9 L/min/m2] 2.2 L/min/m2  SVR: 618  SVO2: 72%    Intake/Output last 3 Shifts:  I/O last 3 completed shifts:  In: 1451.3 (10.4 mL/kg) [I.V.:1351.3 (9.7 mL/kg); IV Piggyback:100]  Out: 2700 (19.3 mL/kg) [Urine:2700 (0.5 mL/kg/hr)]  Weight: 140 kg     Relevant Results  Results for orders placed or performed during the hospital encounter of 10/10/23 (from the past 24 hour(s))   BLOOD GAS MIXED VENOUS FULL PANEL   Result Value Ref Range    POCT pH, Mixed 7.32 (L) 7.33 - 7.43 pH    POCT pCO2, Mixed 58 (H) 41 - 51 mm Hg    POCT pO2, Mixed 37 35 - 45 mm Hg    POCT SO2, Mixed 46 45 - 75 %    POCT Oxy Hemoglobin, Mixed 45.5 45.0 - 75.0 %    POCT Hematocrit Calculated, Mixed 40.0 (L) 41.0 - 52.0 %    POCT Sodium, Mixed 135 (L) 136 - 145 mmol/L    POCT Potassium, Mixed 4.1 3.5 - 5.3 mmol/L    POCT Chloride, Mixed 102 98 - 107 mmol/L    POCT Ionized Calcium, Mixed 1.24 1.10 - 1.33 mmol/L    POCT Glucose, Mixed 162 (H) 74 - 99 mg/dL    POCT Lactate, Mixed 1.2 0.4 - 2.0 mmol/L    POCT Base Excess, Mixed 2.4 -2.0 - 3.0 mmol/L    POCT HCO3 Calculated, Mixed 29.9 (H) 22.0 - 26.0 mmol/L    POCT Hemoglobin, Mixed 13.4 (L) 13.5 - 17.5 g/dL    POCT Anion Gap, Mixed 7 (L) 10 - 25 mmo/L    Patient Temperature 37.0 degrees Celsius    FiO2 0 %   BLOOD GAS MIXED VENOUS   Result Value Ref Range    POCT pH, Mixed 7.31 (L) 7.33 - 7.43 pH    POCT pCO2, Mixed 57 (H) 41 - 51 mm Hg    POCT pO2, Mixed 43 35 - 45 mm Hg    POCT SO2, Mixed 52 45 - 75 %    POCT Oxy Hemoglobin, Mixed 51.5 45.0 - 75.0 %    POCT Base Excess, Mixed 1.2 -2.0 - 3.0 mmol/L    POCT HCO3 Calculated, Mixed 28.7 (H) 22.0 - 26.0 mmol/L    Patient Temperature 37.0 degrees Celsius    FiO2 0 %   Heparin Assay, UFH   Result Value Ref Range    Heparin Unfractionated 0.3 See Comment Below for Therapeutic Ranges IU/mL   BLOOD GAS MIXED VENOUS    Result Value Ref Range    POCT pH, Mixed 7.33 7.33 - 7.43 pH    POCT pCO2, Mixed 58 (H) 41 - 51 mm Hg    POCT pO2, Mixed 44 35 - 45 mm Hg    POCT SO2, Mixed 60 45 - 75 %    POCT Oxy Hemoglobin, Mixed 58.4 45.0 - 75.0 %    POCT Base Excess, Mixed 3.2 (H) -2.0 - 3.0 mmol/L    POCT HCO3 Calculated, Mixed 30.6 (H) 22.0 - 26.0 mmol/L    Patient Temperature 37.0 degrees Celsius    FiO2 40 %   Heparin Assay, UFH   Result Value Ref Range    Heparin Unfractionated 0.4 See Comment Below for Therapeutic Ranges IU/mL   CBC   Result Value Ref Range    WBC 5.1 4.4 - 11.3 x10*3/uL    nRBC 0.0 0.0 - 0.0 /100 WBCs    RBC 4.52 4.50 - 5.90 x10*6/uL    Hemoglobin 11.3 (L) 13.5 - 17.5 g/dL    Hematocrit 34.9 (L) 41.0 - 52.0 %    MCV 77 (L) 80 - 100 fL    MCH 25.0 (L) 26.0 - 34.0 pg    MCHC 32.4 32.0 - 36.0 g/dL    RDW 15.2 (H) 11.5 - 14.5 %    Platelets 266 150 - 450 x10*3/uL    MPV 9.5 7.5 - 11.5 fL   Renal function panel   Result Value Ref Range    Glucose 159 (H) 74 - 99 mg/dL    Sodium 137 136 - 145 mmol/L    Potassium 3.7 3.5 - 5.3 mmol/L    Chloride 99 98 - 107 mmol/L    Bicarbonate 29 21 - 32 mmol/L    Anion Gap 13 10 - 20 mmol/L    Urea Nitrogen 17 6 - 23 mg/dL    Creatinine 1.01 0.50 - 1.30 mg/dL    eGFR >90 >60 mL/min/1.73m*2    Calcium 9.4 8.6 - 10.6 mg/dL    Phosphorus 5.5 (H) 2.5 - 4.9 mg/dL    Albumin 3.9 3.4 - 5.0 g/dL   Magnesium   Result Value Ref Range    Magnesium 2.10 1.60 - 2.40 mg/dL   BLOOD GAS MIXED VENOUS   Result Value Ref Range    POCT pH, Mixed 7.32 (L) 7.33 - 7.43 pH    POCT pCO2, Mixed 57 (H) 41 - 51 mm Hg    POCT pO2, Mixed 49 (H) 35 - 45 mm Hg    POCT SO2, Mixed 72 45 - 75 %    POCT Oxy Hemoglobin, Mixed 70.3 45.0 - 75.0 %    POCT Base Excess, Mixed 2.0 -2.0 - 3.0 mmol/L    POCT HCO3 Calculated, Mixed 29.4 (H) 22.0 - 26.0 mmol/L    Patient Temperature 37.0 degrees Celsius    FiO2 40 %   POCT GLUCOSE   Result Value Ref Range    POCT Glucose 157 (H) 74 - 99 mg/dL   BLOOD GAS MIXED VENOUS   Result Value  Ref Range    POCT pH, Mixed 7.37 7.33 - 7.43 pH    POCT pCO2, Mixed 55 (H) 41 - 51 mm Hg    POCT pO2, Mixed 40 35 - 45 mm Hg    POCT SO2, Mixed 58 45 - 75 %    POCT Oxy Hemoglobin, Mixed 56.7 45.0 - 75.0 %    POCT Base Excess, Mixed 5.0 (H) -2.0 - 3.0 mmol/L    POCT HCO3 Calculated, Mixed 31.8 (H) 22.0 - 26.0 mmol/L    Patient Temperature 37.0 degrees Celsius    FiO2 0 %        Assessment & Plan:  Stephan Gallo is a 34 y/o M with PMHx significant for stage D, NYHA class IV HFrEF (10%) with RV dysfunction s/p ICD (6/2020) now with subcutaneous ICD 2/2 infection, nonischemic cardiomyopathy (2018 2/2 hydrocarbon intoxication), moderate to severe TR, COPD, DAVONTE (not on CPAP), DM2, Anxiety, Depression, PTSD, ADHD, nicotine abuse (current smoker), ETOH abuse, MSSA, GERD, LV thrombus, and PE (on xarelto) who presented to his outpatient clinic visit with Dr. Reeves where he admitted to dyspnea on exertion and significant chest pressure. He was admitted to HFICU 10/10 for further management of Acute Decompensated Heart Failure. [place] numbers consistent with low output state on his home milrinone dose of 0.25. Milrinone increased to 0.375 mcg/kg/min and advanced therapies email sent 10/12.     Neuro:  #ADHD  #Depression with history of suicidal ideations   #Chronic pain   #Restless Leg Syndrome   -C/w home ropinirole 2 mg at bedtime   -C/w home quetiapine fumarate 200 mg at bedtime   -C/w home cymbalta 30 mg BID   -Holding home gabapentin 200 TID, patient states it does not help   -C/w home xanax 2 mg TID PRN for anxiety  -C/w home trazodone 150 mg at night  -Hydroxyzine 25 mg every 6 hours PRN  -Tylenol for pain PRN  -Chronic pain consult, ok to give PO oxycodone 5 mg q4 PRN for short term use only  -Will need chronic pain management to establish long term care plan after discharge   -Serial neuro and pain assessments   -PT/OT Consult, OOB to chair  -CAM ICU score every shift  -Sleep/wake cycle normalization     #Physical  Status  -Morbid Obesity- BMI 46     #Substance abuse  History of ETOH abuse (quit in 2020)  Tobacco use/Nicotine Dependence (smokes 1-2 cigarettes daily)  Former cocaine user (quit in his 20s)  Hydrocarbon abuse (2020)  -Tox screen (10/10) positive for benzodiazepines, but patient is prescribed xanax   -Nicotine screen pending      Cardiovascular:  #NYHA Class IV, Stage D systolic heart failure NICM/HFrEF (15-20%)  #S/p ICD (2020) c/b infection/ endocarditis replaced with subcutaneous ICD (2022)  Echo (8/30/23): LVEF 15-20%, LV severely dilated. Global hypokinesis of the LV. Moderately reduced RV function. LA mild-moderately dilated. RA is moderately to severely dilated. Moderately elevated PAP.  Closing SGC # (9/5/23): BP 93/57 (62), CVP 6, PAP 43/23 (29), PAWP 13, CO/CI 7.0/2.87, , SVO2 71% on Dobutamine 5mcg/kg/min, Entresto 97/103, Empa 10, Carlo 50   Daily SGC # (10/12): BP 84/53 (63) CVP 8, PAP 39/22, PCWP 17, CO/CI 5.5/2.2, , SvO2 72% on lasix 20 mg/hr, nipride 0.3, entresto 24/26 mg BID, empagliflozin 10 mg and spironolactone 25 mg   Advance therapy work-up done previously, but was declined due to social an substance abuse hx  Admit weight 139 kg   Daily weight (10/12): 140 kg  Admit   -C/w home spironolactone 25 mg daily   -C/w milrinone 0.375 mcg/kg/min   -C/w home entresto 24/26 mg BID  -C/w empagliflozin 10 mg daily   -Holding home metoprolol succinate 25 mg daily  -C/w nipride gtt, attempt to wean off with addition of hydralazine 25 mg q8  -Decrease lasix gtt to 5 mg/hr   -Hemodynamics q6  -Send advanced therapies workup 10/12  -Daily standing weights, regular diet, 2L fluid restriction, strict I&Os     #Chest Pain  EKG (10/10/23) sinus tachycardia   -Troponin (10/10) 30     #Nonobstructive CAD   [facility] (2020) at [place] negative     #HLD  Lipid panel (8/29/23): cholesterol 238, HDL 32, , VLDL 75,   -C/w home ezetimibe 10 mg daily   -C/w home atorvastatin 40 mg at night       #No history of Arrhythmias  -Initially, ICD placed in 2020. Removed due to infection.  -Subcutaneous ICD in place for primary prevention      #Electrolyte Disturbances  -Keep K>4 and Mag>2     Pulmonary:   #Hx of PE (on Xarelto at home)  #DAVONTE (not on CPAP)  CTA (9/22/23) negative for PE  -C/w heparin gtt, holding home xarelto   -Monitor and maintain SpO2 > 92%  -CPAP at bedtime     GI:  #Nausea  -Bowel regimen   -Zofran PRN      :  #No history of CKD  -Baseline BUN/Cr ~15/0.9  -I/Os  -Avoid hypotension and nephrotoxic agents     #Hx of Epididymal Cystic lesion  US Scrotum w Doppler (3/28/23) A large complex cystic lesion in the left epididymal head is slightly smaller compared to prior imaging with a new echogenic component without internal vascularity. Recommend follow-up as clinically indicated  -Continue to monitor      Heme:  #No active issues   Iron studies (10/10): iron 96, TIBC 409, %sat 23, folate 11.8, ferritin 189, vitamin B12 367     Endo:  #DM2  HgbA1c (8/29) 6.2  -SSI     #Thyroid  TSH (10/10): 2.46       ID:  -Afebrile, nontoxic, no s/s infx  -Trend temps q4h     Feeding/fluids: Regular  Thromboprophylaxis: SCDs, heparin gtt  VAP bundle: n/a  Ulcer prophylaxis: n/a  Glycemic control: Sliding scale   Bowel care: Colace & miralax PRN  Indwelling catheter: None     PT/OT: following      Code Status: Full Code  Access:  Rudy PERALTA  [facility] (10/11-*)     Family Primary Contact/Next of Kin: Cathy Gallo (mother) [phone]    A. -G. reviewed     Dispo: Remain in HFICU     Patient seen and assessed with Dr. Ted Dunlap PA-C

## 2023-10-12 NOTE — CARE PLAN
Problem: Pain  Goal: My pain/discomfort is manageable  Flowsheets (Taken 10/12/2023 0900)  Resident's pain/discomfort is manageable:   Offer non-pharmacological pain management interventions   Identify and avoid pain triggers

## 2023-10-12 NOTE — CARE PLAN
Problem: Balance  Goal: Score 25/28 on Tinetti  Outcome: Progressing     Problem: Mobility  Goal: Complete 6MWT, stable vitals, with RPD 3/10 or less, RPE 13/20 or less  Outcome: Progressing  Goal: amb x 350 ft, no rest breaks, with stable vitals , and LRD  Outcome: Progressing     Problem: Transfers  Goal: sit<>stand indep  Outcome: Progressing  Goal: 5x STS from chair, no UE assist, in 15 sec or less   Outcome: Progressing

## 2023-10-12 NOTE — PROGRESS NOTES
Topic: Pre Advanced Therapy Patient Education   Attendees: Patient Stephan Gallo, Patient mother Cathy Gallo, VAD Coordinator Pujadasha Diego     Patient given the [facility] Pre Advanced Therapy Education binder. Patient received education regarding the following topics for their pre Heart Transplant and pre Left Ventricular Assist Device evaluation:    The evaluation process including advanced therapy team members and roles, required testing and consults, selection criteria and suitability for OHT and LVAD, OHT listing process and organ offer, hands on LVAD equipment review, psychological and financial considerations for a successful outcome with advanced therapies, and patient responsibilities including adherence to a strict medical regimen.    An overview of the surgical procedure for OHT and LVAD.    The potential for certain risk factors including infection, pneumonia, blood clot formation, organ rejection, failure, and possibility of re transplantation, lifetime immunosuppression and associated risks with OHT, arrhythmias and cardiovascular collapse, multi-organ system failure, depression, post- traumatic stress disorder, anxiety, issues of dependence or feelings of guilt, right ventricular failure with LVAD, and death.   National and [facility]plant [facility] outcomes for one year patient and graft survival from the most recent SRTR program specific report.    Donor risk factors that could affect the success of the transplant and health of the patient including donor age, donor medical and social history, condition of the organ, and the risk of lacho cancer, HIV, Hepatitis B or C, and/or malaria if the infection is not detectable at the time of donation.    Transplants not performed in a Medicare-approved transplant center could affect the patients ability to have immunosuppression medication paid for under Medicare part B.    STS Intermacs and  LVAD implant outcomes for one year patient  survival.    The medical necessity of electricity and a working telephone with implantation of the LVAD.    Available alternatives to OHT and LVAD.    The patient's right to withdraw consent for the evaluations at any time during the process.     Patient was given the opportunity to have questions answered.   Consent signed for Heart Transplant and LVAD? : No   If no, which consent was signed? : VAD  Current barriers: Current Nicotine use & BMI  Consent obtained by: Puja Diego

## 2023-10-12 NOTE — PROGRESS NOTES
HFICU Attending Note    33 y.o. male followed by my colleague Dr. Reeves with stage D NICM/HFrEF (EF 15-20%, LVIDD 7.7, mod RV function) on palliative inotropes admitted with worsening functional status from outpatient clinic with concern for milrinone failure. Difficult psychosocial history but recent adherance/sobriety (though concern for ongoing psychiatric issues and desire for chronic narcotic therapy but no contract). [place] with deranged hemodynamics despite inotropic support. Merits LVAD evaluation with focus on RV and psychosocial concerns. He is not a current transplant candidate due to active smoking, prior substance use, history of suicidality, brief homocidality, obesity.     Continue milrinone  Initiate re-evaluation for LVAD  Ongoing SGT.    This critically ill patient continues to be at-risk for clinically significant deterioration / failure due to the above mentioned dysfunctional, unstable organ systems.  I have personally identified and managed all complex critical care issues to prevent aforementioned clinical deterioration.  Critical care time is spent at bedside and/or the immediate area and has included, but is not limited to, the review of diagnostic tests, labs, radiographs, serial assessments of hemodynamics, respiratory status, ventilatory management, and family updates.  Time spent in procedures and teaching are reported separately.    Critical care time: __35_ minutes     _________________________________________________  Isai Jean MD           Objective   Admit Date: 10/10/2023  Hospital Length of Stay: 2   ICU Length of Stay: 2d 3h     PROBLEMS:  Active Hospital Problems    *Acute decompensated heart failure (CMS/HCC)      Sinus tachycardia        MEDICATIONS  Infusions:  furosemide, Last Rate: 10 mg/hr (10/12/23 1800)  heparin, Last Rate: 1,900 Units/hr (10/12/23 1730)  lactated Ringer's, Last Rate: 10 mL/hr (10/12/23 1800)  milrinone, Last Rate: 0.375 mcg/kg/min (10/12/23  1730)      Scheduled:  atorvastatin, 40 mg, Nightly  DULoxetine, 30 mg, BID  empagliflozin, 10 mg, Daily  ezetimibe, 10 mg, Daily  hydrALAZINE, 25 mg, q8h  insulin lispro, 0-5 Units, TID with meals  polyethylene glycol, 17 g, Daily  QUEtiapine, 200 mg, Nightly  rOPINIRole, 2 mg, Daily  sacubitriL-valsartan, 1 tablet, BID  spironolactone, 25 mg, Daily      PRN:  acetaminophen, 975 mg, q6h PRN   Or  acetaminophen, 650 mg, q6h PRN   Or  acetaminophen, 650 mg, q6h PRN  ALPRAZolam, 2 mg, TID PRN  dextrose 10 % in water (D10W), 0.3 g/kg/hr, Once PRN  dextrose, 25 g, q15 min PRN  glucagon, 1 mg, q15 min PRN  heparin, 5,000-10,000 Units, q4h PRN  hydrOXYzine HCL, 25 mg, q6h PRN  ondansetron, 4 mg, q6h PRN  oxyCODONE, 5 mg, q4h PRN  oxygen, , Continuous PRN - O2/gases  traZODone, 150 mg, Nightly PRN      Invasive Hemodynamics:    Most Recent Range Past 24hrs   BP (Art)   No data recorded   MAP(Art)   No data recorded   RA/CVP   No data recorded   PA 45/32 (RAMY) PAP  Min: 34/25  Max: 57/37   PA(mean) 36 mmHg PAP (Mean)  Min: 27 mmHg  Max: 44 mmHg   PCWP 14 mmHg PCWP (mmHg)  Min: 14 mmHg  Max: 23 mmHg   CO 6.2 L/min CO (L/min)  Min: 5.2 L/min  Max: 7.1 L/min   CI 2.5 L/min/m2 CI (L/min/m2)  Min: 2.1 L/min/m2  Max: 2.9 L/min/m2   Mixed Venous 68 % SVO2 (%)  Min: 58 %  Max: 72 %   SVR  706 (dyne*sec)/cm5 SVR (dyne*sec)/cm5  Min: 618 (dyne*sec)/cm5  Max: 962 (dyne*sec)/cm5    (dyne*sec)/cm5 PVR (dyne*sec)/cm5  Min: 112 (dyne*sec)/cm5  Max: 282 (dyne*sec)/cm5       PHYSICAL EXAM:   Vitals:    10/12/23 1800 10/12/23 1900 10/12/23 2000 10/12/23 2005   BP: 81/59 122/89 98/68    BP Location:       Patient Position:       Pulse: 107 (!) 120 (!) 113    Resp: 18 20 (!) 29    Temp:    35.9 °C (96.6 °F)   TempSrc:       SpO2: 100% 93% 100%    Weight:         Wt Readings from Last 5 Encounters:   10/12/23 140 kg (308 lb 10.3 oz)   10/10/23 139 kg (307 lb)   12/08/22 133 kg (293 lb)   11/10/22 133 kg (292 lb 12.8 oz)   04/19/22 120  kg (264 lb 2 oz)     INTAKE/OUTPUT:  I/O last 3 completed shifts:  In: 1603.9 (11.5 mL/kg) [I.V.:1503.9 (10.7 mL/kg); IV Piggyback:100]  Out: 5250 (37.5 mL/kg) [Urine:5250 (1 mL/kg/hr)]  Weight: 140 kg      DATA:  CMP:  Recent Labs     07/19/23  2308 07/31/23  1731 09/06/23  0553 09/07/23  0532 09/08/23  0631 09/09/23  2011 09/10/23  1957 09/11/23  1713 09/12/23  1816 09/24/23  0448 09/24/23  1430 09/25/23  0137 09/25/23  1404 09/26/23  0330 09/27/23  0413 10/10/23  1910 10/11/23  0629 10/12/23  0149 10/12/23  1118      < > 138 134*   < > 134*   < > 136   < > 141 138 136 138 140 140 140 139 137 140   K 4.2   < > 4.7 4.5   < > 4.9   < > 4.2   < > 3.5 3.9 3.7 4.3 4.0 4.4 3.8 3.4* 3.7 4.2      < > 104 100   < > 97*   < > 100   < > 100 99 99 100 102 105 100 100 99 100   CO2 23*   < > 24 21   < > 27   < > 24   < > 28 29 30 29 29 28 29 28 29 29   ANIONGAP 12   < > 15 18   < > 15   < > 16   < > 17 14 11 13 13 11 15 14 13 15   BUN 13   < > 19 18   < > 16   < > 15   < > 22 20 21 17 17 15 16 18 17 19   CREATININE 1.1   < > 1.10 1.08   < > 0.92   < > 0.80   < > 1.11 1.26 0.99 0.95 0.96 0.83 1.01 0.91 1.01 1.30   EGFR 90  --   --   --   --   --   --   --   --   --   --   --   --   --   --  >90 >90 >90 74   MG  --    < > 2.24 2.07  --  2.26  --  2.12  --  2.10  --   --   --  2.32 2.10 2.08 1.92 2.10  --     < > = values in this interval not displayed.     Recent Labs     02/18/21  2245 02/19/21  1725 09/09/21  1334 09/10/21  2045 10/02/21  1738 10/03/21  0625 10/10/21  0354 10/12/21  0018 10/14/21  0834 11/03/21  2241 11/05/21  0907 07/15/23  1814 07/17/23  1543 07/19/23  2308 07/31/23  1731 08/12/23  1602 08/29/23  1846 08/30/23  0337 09/23/23  1815 09/24/23  0448 09/24/23  1430 09/25/23  0137 09/25/23  1404 09/26/23  0330 09/27/23  0413 10/10/23  1910 10/11/23  0629 10/12/23  0149 10/12/23  1118   ALBUMIN 4.3   < > 4.7   < >  --    < > 4.3 4.4   < > 4.5   < > 4.4 4.4 4.6 4.7 4.8 4.5   < > 4.3 4.4   < > 3.8 4.0 4.1  "3.6 4.6 4.3 3.9 4.2   ALT 54*   < > 25   < >  --    < > 20 56*   < > 19   < > 55* 50 44* 33 86* 54*  --  86* 87*  --   --   --   --   --  50  --   --   --    AST 40*   < > 25   < >  --    < > 19 45*   < > 18   < > 39 26 27 20 42* 29  --  49* 34  --   --   --   --   --  28  --   --   --    BILITOT 1.0   < > 0.6   < >  --    < > 1.3* 1.0   < > 2.3*   < > 0.4 0.5 0.4 0.5 0.7 0.6  --  0.4 0.3  --   --   --   --   --  0.5  --   --   --    LIPASE 40  --  90*  --  105*  --  23 54  --  23  --  42  --   --  43  --   --   --   --   --   --   --   --   --   --   --   --   --   --     < > = values in this interval not displayed.     CBC:  Recent Labs     09/23/23 1815 09/24/23 0448 09/25/23  0137 09/26/23  0330 09/27/23  0413 10/10/23  1910 10/11/23  0629 10/12/23  0149   WBC 5.3 7.0 4.6 5.4 4.9 6.9 5.1 5.1   HGB 12.8* 12.8* 11.1* 12.1* 10.7* 12.9* 11.8* 11.3*   HCT 37.4* 38.9* 34.7* 35.7* 33.4* 40.2* 37.6* 34.9*    298 256 288 268 362 276 266   MCV 77* 79* 79* 78* 79* 79* 80 77*     COAG:   Recent Labs     01/01/23  0613 07/12/23  1701 07/17/23  1543 08/29/23  1846 09/23/23  1815 09/24/23  0448 10/10/23  1910 10/12/23  1732   INR 1.1 1.2* 1.1 1.1 1.0 1.0 1.1 1.0     ABO: No results for input(s): \"ABO\" in the last 30485 hours.  HEME/ENDO:  Recent Labs     06/13/23  0537 08/29/23  1846 10/10/23  1910   FERRITIN  --  239 189   IRONSAT  --  22* 23*   TSH  --  2.05 2.46   HGBA1C 8.5* 6.2*  --       CARDIAC:   Recent Labs     03/04/21  1835 03/08/21  1001 05/26/21  1440 06/03/21  1243 06/19/21  0657 06/27/21  0328 08/03/21  1336 08/05/21  0947 08/16/21  1756 07/07/23  1546 07/12/23  1701 07/12/23  1739 07/15/23  1814 07/15/23  1920 07/17/23  1543 07/17/23  1640 08/13/23  0429 08/29/23  1846 08/30/23  0339 09/05/23  1557 09/23/23  1815 09/23/23  1902 09/23/23  2056 09/25/23  1054 10/10/23  1910 10/12/23  1732    116  --  331*  --  150  --  168  --   --   --   --   --   --   --   --   --   --   --   --   --   --   --   " "--   --  153   TROPHS  --   --   --   --   --   --   --   --    < > 17  --    < > 11   < > 8   < > CANCELED CANCELED 14 10 38 40 47  --  30  --    BNP  --   --    < >  --    < >  --    < >  --    < > 768* 161*  --  400*  --  405*  --   --  204*  --   --  119*  --   --  CANCELED 176*  --     < > = values in this interval not displayed.     Recent Labs     09/03/23  0012 09/03/23  0501 09/03/23  1058 09/04/23  2225 09/05/23  0534 09/25/23  1836 10/11/23  1519 10/11/23  1519 10/11/23  1830 10/11/23  2347 10/12/23  0543 10/12/23  1118 10/12/23  1732   LACMX 0.9 0.8  --  1.1  --  0.6 1.2  --   --   --   --   --   --    SO2MV 72 72   < > 69   < > 63 46   < > 52 60 72 58 68   O2CMX 70.5 70.8   < > 67.5   < > 61.9 45.5   < > 51.5 58.4 70.3 56.7 67.7    < > = values in this interval not displayed.     No results for input(s): \"TACROLIMUS\", \"CYCLOSPORINE\" in the last 01977 hours.    Prior to Admission Meds:  Medications Prior to Admission   Medication Sig Dispense Refill Last Dose    acetaminophen (Tylenol) 325 mg tablet Take 2 tablets (650 mg) by mouth every 4 hours if needed for mild pain (1 - 3).   Unknown    ALPRAZolam (Xanax) 2 mg tablet Take 1 tablet (2 mg) by mouth 3 times a day.   10/10/2023    blood sugar diagnostic strip USE TO TEST BEFORE MEALS AND AT BEDTIME (Patient not taking: Reported on 10/11/2023) 100 each 1 Unknown    cholecalciferol (Vitamin D-3) 25 MCG (1000 UT) capsule Take 1 capsule (25 mcg) by mouth once daily in the morning.   10/10/2023    cyanocobalamin (Vitamin B-12) 1,000 mcg tablet Take 100 mcg by mouth once daily.   10/10/2023    diphenhydrAMINE (Benadryl Allergy) 25 mg tablet Take 1 tablet (25 mg) by mouth as needed at bedtime for allergies.   Unknown    DULoxetine (Cymbalta) 30 mg DR capsule Take 1 capsule (30 mg) by mouth 2 times a day.   10/10/2023    empagliflozin (Jardiance) 10 mg Take 1 tablet (10 mg) by mouth once daily.   10/10/2023    ezetimibe (Zetia) 10 mg tablet Take 1 tablet (10 mg) " by mouth once daily.   Unknown    gabapentin (Neurontin) 100 mg capsule TAKE 2 CAPSULES BY MOUTH THREE TIMES A DAY. 180 capsule 3 10/10/2023    hydrOXYzine HCL (Atarax) 50 mg tablet Take 2 tablets (100 mg) by mouth once daily at bedtime.   10/9/2023 at evening    insulin lispro (HumaLOG) 100 unit/mL injection INJECT THREE TIMES A DAY VIA ATTACHED SLIDING SCALE (Patient not taking: Reported on 10/11/2023) 15 mL 1 Not Taking    magnesium oxide (Mag-Ox) 400 mg (241.3 mg magnesium) tablet Take 1 tablet (400 mg) by mouth once daily.   10/10/2023    milrinone in 5 % dextrose (Primacor) 40 mg/200 mL (200 mcg/mL) infusion 0.25 mcg/kg/min intravenous continuously   10/10/2023    polyethylene glycol (Glycolax, Miralax) 17 gram/dose powder Take 17 g by mouth once daily.   10/10/2023    QUEtiapine (SEROquel) 200 mg tablet Take 1 tablet (200 mg) by mouth once daily at bedtime.   10/9/2023 at evening    rivaroxaban (Xarelto) 20 mg tablet 1 tab(s) orally  - Daily 1800   10/10/2023    rOPINIRole (Requip) 2 mg tablet Take 1 tablet (2 mg) by mouth once daily at bedtime.   10/9/2023 at evening    sennosides-docusate sodium (Keyonna-Colace) 8.6-50 mg tablet Take 2 tablets by mouth 2 times a day.   10/10/2023    spironolactone (Aldactone) 25 mg tablet Take 1 tablet (25 mg) by mouth once daily.   10/10/2023    torsemide (Demadex) 100 mg tablet Take 1 tablet (100 mg) by mouth once daily.   10/10/2023    traZODone (Desyrel) 150 mg tablet Take 1 tablet (150 mg) by mouth once daily at bedtime.   10/9/2023 at evening       NUTRITION: Adult diet Regular  EMERGENCY CONTACT: Extended Emergency Contact Information  Primary Emergency Contact: CleoCathy  Home Phone: [phone]  Relation: Parent  Secondary Emergency Contact: CleoFabiana  Home Phone: [phone]  Relation: None  CODE STATUS: Full Code  FOLLOWUP:   Future Appointments   Date Time Provider Department Center   10/23/2023  3:30 PM DO Jessica [email or web address] [facility]

## 2023-10-12 NOTE — PROGRESS NOTES
Physical Therapy    Physical Therapy Evaluation    Patient Name: Stephan Gallo  MRN: [id number]  Today's Date: 10/12/2023   Time Calculation  Start Time: 1215  Stop Time: 1248  Time Calculation (min): 33 min    Assessment/Plan   PT Assessment  PT Assessment Results: Decreased strength, Decreased range of motion, Decreased endurance, Impaired balance, Decreased mobility  Rehab Prognosis: Good  Evaluation/Treatment Tolerance: Patient limited by pain, Patient limited by fatigue  Medical Staff Made Aware: Yes  End of Session Communication: Bedside nurse (HF team)  End of Session Patient Position: Bed, 4 rail up  IP OR SWING BED PT PLAN  Inpatient or Swing Bed: Inpatient  PT Plan  Treatment/Interventions: Bed mobility, Transfer training, Gait training, Stair training, Balance training  PT Plan: Skilled PT  PT Frequency: 4 times per week  PT Discharge Recommendations: Low intensity level of continued care  PT Recommended Transfer Status: Assist x1      Subjective   General Visit Information:  General  Reason for Referral: HF exacerbation, possible advanced therapies work up  Past Medical History Relevant to Rehab: HFrEF, NYHA class IV, Stage D; LVEF 10%, RV dysfunction, moderate to severe TR, NICM diagnosed 2018, due to hydrocarbon intoxication (abusing inhalants); EOTH abuse/dependence, MSSA, COPD, GERD, suicidal ideation, DM, tachycardia, and DAVONTE  Family/Caregiver Present: No  Prior to Session Communication: Bedside nurse (HF team)  Patient Position Received: Bed, 4 rail up    Home Living:  Home Living  Type of Home: Apartment  Lives With: Alone (+cat)  Home Adaptive Equipment: None  Home Layout: One level  Home Access: Stairs to enter with rails  Entrance Stairs-Number of Steps: 14    Prior Level of Function:  Prior Function Per Pt/Caregiver Report  Level of [place]: Independent with ADLs and functional transfers  Receives Help From: Family (mom)  Homemaking Assistance: Independent  Ambulatory Assistance:  Independent  Vocational: On disability  Leisure: not currently driving 2/2  license but is able to drive. not working (disability), enjoys video games and fishing. reports incr SOB going up stairs at home. Mom drives him to [employer], and usually goes grocery shopping and helps him carry groceries upstairs. most recently (since lad admission) has been mostly staying home 2/2 fatigue and SOB. +h/o falls  Prior Function Comments: goes to mom's house daily, but sleep in own apt. pt reports going up stairs in apt is difficult, going down is not too hard.    Precautions: cardiac, fall  On Nipride .3, heparin, and milrinone .375     Vital Signs:  Vital Signs  Heart Rate:  (PRE: 112; DURIN; POST: 101)  SpO2:  (PRE 98%; DURING 96% and greater; POST: 93%. on room air throughout)  BP:  (PRE: 91/75 (MAP 81); POST: 85/72 (MAP 77))  BP Method: Automatic    Objective   Pain:  Pain Assessment  Pain Assessment: 0-10  Pain Score: 8 (pt just received pain meds, discussed with RN)  Pain Type:  (swan (R neck) and back pain (overall))    Cognition:  Cognition  Overall Cognitive Status: Within Functional Limits (oriented x 4)    General Assessments:  General Observation  General Observation: Pt supine in bed, agreeable to PT eval; however, reporting incr pain in lateral neck ( 2/2 [place]), along with back pain. Pain one of the factors causing pt to move slowly during session.       Activity Tolerance  Endurance: Tolerates 10 - 20 min exercise with multiple rests  Activity Tolerance Comments: poor activity tolerance, requiring multiple rest breaks , along with pain limiting participation this session (new SWAN), and back pain. Team aware.       Static Sitting Balance  Static Sitting-Level of Assistance: Contact guard  Dynamic Sitting Balance  Dynamic Sitting-Balance:  (CGA)    Static Standing Balance  Static Standing-Level of Assistance: Minimum assistance  Dynamic Standing Balance  Dynamic Standing-Comments: Min A    Functional  Assessments:  Bed Mobility  Bed Mobility:  (Min A (supine<>Sit))    Transfers  Transfer:  (MIn A (sit<>stand), MIn A (toilet transfer))    Ambulation/Gait Training  Ambulation/Gait Training Performed: Yes (Pt amb x 60 ft, 1 standing rest break, with stable vitals (HR max 121).)    Extremity/Trunk Assessments:  RLE   RLE : Within Functional Limits  LLE   LLE : Within Functional Limits    Strength/MMT  B/L LE knee ext 3+/5, knee flex 4/5, ankle DF 4/5, ankle PF 5/5    Outcome Measures:  [facility] Basic Mobility  Turning from your back to your side while in a flat bed without using bedrails: A little  Moving from lying on your back to sitting on the side of a flat bed without using bedrails: A little  Moving to and from bed to chair (including a wheelchair): A little  Standing up from a chair using your arms (e.g. wheelchair or bedside chair): A little  To walk in hospital room: A little  Climbing 3-5 steps with railing: A lot  Basic Mobility - Total Score: 17    FSS-ICU  Ambulation: Walks >/ or equal to 50 feet with any assistance x1  Rolling: Minimal assistance (performs 75% or more of task)  Sitting: Minimal assistance (performs 75% or more of task)  Transfer Sit-to-Stand: Minimal assistance (performs 75% or more of task)  Transfer Supine-to-Sit: Minimal assistance (performs 75% or more of task)  Total Score: 18       E = Exercise and Early Mobility  Current Activity: Ambulating in rich    Tinetti  Sitting Balance: Steady, safe  Arises: Able, uses arms to help  Attempts to Arise: Able, requires more than one attempt  Immediate Standing Balance (First 5 Seconds): Steady but uses walker or other support  Standing Balance: Steady but wide stance, uses cane or other support  Nudged: Steady without walker or other support  Eyes Closed: Steady  Turned 360 Degrees: Steadiness: Unsteady (Grabs, staggers)  Turned 360 Degrees: Continuity of Steps: Continuous  Sitting Down: Uses arms or not a smooth motion  Balance Score:  10  Initiation of Gait: No hesitancy  Step Height: R Swing Foot: Right foot complete clears floor  Step Length: R Swing Foot: Does not pass left stance foot with step  Step Height: L Swing Foot: Left foot does not clear floor completely with step  Step Length: L Swing Foot: Passes right stance foot  Step Symmetry: Right and left step length not equal (Estimate)  Step Continuity: Steps appear continuous  Path: Mild/moderate deviation or uses walking aid  Trunk: No sway, no flexion, no use of arms, no walking aid  Walking Time: Heels apart  Gait Score: 7  Total Score: 17    Encounter Problems       Encounter Problems (Active)       Balance       Score 25/28 on [place]etti (Progressing)       Start:  10/12/23    Expected End:  10/26/23               Mobility       Complete 6MWT, stable vitals, with RPD 3/10 or less, RPE 13/20 or less (Progressing)       Start:  10/12/23    Expected End:  10/26/23            amb x 350 ft, no rest breaks, with stable vitals , and LRD (Progressing)       Start:  10/12/23    Expected End:  10/26/23               Pain - Adult          Transfers       sit<>stand indep (Progressing)       Start:  10/12/23    Expected End:  10/26/23            5x STS from chair, no UE assist, in 15 sec or less  (Progressing)       Start:  10/12/23    Expected End:  10/26/23

## 2023-10-13 NOTE — PROGRESS NOTES
HFICU Attending Note    33 y.o. male followed by my colleague Dr. Reeves with stage D NICM/HFrEF (EF 15-20%, LVIDD 7.7, mod RV function) on palliative inotropes admitted with worsening functional status from outpatient clinic with concern for milrinone failure. Difficult psychosocial history with multiple (39 hospitalizations in past 3 years for combination of HF/SI/HI/CP), with increased recent adherance/sobriety (though concern for ongoing psychiatric issues and desire for chronic narcotic therapy but no contract). [place] with deranged hemodynamics despite inotropic support. Merits LVAD evaluation with focus on RV and psychosocial concerns. He is not a current transplant candidate due to active smoking, prior substance use, history of suicidality, brief homocidality, obesity.     - Continue milrinone  - Nipride with initiation of ARNI  - LVAD evaluation (long term) evaluation re-initiated given clinical circumstance. Will discuss at [facility] as LVAD may meaningfully improve HF symptoms (RV would be residual concern) but will not address psychiatric/chronic pain concerns and may significantly complicate management. Appreciate multidisciplinary evaluation of complex dynamics.  - Ongoing SGT with goal for ongoing  - chronic pain consult appreciated.   - Psychiatry and social work appreciated immensely.     This critically ill patient continues to be at-risk for clinically significant deterioration / failure due to the above mentioned dysfunctional, unstable organ systems.  I have personally identified and managed all complex critical care issues to prevent aforementioned clinical deterioration.  Critical care time is spent at bedside and/or the immediate area and has included, but is not limited to, the review of diagnostic tests, labs, radiographs, serial assessments of hemodynamics, respiratory status, ventilatory management, and family updates.  Time spent in procedures and teaching are reported  separately.    Critical care time: __35_ minutes     _________________________________________________  Isai Jean MD       Objective   Admit Date: 10/10/2023  Hospital Length of Stay: 3   ICU Length of Stay: 2d 16h     PROBLEMS:  Active Hospital Problems    *Acute decompensated heart failure (CMS/HCC)      Sinus tachycardia        MEDICATIONS  Infusions:  furosemide, Last Rate: 10 mg/hr (10/12/23 1800)  heparin, Last Rate: 1,900 Units/hr (10/12/23 1730)  lactated Ringer's, Last Rate: 10 mL/hr (10/12/23 1800)  milrinone, Last Rate: 0.375 mcg/kg/min (10/13/23 0711)      Scheduled:  atorvastatin, 40 mg, Nightly  DULoxetine, 30 mg, BID  empagliflozin, 10 mg, Daily  ezetimibe, 10 mg, Daily  [Held by provider] hydrALAZINE, 25 mg, Once  hydrALAZINE, 50 mg, q8h  insulin lispro, 0-5 Units, TID with meals  polyethylene glycol, 17 g, Daily  potassium chloride CR, 20 mEq, Once  QUEtiapine, 200 mg, Nightly  rOPINIRole, 2 mg, Daily  sacubitriL-valsartan, 1 tablet, BID  spironolactone, 25 mg, Daily      PRN:  acetaminophen, 975 mg, q6h PRN   Or  acetaminophen, 650 mg, q6h PRN   Or  acetaminophen, 650 mg, q6h PRN  ALPRAZolam, 2 mg, TID PRN  dextrose 10 % in water (D10W), 0.3 g/kg/hr, Once PRN  dextrose, 25 g, q15 min PRN  glucagon, 1 mg, q15 min PRN  heparin, 5,000-10,000 Units, q4h PRN  hydrOXYzine HCL, 25 mg, q6h PRN  ondansetron, 4 mg, q6h PRN  oxyCODONE, 5 mg, q4h PRN  oxygen, , Continuous PRN - O2/gases  traZODone, 150 mg, Nightly PRN      Invasive Hemodynamics:    Most Recent Range Past 24hrs   BP (Art)   No data recorded   MAP(Art)   No data recorded   RA/CVP 13 mmHg CVP (mean)  Min: 13 mmHg  Max: 13 mmHg   PA 40/29 PAP  Min: 39/20  Max: 81/59   PA(mean) 34 mmHg PAP (Mean)  Min: 29 mmHg  Max: 68 mmHg   PCWP 22 mmHg PCWP (mmHg)  Min: 14 mmHg  Max: 22 mmHg   CO 4.2 L/min CO (L/min)  Min: 4.2 L/min  Max: 6.2 L/min   CI 1.7 L/min/m2 CI (L/min/m2)  Min: 1.7 L/min/m2  Max: 2.5 L/min/m2   Mixed Venous 57.2 % SVO2 (%)   Min: 57.2 %  Max: 68 %   SVR  1257 (dyne*sec)/cm5 SVR (dyne*sec)/cm5  Min: 706 (dyne*sec)/cm5  Max: 1257 (dyne*sec)/cm5    (dyne*sec)/cm5 PVR (dyne*sec)/cm5  Min: 192 (dyne*sec)/cm5  Max: 486 (dyne*sec)/cm5       PHYSICAL EXAM:   Vitals:    10/13/23 0432 10/13/23 0500 10/13/23 0600 10/13/23 0700   BP:  89/69 83/52 86/54   BP Location:       Patient Position:       Pulse:  106 97 96   Resp: 16 17 12 12   Temp:    35.3 °C (95.5 °F)   TempSrc:    Temporal   SpO2:  92% 100% 100%   Weight:         Wt Readings from Last 5 Encounters:   10/13/23 141 kg (310 lb 13.6 oz)   10/10/23 139 kg (307 lb)   12/08/22 133 kg (293 lb)   11/10/22 133 kg (292 lb 12.8 oz)   04/19/22 120 kg (264 lb 2 oz)     INTAKE/OUTPUT:  I/O last 3 completed shifts:  In: 3052.1 (21.6 mL/kg) [P.O.:1546; I.V.:1506.1 (10.7 mL/kg)]  Out: 7800 (55.3 mL/kg) [Urine:7800 (1.5 mL/kg/hr)]  Weight: 141 kg      DATA:  CMP:  Recent Labs     09/07/23  0532 09/08/23  0631 09/09/23  2011 09/10/23  1957 09/11/23  1713 09/12/23  1816 09/24/23  0448 09/24/23  1430 09/25/23  0137 09/25/23  1404 09/26/23  0330 09/27/23  0413 10/10/23  1910 10/11/23  0629 10/12/23  0149 10/12/23  1118 10/13/23  0356   *   < > 134*   < > 136   < > 141 138 136 138 140 140 140 139 137 140 139   K 4.5   < > 4.9   < > 4.2   < > 3.5 3.9 3.7 4.3 4.0 4.4 3.8 3.4* 3.7 4.2 3.9      < > 97*   < > 100   < > 100 99 99 100 102 105 100 100 99 100 99   CO2 21   < > 27   < > 24   < > 28 29 30 29 29 28 29 28 29 29 30   ANIONGAP 18   < > 15   < > 16   < > 17 14 11 13 13 11 15 14 13 15 14   BUN 18   < > 16   < > 15   < > 22 20 21 17 17 15 16 18 17 19 18   CREATININE 1.08   < > 0.92   < > 0.80   < > 1.11 1.26 0.99 0.95 0.96 0.83 1.01 0.91 1.01 1.30 1.14   EGFR  --   --   --   --   --   --   --   --   --   --   --   --  >90 >90 >90 74 87   MG 2.07  --  2.26  --  2.12  --  2.10  --   --   --  2.32 2.10 2.08 1.92 2.10  --  2.09    < > = values in this interval not displayed.       Recent Labs      02/18/21  2245 02/19/21  1725 09/09/21  1334 09/10/21  2045 10/02/21  1738 10/03/21  0625 10/10/21  0354 10/12/21  0018 10/14/21  0834 11/03/21  2241 11/05/21  0907 07/15/23  1814 07/17/23  1543 07/19/23  2308 07/31/23  1731 08/12/23  1602 08/29/23  1846 08/30/23  0337 09/23/23  1815 09/24/23  0448 09/24/23  1430 09/25/23  1404 09/26/23  0330 09/27/23  0413 10/10/23  1910 10/11/23  0629 10/12/23  0149 10/12/23  1118 10/13/23  0356   ALBUMIN 4.3   < > 4.7   < >  --    < > 4.3 4.4   < > 4.5   < > 4.4 4.4 4.6 4.7 4.8 4.5   < > 4.3 4.4   < > 4.0 4.1 3.6 4.6 4.3 3.9 4.2 4.3   ALT 54*   < > 25   < >  --    < > 20 56*   < > 19   < > 55* 50 44* 33 86* 54*  --  86* 87*  --   --   --   --  50  --   --   --   --    AST 40*   < > 25   < >  --    < > 19 45*   < > 18   < > 39 26 27 20 42* 29  --  49* 34  --   --   --   --  28  --   --   --   --    BILITOT 1.0   < > 0.6   < >  --    < > 1.3* 1.0   < > 2.3*   < > 0.4 0.5 0.4 0.5 0.7 0.6  --  0.4 0.3  --   --   --   --  0.5  --   --   --   --    LIPASE 40  --  90*  --  105*  --  23 54  --  23  --  42  --   --  43  --   --   --   --   --   --   --   --   --   --   --   --   --   --     < > = values in this interval not displayed.       CBC:  Recent Labs     09/24/23  0448 09/25/23  0137 09/26/23  0330 09/27/23  0413 10/10/23  1910 10/11/23  0629 10/12/23  0149 10/13/23  0356   WBC 7.0 4.6 5.4 4.9 6.9 5.1 5.1 6.6   HGB 12.8* 11.1* 12.1* 10.7* 12.9* 11.8* 11.3* 12.9*   HCT 38.9* 34.7* 35.7* 33.4* 40.2* 37.6* 34.9* 41.1    256 288 268 362 276 266 304   MCV 79* 79* 78* 79* 79* 80 77* 82       COAG:   Recent Labs     01/01/23  0613 07/12/23  1701 07/17/23  1543 08/29/23  1846 09/23/23  1815 09/24/23  0448 10/10/23  1910 10/12/23  1732   INR 1.1 1.2* 1.1 1.1 1.0 1.0 1.1 1.0       ABO:   Recent Labs     10/12/23  1732   ABO A     HEME/ENDO:  Recent Labs     06/13/23  0537 08/29/23  1846 10/10/23  1910   FERRITIN  --  239 189   IRONSAT  --  22* 23*   TSH  --  2.05 2.46   HGBA1C 8.5*  "6.2*  --         CARDIAC:   Recent Labs     03/04/21  1835 03/08/21  1001 05/26/21  1440 06/03/21  1243 06/19/21  0657 06/27/21  0328 08/03/21  1336 08/05/21  0947 08/16/21  1756 07/07/23  1546 07/12/23  1701 07/12/23  1739 07/15/23  1814 07/15/23  1920 07/17/23  1543 07/17/23  1640 08/13/23  0429 08/29/23  1846 08/30/23  0339 09/05/23  1557 09/23/23  1815 09/23/23  1902 09/23/23  2056 09/25/23  1054 10/10/23  1910 10/12/23  1732    116  --  331*  --  150  --  168  --   --   --   --   --   --   --   --   --   --   --   --   --   --   --   --   --  153   TROPHS  --   --   --   --   --   --   --   --    < > 17  --    < > 11   < > 8   < > CANCELED CANCELED 14 10 38 40 47  --  30  --    BNP  --   --    < >  --    < >  --    < >  --    < > 768* 161*  --  400*  --  405*  --   --  204*  --   --  119*  --   --  CANCELED 176*  --     < > = values in this interval not displayed.       Recent Labs     09/03/23  0012 09/03/23  0501 09/03/23  1058 09/04/23  2225 09/05/23  0534 09/25/23  1836 10/11/23  1519 10/11/23  1830 10/11/23  2347 10/12/23  0543 10/12/23  1118 10/12/23  1732 10/12/23  2317   LACMX 0.9 0.8  --  1.1  --  0.6 1.2  --   --   --   --   --   --    SO2MV 72 72   < > 69   < > 63 46   < > 60 72 58 68 62   O2CMX 70.5 70.8   < > 67.5   < > 61.9 45.5   < > 58.4 70.3 56.7 67.7 60.0    < > = values in this interval not displayed.       No results for input(s): \"TACROLIMUS\", \"CYCLOSPORINE\" in the last 02062 hours.    Prior to Admission Meds:  Medications Prior to Admission   Medication Sig Dispense Refill Last Dose    acetaminophen (Tylenol) 325 mg tablet Take 2 tablets (650 mg) by mouth every 4 hours if needed for mild pain (1 - 3).   Unknown    ALPRAZolam (Xanax) 2 mg tablet Take 1 tablet (2 mg) by mouth 3 times a day.   10/10/2023    blood sugar diagnostic strip USE TO TEST BEFORE MEALS AND AT BEDTIME (Patient not taking: Reported on 10/11/2023) 100 each 1 Unknown    cholecalciferol (Vitamin D-3) 25 MCG (1000 " UT) capsule Take 1 capsule (25 mcg) by mouth once daily in the morning.   10/10/2023    cyanocobalamin (Vitamin B-12) 1,000 mcg tablet Take 100 mcg by mouth once daily.   10/10/2023    diphenhydrAMINE (Benadryl Allergy) 25 mg tablet Take 1 tablet (25 mg) by mouth as needed at bedtime for allergies.   Unknown    DULoxetine (Cymbalta) 30 mg DR capsule Take 1 capsule (30 mg) by mouth 2 times a day.   10/10/2023    empagliflozin (Jardiance) 10 mg Take 1 tablet (10 mg) by mouth once daily.   10/10/2023    ezetimibe (Zetia) 10 mg tablet Take 1 tablet (10 mg) by mouth once daily.   Unknown    gabapentin (Neurontin) 100 mg capsule TAKE 2 CAPSULES BY MOUTH THREE TIMES A DAY. 180 capsule 3 10/10/2023    hydrOXYzine HCL (Atarax) 50 mg tablet Take 2 tablets (100 mg) by mouth once daily at bedtime.   10/9/2023 at evening    insulin lispro (HumaLOG) 100 unit/mL injection INJECT THREE TIMES A DAY VIA ATTACHED SLIDING SCALE (Patient not taking: Reported on 10/11/2023) 15 mL 1 Not Taking    magnesium oxide (Mag-Ox) 400 mg (241.3 mg magnesium) tablet Take 1 tablet (400 mg) by mouth once daily.   10/10/2023    milrinone in 5 % dextrose (Primacor) 40 mg/200 mL (200 mcg/mL) infusion 0.25 mcg/kg/min intravenous continuously   10/10/2023    polyethylene glycol (Glycolax, Miralax) 17 gram/dose powder Take 17 g by mouth once daily.   10/10/2023    QUEtiapine (SEROquel) 200 mg tablet Take 1 tablet (200 mg) by mouth once daily at bedtime.   10/9/2023 at evening    rivaroxaban (Xarelto) 20 mg tablet 1 tab(s) orally  - Daily 1800   10/10/2023    rOPINIRole (Requip) 2 mg tablet Take 1 tablet (2 mg) by mouth once daily at bedtime.   10/9/2023 at evening    sennosides-docusate sodium (Keyonna-Colace) 8.6-50 mg tablet Take 2 tablets by mouth 2 times a day.   10/10/2023    spironolactone (Aldactone) 25 mg tablet Take 1 tablet (25 mg) by mouth once daily.   10/10/2023    torsemide (Demadex) 100 mg tablet Take 1 tablet (100 mg) by mouth once daily.    10/10/2023    traZODone (Desyrel) 150 mg tablet Take 1 tablet (150 mg) by mouth once daily at bedtime.   10/9/2023 at evening       NUTRITION: Adult diet Regular  EMERGENCY CONTACT: Extended Emergency Contact Information  Primary Emergency Contact: CleoCathy  Home Phone: [phone]  Relation: Parent  Secondary Emergency Contact: CleoFabiana  [place] Phone: [phone]  Relation: None  CODE STATUS: Full Code  FOLLOWUP:   Future Appointments   Date Time Provider Department Center   10/23/2023  3:30 PM DO Jessica Haynes[address] [place] [address]

## 2023-10-13 NOTE — PROGRESS NOTES
Physical Therapy                 Therapy Communication Note    Patient Name: Stephan Gallo  MRN: [id number]  Today's Date: 10/13/2023     Discipline: Physical Therapy    Missed Visit Reason: Missed Visit Reason: Patient refused (Pt declined therapy 2/2 chest pain. Team aware.)    Missed Time: Attempt    Comment:

## 2023-10-13 NOTE — CONSULTS
"Nutrition Assessment:   Reason for Assessment: Provider consult order    Patient is a 33 y.o. male presenting with decompensated heart failure from MD office--> LUTHER and chest pressure.  Currently on milrinone and lasix drip.  Being evaluated for an LVAD.     Pt is currently NPO, awaiting test/procedure.    Nutrition History:  Food and Nutrient History: Met with patient, his mom and his aunt.  He appears sleepy at visit.  Able to report that prior to admit his appetite and intake were fair.  Had occasional N/V issues.  Moving his bowels regularly.  Has had weight shifts from fluid retention.  When discussing diet, he points to his mom and reports that his mom does the shopping and cooking and that his diet is \"not that salty\".  Did not go into details about what exactly he eats but denies eating a lot of processed foods, has not had things like chips \"in a while\".      Anthropometrics:  Height: 172.7 cm (5' 7.99\")  Weight: 141 kg (310 lb 13.6 oz)  BMI (Calculated): 47.28  IBW/kg (Dietitian Calculated): 70 kg  Percent of IBW: 201 %       Objective/Subjective Weight History:     Weight Change %:  Weight History / % Weight Change: Reports a usual body weight of 136.4kg   Weight fluctuations d/t fluids        Nutrition Focused Physical Exam Findings:    Subcutaneous Fat Loss:   Orbital Fat Pads: Well nourshed (slightly bulging fat pads)   Buccal Fat Pads: Well nourished (full, rounded cheeks)   Triceps: Well nourished (ample fat tissue)       Muscle Wasting:  Temporalis: Well nourished (well-defined muscle)  Pectoralis (Clavicular Region): Well nourished (clavicle not visible)  Deltoid/Trapezius: Well nourished (rounded appearance at arm, shoulder, neck)        Quadriceps: Well nourished (well developed, well rounded)  Gastrocnemius: Well nourished (well developed bulbous muscle)  Edema:  Edema: +2 mild   Edema Location: generalized     Objective Data:  Nutrition Significant Labs:    Na+ 139  K+ 3.9  Chloride 99  Bicarb " 30  BUN 18  Creatinine 1.14  Calcium 9.6  Phos 5.0  Mag 2.09    Nutrition Specific Mediations:      I/O:     Stool: last BM on 10/10    Dietary Orders (From admission, onward)       Start     Ordered    10/13/23 1031  NPO Diet; Effective now  Diet effective now         10/13/23 1030                     Estimated Needs:   Total Energy Estimated Needs (kCal):  (1686-3194)   Method for Estimating Needs: MSJ= 2326    Total Protein Estimated Needs (g):  (85)   Method for Estimating Needs: 1.2 x 70kg       Nutrition Diagnosis:  Malnutrition Diagnosis  Patient has Malnutrition Diagnosis: No    Nutrition Diagnosis  Patient has Nutrition Diagnosis: No    Nutrition Interventions and Recommendations:        Nutrition Prescription:  Individualized Nutrition Prescription Provided for : PO diet advancement to low sodium once pt is post-procedure  Check Vitamin D level      Time Spent/Follow-up Reminder:   Follow Up  Time Spent (min):  (60)  Last Date of Nutrition Visit: 10/13/23  Nutrition Follow-Up Needed?: Dietitian to reassess per policy  Follow up Comment: pre-VAD work-up

## 2023-10-13 NOTE — PROGRESS NOTES
HFICU PROGRESS NOTE    Stephan Gallo/[id number]    Admit Date: 10/10/2023  Hospital Length of Stay: 3   ICU Length of Stay: 2d 13h   Primary Service:   Primary HF Cardiologist: Dr. Reeves    Subjective     Event: Uneventful night, patient is resting comfortably in bed on his CPAP machine this morning, but still complains on and off chest pain, which gets worse with touch.     Pt is alert, oriented x 3, moves all extremities. Patient had episode of chest pain this morning, EKG and Trop labs obtained with no significant medical changes. Patient denies nausea, vomiting and abdominal pain. Remains HDS supported on milrinone and lasix gtt.       Plan:  - C/W lasix gtt to 10 mg/h  - C/w milrinone at 0.375 mcg/kg/min  - Up-titrating hydralazine to 50 mg PO q 8 hr  - Continue advance therapy work-up      MEDICATIONS  Infusions:  furosemide, Last Rate: 10 mg/hr (10/12/23 1800)  heparin, Last Rate: 1,900 Units/hr (10/12/23 1730)  lactated Ringer's, Last Rate: 10 mL/hr (10/12/23 1800)  milrinone, Last Rate: 0.375 mcg/kg/min (10/13/23 0711)      Scheduled:  atorvastatin, 40 mg, Nightly  DULoxetine, 30 mg, BID  empagliflozin, 10 mg, Daily  ezetimibe, 10 mg, Daily  [Held by provider] hydrALAZINE, 25 mg, Once  hydrALAZINE, 50 mg, q8h  insulin lispro, 0-5 Units, TID with meals  polyethylene glycol, 17 g, Daily  potassium chloride CR, 20 mEq, Once  QUEtiapine, 200 mg, Nightly  rOPINIRole, 2 mg, Daily  sacubitriL-valsartan, 1 tablet, BID  spironolactone, 25 mg, Daily      PRN:  acetaminophen, 975 mg, q6h PRN   Or  acetaminophen, 650 mg, q6h PRN   Or  acetaminophen, 650 mg, q6h PRN  ALPRAZolam, 2 mg, TID PRN  dextrose 10 % in water (D10W), 0.3 g/kg/hr, Once PRN  dextrose, 25 g, q15 min PRN  glucagon, 1 mg, q15 min PRN  heparin, 5,000-10,000 Units, q4h PRN  hydrOXYzine HCL, 25 mg, q6h PRN  ondansetron, 4 mg, q6h PRN  oxyCODONE, 5 mg, q4h PRN  oxygen, , Continuous PRN - O2/gases  traZODone, 150 mg, Nightly PRN      Invasive  Hemodynamics:    Most Recent Range Past 24hrs   BP (Art)   No data recorded   MAP(Art)   No data recorded   RA/CVP 13 mmHg CVP (mean)  Min: 13 mmHg  Max: 13 mmHg   PA 40/29 PAP  Min: 39/20  Max: 81/59   PA(mean) 34 mmHg PAP (Mean)  Min: 29 mmHg  Max: 68 mmHg   PCWP 22 mmHg PCWP (mmHg)  Min: 14 mmHg  Max: 22 mmHg   CO 4.2 L/min CO (L/min)  Min: 4.2 L/min  Max: 6.2 L/min   CI 1.7 L/min/m2 CI (L/min/m2)  Min: 1.7 L/min/m2  Max: 2.5 L/min/m2   Mixed Venous 57.2 % SVO2 (%)  Min: 57.2 %  Max: 68 %   SVR  1257 (dyne*sec)/cm5 SVR (dyne*sec)/cm5  Min: 706 (dyne*sec)/cm5  Max: 1257 (dyne*sec)/cm5    (dyne*sec)/cm5 PVR (dyne*sec)/cm5  Min: 192 (dyne*sec)/cm5  Max: 486 (dyne*sec)/cm5     PHYSICAL EXAM:   Visit Vitals  BP 89/69   Pulse 106   Temp 35.5 °C (95.9 °F)   Resp 17   Wt 141 kg (310 lb 13.6 oz)   SpO2 92%   BMI 47.26 kg/m²   Smoking Status Every Day   BSA 2.6 m²     Wt Readings from Last 5 Encounters:   10/13/23 141 kg (310 lb 13.6 oz)   10/10/23 139 kg (307 lb)   12/08/22 133 kg (293 lb)   11/10/22 133 kg (292 lb 12.8 oz)   04/19/22 120 kg (264 lb 2 oz)     INTAKE/OUTPUT:  I/O last 3 completed shifts:  In: 3052.1 (21.6 mL/kg) [P.O.:1546; I.V.:1506.1 (10.7 mL/kg)]  Out: 7800 (55.3 mL/kg) [Urine:7800 (1.5 mL/kg/hr)]  Weight: 141 kg      Physical Exam  Constitutional:       General: He is not in acute distress.     Appearance: He is obese. He is ill-appearing.   Neck:      Vascular: No hepatojugular reflux or JVD.      Comments: Enrrique present CDI. RIJ Introducer and SGC present   Cardiovascular:      Rate and Rhythm: Normal rate and regular rhythm.      Pulses: Normal pulses.      Heart sounds: S1 normal and S2 normal.   Pulmonary:      Effort: Pulmonary effort is normal.      Breath sounds: Normal breath sounds. No decreased breath sounds, wheezing, rhonchi or rales.   Abdominal:      General: Bowel sounds are normal. There is distension.      Palpations: Abdomen is soft. There is no hepatomegaly or splenomegaly.       Tenderness: There is no abdominal tenderness.   Genitourinary:     Comments: Voiding freely   Musculoskeletal:      Cervical back: Normal range of motion.      Right lower leg: No edema.      Left lower leg: No edema.   Neurological:      General: No focal deficit present.      Mental Status: He is alert and oriented to person, place, and time. Mental status is at baseline.       DATA:  CMP:  Recent Labs     04/23/23  1153 04/24/23  0630 04/25/23  0451 04/26/23  0454 04/27/23  0500 07/19/23  2308 07/31/23  1731 09/07/23  0532 09/08/23  0631 09/09/23  2011 09/10/23  1957 09/11/23  1713 09/12/23  1816 09/24/23  0448 09/24/23  1430 09/25/23  0137 09/25/23  1404 09/26/23  0330 09/27/23  0413 10/10/23  1910 10/11/23  0629 10/12/23  0149 10/12/23  1118 10/13/23  0356   NA  --  137  --  132*   < > 140   < > 134*   < > 134*   < > 136   < > 141 138 136 138 140 140 140 139 137 140 139   K  --  3.5  --  3.3*   < > 4.2   < > 4.5   < > 4.9   < > 4.2   < > 3.5 3.9 3.7 4.3 4.0 4.4 3.8 3.4* 3.7 4.2 3.9   CL  --  97  --  90*   < > 105   < > 100   < > 97*   < > 100   < > 100 99 99 100 102 105 100 100 99 100 99   CO2  --  21*  --  24   < > 23*   < > 21   < > 27   < > 24   < > 28 29 30 29 29 28 29 28 29 29 30   ANIONGAP  --  19  --  18   < > 12   < > 18   < > 15   < > 16   < > 17 14 11 13 13 11 15 14 13 15 14   BUN  --  27*  --  35*   < > 13   < > 18   < > 16   < > 15   < > 22 20 21 17 17 15 16 18 17 19 18   CREATININE 0.9 1.1 1.1 0.9   < > 1.1   < > 1.08   < > 0.92   < > 0.80   < > 1.11 1.26 0.99 0.95 0.96 0.83 1.01 0.91 1.01 1.30 1.14   EGFR 115 90 90 115  --  90  --   --   --   --   --   --   --   --   --   --   --   --   --  >90 >90 >90 74 87   MG  --  2.0  --   --    < >  --    < > 2.07  --  2.26  --  2.12  --  2.10  --   --   --  2.32 2.10 2.08 1.92 2.10  --  2.09    < > = values in this interval not displayed.     Recent Labs     02/18/21  2245 02/19/21  1725 09/09/21  1334 09/10/21  2045 10/02/21  1738 10/03/21  0625  10/10/21  0354 10/12/21  0018 10/14/21  0834 11/03/21  2241 11/05/21  0907 07/15/23  1814 07/17/23  1543 07/19/23  2308 07/31/23  1731 08/12/23  1602 08/29/23  1846 08/30/23  0337 09/23/23  1815 09/24/23  0448 09/24/23  1430 09/25/23  1404 09/26/23  0330 09/27/23  0413 10/10/23  1910 10/11/23  0629 10/12/23  0149 10/12/23  1118 10/13/23  0356   ALBUMIN 4.3   < > 4.7   < >  --    < > 4.3 4.4   < > 4.5   < > 4.4 4.4 4.6 4.7 4.8 4.5   < > 4.3 4.4   < > 4.0 4.1 3.6 4.6 4.3 3.9 4.2 4.3   ALT 54*   < > 25   < >  --    < > 20 56*   < > 19   < > 55* 50 44* 33 86* 54*  --  86* 87*  --   --   --   --  50  --   --   --   --    AST 40*   < > 25   < >  --    < > 19 45*   < > 18   < > 39 26 27 20 42* 29  --  49* 34  --   --   --   --  28  --   --   --   --    BILITOT 1.0   < > 0.6   < >  --    < > 1.3* 1.0   < > 2.3*   < > 0.4 0.5 0.4 0.5 0.7 0.6  --  0.4 0.3  --   --   --   --  0.5  --   --   --   --    LIPASE 40  --  90*  --  105*  --  23 54  --  23  --  42  --   --  43  --   --   --   --   --   --   --   --   --   --   --   --   --   --     < > = values in this interval not displayed.     CBC:  Recent Labs     09/24/23  0448 09/25/23  0137 09/26/23  0330 09/27/23  0413 10/10/23  1910 10/11/23  0629 10/12/23  0149 10/13/23  0356   WBC 7.0 4.6 5.4 4.9 6.9 5.1 5.1 6.6   HGB 12.8* 11.1* 12.1* 10.7* 12.9* 11.8* 11.3* 12.9*   HCT 38.9* 34.7* 35.7* 33.4* 40.2* 37.6* 34.9* 41.1    256 288 268 362 276 266 304   MCV 79* 79* 78* 79* 79* 80 77* 82     COAG:   Recent Labs     01/01/23  0613 07/12/23  1701 07/17/23  1543 08/29/23  1846 09/23/23  1815 09/24/23  0448 10/10/23  1910 10/12/23  1732   INR 1.1 1.2* 1.1 1.1 1.0 1.0 1.1 1.0     ABO:   Recent Labs     10/12/23  1732   ABO A     HEME/ENDO:  Recent Labs     06/13/23  0537 08/29/23  1846 10/10/23  1910   FERRITIN  --  239 189   IRONSAT  --  22* 23*   TSH  --  2.05 2.46   HGBA1C 8.5* 6.2*  --       CARDIAC:   Recent Labs     03/04/21  1835 03/08/21  1001 05/26/21  1440  06/03/21  1243 06/19/21  0657 06/27/21  0328 08/03/21  1336 08/05/21  0947 08/16/21  1756 07/07/23  1546 07/12/23  1701 07/12/23  1739 07/15/23  1814 07/15/23  1920 07/17/23  1543 07/17/23  1640 08/13/23  0429 08/29/23  1846 08/30/23  0339 09/05/23  1557 09/23/23  1815 09/23/23  1902 09/23/23  2056 09/25/23  1054 10/10/23  1910 10/12/23  1732    116  --  331*  --  150  --  168  --   --   --   --   --   --   --   --   --   --   --   --   --   --   --   --   --  153   TROPHS  --   --   --   --   --   --   --   --    < > 17  --    < > 11   < > 8   < > CANCELED CANCELED 14 10 38 40 47  --  30  --    BNP  --   --    < >  --    < >  --    < >  --    < > 768* 161*  --  400*  --  405*  --   --  204*  --   --  119*  --   --  CANCELED 176*  --     < > = values in this interval not displayed.     Recent Labs     09/03/23  0012 09/03/23  0501 09/03/23  1058 09/04/23  2225 09/05/23  0534 09/25/23  1836 10/11/23  1519 10/11/23  1830 10/11/23  2347 10/12/23  0543 10/12/23  1118 10/12/23  1732 10/12/23  2317   LACMX 0.9 0.8  --  1.1  --  0.6 1.2  --   --   --   --   --   --    SO2MV 72 72   < > 69   < > 63 46   < > 60 72 58 68 62   O2CMX 70.5 70.8   < > 67.5   < > 61.9 45.5   < > 58.4 70.3 56.7 67.7 60.0    < > = values in this interval not displayed.     Chest X-ray ( 10/12)  1. Trace bibasilar pleural effusions with atelectasis.  2. Mild perihilar congestion/edema.  3. Medical devices as described above.  CT of Chest (10/12)     1. Mild enlargement of the left ventricle.  2. The sternum lies within 5 mm of the right ventricle.  3. Trace bilateral pleural effusions with atelectasis.  4. Hepatic steatosis.    Vascular US Ankle Brachial Index without exercise (10/12)  Bilateral Lower PVR: No evidence of arterial occlusive disease bilaterally in the lower extremities at rest.  Right Lower PVR: Biphasic flow is noted in the right common femoral artery, right posterior tibial artery and right dorsalis pedis artery.  Left Lower  PVR: Biphasic flow is noted in the left common femoral artery, left posterior tibial artery and left dorsalis pedis artery.     Prior to Admission Meds:  Medications Prior to Admission   Medication Sig Dispense Refill Last Dose    acetaminophen (Tylenol) 325 mg tablet Take 2 tablets (650 mg) by mouth every 4 hours if needed for mild pain (1 - 3).   Unknown    ALPRAZolam (Xanax) 2 mg tablet Take 1 tablet (2 mg) by mouth 3 times a day.   10/10/2023    blood sugar diagnostic strip USE TO TEST BEFORE MEALS AND AT BEDTIME (Patient not taking: Reported on 10/11/2023) 100 each 1 Unknown    cholecalciferol (Vitamin D-3) 25 MCG (1000 UT) capsule Take 1 capsule (25 mcg) by mouth once daily in the morning.   10/10/2023    cyanocobalamin (Vitamin B-12) 1,000 mcg tablet Take 100 mcg by mouth once daily.   10/10/2023    diphenhydrAMINE (Benadryl Allergy) 25 mg tablet Take 1 tablet (25 mg) by mouth as needed at bedtime for allergies.   Unknown    DULoxetine (Cymbalta) 30 mg DR capsule Take 1 capsule (30 mg) by mouth 2 times a day.   10/10/2023    empagliflozin (Jardiance) 10 mg Take 1 tablet (10 mg) by mouth once daily.   10/10/2023    ezetimibe (Zetia) 10 mg tablet Take 1 tablet (10 mg) by mouth once daily.   Unknown    gabapentin (Neurontin) 100 mg capsule TAKE 2 CAPSULES BY MOUTH THREE TIMES A DAY. 180 capsule 3 10/10/2023    hydrOXYzine HCL (Atarax) 50 mg tablet Take 2 tablets (100 mg) by mouth once daily at bedtime.   10/9/2023 at evening    insulin lispro (HumaLOG) 100 unit/mL injection INJECT THREE TIMES A DAY VIA ATTACHED SLIDING SCALE (Patient not taking: Reported on 10/11/2023) 15 mL 1 Not Taking    magnesium oxide (Mag-Ox) 400 mg (241.3 mg magnesium) tablet Take 1 tablet (400 mg) by mouth once daily.   10/10/2023    milrinone in 5 % dextrose (Primacor) 40 mg/200 mL (200 mcg/mL) infusion 0.25 mcg/kg/min intravenous continuously   10/10/2023    polyethylene glycol (Glycolax, Miralax) 17 gram/dose powder Take 17 g by mouth  once daily.   10/10/2023    QUEtiapine (SEROquel) 200 mg tablet Take 1 tablet (200 mg) by mouth once daily at bedtime.   10/9/2023 at evening    rivaroxaban (Xarelto) 20 mg tablet 1 tab(s) orally  - Daily 1800   10/10/2023    rOPINIRole (Requip) 2 mg tablet Take 1 tablet (2 mg) by mouth once daily at bedtime.   10/9/2023 at evening    sennosides-docusate sodium (Keyonna-Colace) 8.6-50 mg tablet Take 2 tablets by mouth 2 times a day.   10/10/2023    spironolactone (Aldactone) 25 mg tablet Take 1 tablet (25 mg) by mouth once daily.   10/10/2023    torsemide (Demadex) 100 mg tablet Take 1 tablet (100 mg) by mouth once daily.   10/10/2023    traZODone (Desyrel) 150 mg tablet Take 1 tablet (150 mg) by mouth once daily at bedtime.   10/9/2023 at evening       ASSESSMENT AND PLAN:    Stephan Gallo is a 32 y/o M with PMHx significant for stage D, NYHA class IV HFrEF (10%) with RV dysfunction s/p ICD (6/2020) now with subcutaneous ICD 2/2 infection, nonischemic cardiomyopathy (2018 2/2 hydrocarbon intoxication), moderate to severe TR, COPD, DAVONTE (not on CPAP), DM2, Anxiety, Depression, PTSD, ADHD, nicotine abuse (current smoker), ETOH abuse, MSSA, GERD, LV thrombus, and PE (on xarelto) who presented to his outpatient clinic visit with Dr. Reeves where he admitted to dyspnea on exertion and significant chest pressure. He was admitted to HFICU 10/10 for further management of Acute Decompensated Heart Failure and potential LVAD therapy work-up. Arjay- [place] catheter inserted and with numbers consistent with low output state on his home milrinone dose of 0.25. Milrinone increased to 0.375 mcg/kg/min and advanced therapies email sent 10/12. ABO: A    Significant Events:     10/10: New admission   10/11 RIJ Arjay- [place] catheter inserted  10/12: LVAD therapy letter sent out and nipride gtt weaned off with up- titration of hydralazine   10/13: CT of chest obtained    Neuro:  #ADHD  #Depression with history of suicidal ideations    #Chronic pain   #Restless Leg Syndrome   -C/w home ropinirole 2 mg at bedtime   -C/w home quetiapine fumarate 200 mg at bedtime   -C/w home cymbalta 30 mg BID   -Holding home gabapentin 200 TID, patient states it does not help   -C/w home xanax 2 mg TID PRN for anxiety  -C/w home trazodone 150 mg at night  -Hydroxyzine 25 mg every 6 hours PRN  -Tylenol for pain PRN  -Chronic pain consult, ok to give PO oxycodone 5 mg q4 PRN for short term use only  -Will need chronic pain management to establish long term care plan after discharge   -Serial neuro and pain assessments   -PT/OT Consult, OOB to chair  -CAM ICU score every shift  -Sleep/wake cycle normalization     #Physical Status  -Morbid Obesity- BMI 46     #Substance abuse  History of ETOH abuse (quit in 2020)  Tobacco use/Nicotine Dependence (smokes 1-2 cigarettes daily)  Former cocaine user (quit in his 20s)  Hydrocarbon abuse (2020)  -Tox screen (10/10) positive for benzodiazepines, but patient is prescribed xanax   -Nicotine screen pending      Cardiovascular:  #NYHA Class IV, Stage D systolic heart failure NICM/HFrEF (15-20%)  #S/p ICD (2020) c/b infection/ endocarditis replaced with subcutaneous ICD (2022)  - Echo (8/30/23): LVEF 15-20%, LV severely dilated. Global hypokinesis of the LV. Moderately reduced RV function. LA mild-moderately dilated. RA is moderately to severely dilated. Moderately elevated PAP.  - Closing SGC # (9/5/23): BP 93/57 (62), CVP 6, PAP 43/23 (29), PAWP 13, CO/CI 7.0/2.87, , SVO2 71% on Dobutamine 5mcg/kg/min, Entresto 97/103, Empa 10, Carlo 50   Daily SGC # (10/12): BP 84/53 (63) CVP 8, PAP 39/22, PCWP 17, CO/CI 5.5/2.2, , SvO2 72% on lasix 20 mg/hr, nipride 0.3, entresto 24/26 mg BID, empagliflozin 10 mg and spironolactone 25 mg   - Advance therapy work-up done previously, but was declined due to social an substance abuse hx  - Admit weight 139 kg   - Daily weight (10/13): 141 kg  - Admit   -C/w milrinone 0.375  mcg/kg/min   -C/w home entresto 24/26 mg BID  -C/w empagliflozin 10 mg daily   -C/w home spironolactone 25 mg daily   -Holding home metoprolol succinate 25 mg daily  - Wean off nipride gtt with up- titrating hydralazine 50 mg q8  - C/W lasix 10 mg/h  -Hemodynamics q6  -Send advanced therapies letter for workup 10/12  -Daily standing weights, regular diet, 2L fluid restriction, strict I&Os     # Chronic Chest Pain with multiple ED visits (30-40 visits)  # Pain management consulted  - EKG (10/10/23) sinus tachycardia   -Troponin (10/10) 30  - State EKG and troponin lab with negative results (10/13)    #Nonobstructive CAD   LHC (2020) at [facility] negative     #HLD  Lipid panel (8/29/23): cholesterol 238, HDL 32, , VLDL 75,   -C/w home ezetimibe 10 mg daily   -C/w home atorvastatin 40 mg at night      #No history of Arrhythmias  -Initially, ICD placed in 2020. Removed due to infection.  -Subcutaneous ICD in place for primary prevention      #Electrolyte Disturbances  -Keep K>4 and Mag>2     Pulmonary:   #Hx of PE (on Xarelto at home)  #DAVONTE (not on CPAP)  CTA (9/22/23) negative for PE  -C/w heparin gtt, holding home xarelto   -Monitor and maintain SpO2 > 92%  -CPAP at bedtime     GI:  #Nausea  -Bowel regimen   -Zofran PRN      :  #No history of CKD  -Baseline BUN/Cr ~15/0.9  -I/Os  -Avoid hypotension and nephrotoxic agents     #Hx of Epididymal Cystic lesion  US Scrotum w Doppler (3/28/23) A large complex cystic lesion in the left epididymal head is slightly smaller compared to prior imaging with a new echogenic component without internal vascularity. Recommend follow-up as clinically indicated  -Continue to monitor      Heme:  #No active issues   Iron studies (10/10): iron 96, TIBC 409, %sat 23, folate 11.8, ferritin 189, vitamin B12 367     Endo:  #DM2  HgbA1c (8/29) 6.2  -SSI     #Thyroid  TSH (10/10): 2.46       ID:  -Afebrile, nontoxic, no s/s infx  -Trend temps q4h     Feeding/fluids:  Regular  Thromboprophylaxis: SCDs, heparin gtt  VAP bundle: n/a  Ulcer prophylaxis: n/a  Glycemic control: Sliding scale   Bowel care: Colace & miralax PRN  Indwelling catheter: None     PT/OT: following     DVT:     Code Status: Full Code  Access:  Rudy PERALTA (9/7)  ERICK Regan (10/11)     Family Primary Contact/Next of Kin: Cathy Gallo (mother) [phone]     A. -G. reviewed      Dispo: Remain in HFICU      Patient seen and assessed with Dr. Jean    _________________________________________________  SATHYA Stratton-CNP

## 2023-10-13 NOTE — PROGRESS NOTES
Occupational Therapy                 Therapy Communication Note    Patient Name: Stephan Gallo  MRN: [id number]  Today's Date: 10/13/2023     Discipline: Occupational Therapy    Missed Visit Reason: Missed Visit Reason:  (transport arrived to take patient off unit for CT scan at this time; will attempt OT next visit as appropriate)    Missed Time: Attempt    Comment:
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
## 2023-10-14 NOTE — PROGRESS NOTES
HFICU Attending Note    33 y.o. male followed by my colleague Dr. Reeves with stage D NICM/HFrEF (EF 15-20%, LVIDD 7.7, mod RV function) on palliative inotropes admitted with worsening functional status from outpatient clinic with concern for milrinone failure. Difficult psychosocial history with multiple (39 hospitalizations in past 3 years for combination of HF/SI/HI/CP), with increased recent adherance/sobriety (though concern for ongoing psychiatric issues and desire for chronic narcotic therapy but no contract). [place] with deranged hemodynamics despite inotropic support. Merits LVAD evaluation with focus on RV and psychosocial concerns. He is not a current transplant candidate due to active smoking, prior substance use, history of suicidality, brief homocidality, obesity.     Overnight, hypotension with initiation of norepinephrine. Mild TONYA. Given vasoplegia will decrease milrinone.     - Continue milrinone, decrease rate  - LVAD evaluation (long term) evaluation re-initiated given clinical circumstance. Will discuss at [facility] as LVAD may meaningfully improve HF symptoms (RV would be residual concern) but will not address psychiatric/chronic pain concerns and may significantly complicate management. Appreciate multidisciplinary evaluation of complex dynamics.  - Ongoing SGT with goal for ongoing  - chronic pain consult appreciated.   - Psychiatry and social work appreciated immensely.     This critically ill patient continues to be at-risk for clinically significant deterioration / failure due to the above mentioned dysfunctional, unstable organ systems.  I have personally identified and managed all complex critical care issues to prevent aforementioned clinical deterioration.  Critical care time is spent at bedside and/or the immediate area and has included, but is not limited to, the review of diagnostic tests, labs, radiographs, serial assessments of hemodynamics, respiratory status, ventilatory  management, and family updates.  Time spent in procedures and teaching are reported separately.    Critical care time: __35_ minutes     _________________________________________________  Isai Jean MD     Objective   Admit Date: 10/10/2023  Hospital Length of Stay: 4   ICU Length of Stay: 3d 15h     PROBLEMS:  Active Hospital Problems    *Acute decompensated heart failure (CMS/HCC)      Sinus tachycardia      MEDICATIONS  Infusions:  [Held by provider] furosemide, Last Rate: Stopped (10/14/23 0000)  heparin, Last Rate: 1,900 Units/hr (10/14/23 0400)  lactated Ringer's, Last Rate: 10 mL/hr (10/14/23 0400)  milrinone, Last Rate: 0.375 mcg/kg/min (10/14/23 0400)  norepinephrine, Last Rate: 0.08 mcg/kg/min (10/14/23 0400)      Scheduled:  atorvastatin, 40 mg, Nightly  DULoxetine, 30 mg, BID  empagliflozin, 10 mg, Daily  ezetimibe, 10 mg, Daily  [Held by provider] hydrALAZINE, 50 mg, q8h  insulin lispro, 0-5 Units, TID with meals  magnesium sulfate, 2 g, Once  polyethylene glycol, 17 g, Daily  potassium chloride CR, 20 mEq, Once  QUEtiapine, 200 mg, Nightly  rOPINIRole, 2 mg, Daily  [Held by provider] sacubitriL-valsartan, 1 tablet, BID  spironolactone, 25 mg, Daily      PRN:  acetaminophen, 975 mg, q6h PRN   Or  acetaminophen, 650 mg, q6h PRN   Or  acetaminophen, 650 mg, q6h PRN  ALPRAZolam, 2 mg, q8h PRN  dextrose 10 % in water (D10W), 0.3 g/kg/hr, Once PRN  dextrose, 25 g, q15 min PRN  glucagon, 1 mg, q15 min PRN  heparin, 5,000-10,000 Units, q4h PRN  hydrOXYzine HCL, 25 mg, q6h PRN  ondansetron, 4 mg, q6h PRN  oxyCODONE, 5 mg, q4h PRN  oxygen, , Continuous PRN - O2/gases  traZODone, 150 mg, Nightly PRN      Invasive Hemodynamics:    Most Recent Range Past 24hrs   BP (Art)   No data recorded   MAP(Art)   No data recorded   RA/CVP 12 mmHg CVP (mean)  Min: 12 mmHg  Max: 13 mmHg   PA 66/41 PAP  Min: 43/20  Max: 95/55   PA(mean) 49 mmHg PAP (Mean)  Min: 34 mmHg  Max: 69 mmHg   PCWP 22 mmHg No data recorded   CO  6.4 L/min CO (L/min)  Min: 4.6 L/min  Max: 6.4 L/min   CI 2.6 L/min/m2 CI (L/min/m2)  Min: 1.9 L/min/m2  Max: 2.6 L/min/m2   Mixed Venous 73 % SVO2 (%)  Min: 59 %  Max: 73 %   SVR  720 (dyne*sec)/cm5 SVR (dyne*sec)/cm5  Min: 568 (dyne*sec)/cm5  Max: 1199 (dyne*sec)/cm5    (dyne*sec)/cm5 PVR (dyne*sec)/cm5  Min: 226 (dyne*sec)/cm5  Max: 226 (dyne*sec)/cm5       PHYSICAL EXAM:   Vitals:    10/14/23 0600 10/14/23 0610 10/14/23 0700 10/14/23 0800   BP: 87/70 97/64 90/57    BP Location:       Patient Position:       Pulse: 105 100 98    Resp:       Temp:    35.8 °C (96.4 °F)   TempSrc:       SpO2: 100% 100% 99%    Weight:       Height:         Wt Readings from Last 5 Encounters:   10/14/23 148 kg (326 lb 15.1 oz)   10/10/23 139 kg (307 lb)   12/08/22 133 kg (293 lb)   11/10/22 133 kg (292 lb 12.8 oz)   04/19/22 120 kg (264 lb 2 oz)     INTAKE/OUTPUT:  I/O last 3 completed shifts:  In: 5723.1 (38.6 mL/kg) [P.O.:3746; I.V.:1477.1 (10 mL/kg); IV Piggyback:500]  Out: 7775 (52.4 mL/kg) [Urine:7775 (1.5 mL/kg/hr)]  Weight: 148.3 kg      DATA:  CMP:  Recent Labs     09/09/23  2011 09/10/23  1957 09/11/23  1713 09/12/23  1816 09/24/23  0448 09/24/23  1430 09/25/23  0137 09/25/23  1404 09/26/23  0330 09/27/23  0413 10/10/23  1910 10/10/23  1910 10/11/23  0629 10/12/23  0149 10/12/23  1118 10/13/23  0356 10/14/23  0248   *   < > 136   < > 141   < > 136 138 140 140 140  --  139 137 140 139 137   K 4.9   < > 4.2   < > 3.5   < > 3.7 4.3 4.0 4.4 3.8  --  3.4* 3.7 4.2 3.9 3.9   CL 97*   < > 100   < > 100   < > 99 100 102 105 100  --  100 99 100 99 95*   CO2 27   < > 24   < > 28   < > 30 29 29 28 29  --  28 29 29 30 28   ANIONGAP 15   < > 16   < > 17   < > 11 13 13 11 15  --  14 13 15 14 18   BUN 16   < > 15   < > 22   < > 21 17 17 15 16  --  18 17 19 18 20   CREATININE 0.92   < > 0.80   < > 1.11   < > 0.99 0.95 0.96 0.83 1.01  --  0.91 1.01 1.30 1.14 1.46*   EGFR  --   --   --   --   --   --   --   --   --   --  >90   < >  >90 >90 74 87 64   MG 2.26  --  2.12  --  2.10  --   --   --  2.32 2.10 2.08  --  1.92 2.10  --  2.09 1.97    < > = values in this interval not displayed.     Recent Labs     02/18/21  2245 02/19/21  1725 09/09/21  1334 09/10/21  2045 10/02/21  1738 10/03/21  0625 10/10/21  0354 10/12/21  0018 10/14/21  0834 11/03/21  2241 11/05/21  0907 07/15/23  1814 07/17/23  1543 07/19/23  2308 07/31/23  1731 08/12/23  1602 08/29/23  1846 08/30/23  0337 09/23/23  1815 09/24/23  0448 09/24/23  1430 09/26/23  0330 09/27/23  0413 10/10/23  1910 10/11/23  0629 10/12/23  0149 10/12/23  1118 10/13/23  0356 10/14/23  0248   ALBUMIN 4.3   < > 4.7   < >  --    < > 4.3 4.4   < > 4.5   < > 4.4 4.4 4.6 4.7 4.8 4.5   < > 4.3 4.4   < > 4.1 3.6 4.6 4.3 3.9 4.2 4.3 4.6   ALT 54*   < > 25   < >  --    < > 20 56*   < > 19   < > 55* 50 44* 33 86* 54*  --  86* 87*  --   --   --  50  --   --   --   --   --    AST 40*   < > 25   < >  --    < > 19 45*   < > 18   < > 39 26 27 20 42* 29  --  49* 34  --   --   --  28  --   --   --   --   --    BILITOT 1.0   < > 0.6   < >  --    < > 1.3* 1.0   < > 2.3*   < > 0.4 0.5 0.4 0.5 0.7 0.6  --  0.4 0.3  --   --   --  0.5  --   --   --   --   --    LIPASE 40  --  90*  --  105*  --  23 54  --  23  --  42  --   --  43  --   --   --   --   --   --   --   --   --   --   --   --   --   --     < > = values in this interval not displayed.     CBC:  Recent Labs     09/25/23  0137 09/26/23  0330 09/27/23  0413 10/10/23  1910 10/11/23  0629 10/12/23  0149 10/13/23  0356 10/14/23  0248   WBC 4.6 5.4 4.9 6.9 5.1 5.1 6.6 8.5   HGB 11.1* 12.1* 10.7* 12.9* 11.8* 11.3* 12.9* 13.4*   HCT 34.7* 35.7* 33.4* 40.2* 37.6* 34.9* 41.1 41.4    288 268 362 276 266 304 316   MCV 79* 78* 79* 79* 80 77* 82 78*     COAG:   Recent Labs     01/01/23  0613 07/12/23  1701 07/17/23  1543 08/29/23  1846 09/23/23  1815 09/24/23  0448 10/10/23  1910 10/12/23  1732   INR 1.1 1.2* 1.1 1.1 1.0 1.0 1.1 1.0     ABO:   Recent Labs     10/12/23  1732  "  ABO A     HEME/ENDO:  Recent Labs     06/13/23  0537 08/29/23  1846 10/10/23  1910   FERRITIN  --  239 189   IRONSAT  --  22* 23*   TSH  --  2.05 2.46   HGBA1C 8.5* 6.2*  --       CARDIAC:   Recent Labs     03/04/21  1835 03/08/21  1001 05/26/21  1440 06/03/21  1243 06/19/21  0657 06/27/21  0328 08/03/21  1336 08/05/21  0947 08/16/21  1756 07/07/23  1546 07/12/23  1701 07/12/23  1739 07/15/23  1814 07/15/23  1920 07/17/23  1543 07/17/23  1640 08/29/23  1846 08/30/23  0339 09/05/23  1557 09/23/23  1815 09/23/23  1902 09/23/23  2056 09/25/23  1054 10/10/23  1910 10/12/23  1732 10/13/23  0356 10/13/23  1259    116  --  331*  --  150  --  168  --   --   --   --   --   --   --   --   --   --   --   --   --   --   --   --  153  --   --    TROPHS  --   --   --   --   --   --   --   --    < > 17  --    < > 11   < > 8   < > CANCELED 14 10 38 40 47  --  30  --  25 18   BNP  --   --    < >  --    < >  --    < >  --    < > 768* 161*  --  400*  --  405*  --  204*  --   --  119*  --   --  CANCELED 176*  --   --   --     < > = values in this interval not displayed.     Recent Labs     09/03/23  0012 09/03/23  0501 09/03/23  1058 09/04/23  2225 09/05/23  0534 09/25/23  1836 10/11/23  1519 10/11/23  1830 10/13/23  0355 10/13/23  1259 10/13/23  1809 10/13/23  2334 10/14/23  0551   LACMX 0.9 0.8  --  1.1  --  0.6 1.2  --   --   --   --   --   --    SO2MV 72 72   < > 69   < > 63 46   < > 57 49 57 59 73   O2CMX 70.5 70.8   < > 67.5   < > 61.9 45.5   < > 56.3 48.1 55.6 57.1 71.2    < > = values in this interval not displayed.     No results for input(s): \"TACROLIMUS\", \"CYCLOSPORINE\" in the last 43919 hours.    Prior to Admission Meds:  Medications Prior to Admission   Medication Sig Dispense Refill Last Dose    acetaminophen (Tylenol) 325 mg tablet Take 2 tablets (650 mg) by mouth every 4 hours if needed for mild pain (1 - 3).   Unknown    ALPRAZolam (Xanax) 2 mg tablet Take 1 tablet (2 mg) by mouth 3 times a day.   10/10/2023 "    blood sugar diagnostic strip USE TO TEST BEFORE MEALS AND AT BEDTIME (Patient not taking: Reported on 10/11/2023) 100 each 1 Unknown    cholecalciferol (Vitamin D-3) 25 MCG (1000 UT) capsule Take 1 capsule (25 mcg) by mouth once daily in the morning.   10/10/2023    cyanocobalamin (Vitamin B-12) 1,000 mcg tablet Take 100 mcg by mouth once daily.   10/10/2023    diphenhydrAMINE (Benadryl Allergy) 25 mg tablet Take 1 tablet (25 mg) by mouth as needed at bedtime for allergies.   Unknown    DULoxetine (Cymbalta) 30 mg DR capsule Take 1 capsule (30 mg) by mouth 2 times a day.   10/10/2023    empagliflozin (Jardiance) 10 mg Take 1 tablet (10 mg) by mouth once daily.   10/10/2023    ezetimibe (Zetia) 10 mg tablet Take 1 tablet (10 mg) by mouth once daily.   Unknown    gabapentin (Neurontin) 100 mg capsule TAKE 2 CAPSULES BY MOUTH THREE TIMES A DAY. 180 capsule 3 10/10/2023    hydrOXYzine HCL (Atarax) 50 mg tablet Take 2 tablets (100 mg) by mouth once daily at bedtime.   10/9/2023 at evening    insulin lispro (HumaLOG) 100 unit/mL injection INJECT THREE TIMES A DAY VIA ATTACHED SLIDING SCALE (Patient not taking: Reported on 10/11/2023) 15 mL 1 Not Taking    magnesium oxide (Mag-Ox) 400 mg (241.3 mg magnesium) tablet Take 1 tablet (400 mg) by mouth once daily.   10/10/2023    milrinone in 5 % dextrose (Primacor) 40 mg/200 mL (200 mcg/mL) infusion 0.25 mcg/kg/min intravenous continuously   10/10/2023    polyethylene glycol (Glycolax, Miralax) 17 gram/dose powder Take 17 g by mouth once daily.   10/10/2023    QUEtiapine (SEROquel) 200 mg tablet Take 1 tablet (200 mg) by mouth once daily at bedtime.   10/9/2023 at evening    rivaroxaban (Xarelto) 20 mg tablet 1 tab(s) orally  - Daily 1800   10/10/2023    rOPINIRole (Requip) 2 mg tablet Take 1 tablet (2 mg) by mouth once daily at bedtime.   10/9/2023 at evening    sennosides-docusate sodium (Keyonna-Colace) 8.6-50 mg tablet Take 2 tablets by mouth 2 times a day.   10/10/2023     spironolactone (Aldactone) 25 mg tablet Take 1 tablet (25 mg) by mouth once daily.   10/10/2023    torsemide (Demadex) 100 mg tablet Take 1 tablet (100 mg) by mouth once daily.   10/10/2023    traZODone (Desyrel) 150 mg tablet Take 1 tablet (150 mg) by mouth once daily at bedtime.   10/9/2023 at evening       NUTRITION: Adult diet Regular  EMERGENCY CONTACT: Extended Emergency Contact Information  Primary Emergency Contact: Cathy Gallo  Home Phone: [phone]  Relation: Parent  Secondary Emergency Contact: Fabiana Gallo  Home Phone: [phone]  Relation: None  CODE STATUS: Full Code  FOLLOWUP:   Future Appointments   Date Time Provider Department Center   10/23/2023  3:30 PM DO Jessica [email or web address] [facility]

## 2023-10-14 NOTE — PROGRESS NOTES
HFICU PROGRESS NOTE    Stephan Gallo/[id number]    Admit Date: 10/10/2023  Hospital Length of Stay: 4   ICU Length of Stay: 3d 20h   Primary Service:   Primary HF Cardiologist: Dr. Reeves    Subjective     Event: Overnight after patient received evening PO entresto he became hypotensive with MAPs in the 40s. 500 LR was given x1, and levophed was started. This AM lasix gtt was stopped and milrinone gtt was decreased to 0.25 from 0.375.     Pt is alert, oriented x 3, moves all extremities. Patient states he is uncomfortable and in pain. Patient denies nausea, vomiting and abdominal pain. Currently stable on levophed at 0.7 and milrinone 0.25. Rancocas was accidentally pulled out while patient was getting up to bathroom, so SGC was removed.       Plan:  - Discontinue SGC  - C/w milrinone gtt @ 0.25   - Repeat labs in afternoon   - Continue advance therapy work-up      MEDICATIONS  Infusions:  [Held by provider] furosemide, Last Rate: Stopped (10/14/23 0000)  heparin, Last Rate: 1,900 Units/hr (10/14/23 0955)  lactated Ringer's, Last Rate: 10 mL/hr (10/14/23 0400)  milrinone, Last Rate: 0.25 mcg/kg/min (10/14/23 0955)  norepinephrine, Last Rate: 0.05 mcg/kg/min (10/14/23 0954)      Scheduled:  atorvastatin, 40 mg, Nightly  DULoxetine, 30 mg, BID  empagliflozin, 10 mg, Daily  ezetimibe, 10 mg, Daily  [Held by provider] hydrALAZINE, 50 mg, q8h  insulin lispro, 0-5 Units, TID with meals  polyethylene glycol, 17 g, Daily  potassium chloride CR, 20 mEq, Once  QUEtiapine, 200 mg, Nightly  rOPINIRole, 2 mg, Daily  [Held by provider] sacubitriL-valsartan, 1 tablet, BID  spironolactone, 25 mg, Daily      PRN:  acetaminophen, 975 mg, q6h PRN   Or  acetaminophen, 650 mg, q6h PRN   Or  acetaminophen, 650 mg, q6h PRN  ALPRAZolam, 2 mg, q8h PRN  dextrose 10 % in water (D10W), 0.3 g/kg/hr, Once PRN  dextrose, 25 g, q15 min PRN  glucagon, 1 mg, q15 min PRN  heparin, 5,000-10,000 Units, q4h PRN  hydrOXYzine HCL, 25 mg, q6h  "PRN  ondansetron, 4 mg, q6h PRN  oxyCODONE, 5 mg, q4h PRN  oxygen, , Continuous PRN - O2/gases  traZODone, 150 mg, Nightly PRN      Invasive Hemodynamics:    Most Recent Range Past 24hrs   BP (Art)   No data recorded   MAP(Art)   No data recorded   RA/CVP 21 mmHg CVP (mean)  Min: 12 mmHg  Max: 21 mmHg   PA 69/42 PAP  Min: 43/20  Max: 95/55   PA(mean) 53 mmHg PAP (Mean)  Min: 34 mmHg  Max: 69 mmHg   PCWP 22 mmHg No data recorded   CO 5.08 L/min CO (L/min)  Min: 4.6 L/min  Max: 6.4 L/min   CI 2.07 L/min/m2 CI (L/min/m2)  Min: 1.9 L/min/m2  Max: 2.6 L/min/m2   Mixed Venous 73 % SVO2 (%)  Min: 59 %  Max: 73 %   SVR  724 (dyne*sec)/cm5 SVR (dyne*sec)/cm5  Min: 568 (dyne*sec)/cm5  Max: 1199 (dyne*sec)/cm5    (dyne*sec)/cm5 PVR (dyne*sec)/cm5  Min: 226 (dyne*sec)/cm5  Max: 226 (dyne*sec)/cm5     PHYSICAL EXAM:   Visit Vitals  BP 97/54   Pulse (!) 111   Temp 35.8 °C (96.4 °F)   Resp 24   Ht 1.727 m (5' 7.99\")   Wt 148 kg (326 lb 15.1 oz)   SpO2 100%   BMI 49.72 kg/m²   Smoking Status Every Day   BSA 2.66 m²     Wt Readings from Last 5 Encounters:   10/14/23 148 kg (326 lb 15.1 oz)   10/10/23 139 kg (307 lb)   12/08/22 133 kg (293 lb)   11/10/22 133 kg (292 lb 12.8 oz)   04/19/22 120 kg (264 lb 2 oz)     INTAKE/OUTPUT:  I/O last 3 completed shifts:  In: 5723.1 (38.6 mL/kg) [P.O.:3746; I.V.:1477.1 (10 mL/kg); IV Piggyback:500]  Out: 7775 (52.4 mL/kg) [Urine:7775 (1.5 mL/kg/hr)]  Weight: 148.3 kg      Physical Exam  Constitutional:       General: He is not in acute distress.     Appearance: He is obese. He is ill-appearing.   Neck:      Vascular: No hepatojugular reflux or JVD.      Comments: Enrrique present CDI. RIJ Introducer and SGC removed   Cardiovascular:      Rate and Rhythm: Normal rate and regular rhythm.      Pulses: Normal pulses.      Heart sounds: S1 normal and S2 normal.   Pulmonary:      Effort: Pulmonary effort is normal.      Breath sounds: Normal breath sounds. No decreased breath sounds, wheezing, " rhonchi or rales.   Abdominal:      General: Bowel sounds are normal. There is distension.      Palpations: Abdomen is soft. There is no hepatomegaly or splenomegaly.      Tenderness: There is no abdominal tenderness.   Genitourinary:     Comments: Voiding freely   Musculoskeletal:      Cervical back: Normal range of motion.      Right lower leg: No edema.      Left lower leg: No edema.   Neurological:      General: No focal deficit present.      Mental Status: He is alert and oriented to person, place, and time. Mental status is at baseline.       DATA:  CMP:  Recent Labs     04/24/23  0630 04/25/23  0451 04/26/23  0454 04/27/23  0500 07/19/23  2308 07/31/23  1731 09/09/23  2011 09/10/23  1957 09/11/23  1713 09/12/23  1816 09/24/23  0448 09/24/23  1430 09/25/23  0137 09/25/23  1404 09/26/23  0330 09/27/23  0413 10/10/23  1910 10/11/23  0629 10/12/23  0149 10/12/23  1118 10/13/23  0356 10/14/23  0248     --  132*   < > 140   < > 134*   < > 136   < > 141   < > 136 138 140 140 140 139 137 140 139 137   K 3.5  --  3.3*   < > 4.2   < > 4.9   < > 4.2   < > 3.5   < > 3.7 4.3 4.0 4.4 3.8 3.4* 3.7 4.2 3.9 3.9   CL 97  --  90*   < > 105   < > 97*   < > 100   < > 100   < > 99 100 102 105 100 100 99 100 99 95*   CO2 21*  --  24   < > 23*   < > 27   < > 24   < > 28   < > 30 29 29 28 29 28 29 29 30 28   ANIONGAP 19  --  18   < > 12   < > 15   < > 16   < > 17   < > 11 13 13 11 15 14 13 15 14 18   BUN 27*  --  35*   < > 13   < > 16   < > 15   < > 22   < > 21 17 17 15 16 18 17 19 18 20   CREATININE 1.1 1.1 0.9   < > 1.1   < > 0.92   < > 0.80   < > 1.11   < > 0.99 0.95 0.96 0.83 1.01 0.91 1.01 1.30 1.14 1.46*   EGFR 90 90 115  --  90  --   --   --   --   --   --   --   --   --   --   --  >90 >90 >90 74 87 64   MG 2.0  --   --    < >  --    < > 2.26  --  2.12  --  2.10  --   --   --  2.32 2.10 2.08 1.92 2.10  --  2.09 1.97    < > = values in this interval not displayed.     Recent Labs     02/18/21  0150 02/19/21  6353  09/09/21  1334 09/10/21  2045 10/02/21  1738 10/03/21  0625 10/10/21  0354 10/12/21  0018 10/14/21  0834 11/03/21  2241 11/05/21  0907 07/15/23  1814 07/17/23  1543 07/19/23  2308 07/31/23  1731 08/12/23  1602 08/29/23  1846 08/30/23  0337 09/23/23  1815 09/24/23  0448 09/24/23  1430 09/26/23  0330 09/27/23  0413 10/10/23  1910 10/11/23  0629 10/12/23  0149 10/12/23  1118 10/13/23  0356 10/14/23  0248   ALBUMIN 4.3   < > 4.7   < >  --    < > 4.3 4.4   < > 4.5   < > 4.4 4.4 4.6 4.7 4.8 4.5   < > 4.3 4.4   < > 4.1 3.6 4.6 4.3 3.9 4.2 4.3 4.6   ALT 54*   < > 25   < >  --    < > 20 56*   < > 19   < > 55* 50 44* 33 86* 54*  --  86* 87*  --   --   --  50  --   --   --   --   --    AST 40*   < > 25   < >  --    < > 19 45*   < > 18   < > 39 26 27 20 42* 29  --  49* 34  --   --   --  28  --   --   --   --   --    BILITOT 1.0   < > 0.6   < >  --    < > 1.3* 1.0   < > 2.3*   < > 0.4 0.5 0.4 0.5 0.7 0.6  --  0.4 0.3  --   --   --  0.5  --   --   --   --   --    LIPASE 40  --  90*  --  105*  --  23 54  --  23  --  42  --   --  43  --   --   --   --   --   --   --   --   --   --   --   --   --   --     < > = values in this interval not displayed.     CBC:  Recent Labs     09/25/23  0137 09/26/23  0330 09/27/23  0413 10/10/23  1910 10/11/23  0629 10/12/23  0149 10/13/23  0356 10/14/23  0248   WBC 4.6 5.4 4.9 6.9 5.1 5.1 6.6 8.5   HGB 11.1* 12.1* 10.7* 12.9* 11.8* 11.3* 12.9* 13.4*   HCT 34.7* 35.7* 33.4* 40.2* 37.6* 34.9* 41.1 41.4    288 268 362 276 266 304 316   MCV 79* 78* 79* 79* 80 77* 82 78*     COAG:   Recent Labs     01/01/23  0613 07/12/23  1701 07/17/23  1543 08/29/23  1846 09/23/23  1815 09/24/23  0448 10/10/23  1910 10/12/23  1732   INR 1.1 1.2* 1.1 1.1 1.0 1.0 1.1 1.0     ABO:   Recent Labs     10/12/23  1732   ABO A     HEME/ENDO:  Recent Labs     06/13/23  0537 08/29/23  1846 10/10/23  1910   FERRITIN  --  239 189   IRONSAT  --  22* 23*   TSH  --  2.05 2.46   HGBA1C 8.5* 6.2*  --       CARDIAC:   Recent Labs      03/04/21  1835 03/08/21  1001 05/26/21  1440 06/03/21  1243 06/19/21  0657 06/27/21  0328 08/03/21  1336 08/05/21  0947 08/16/21  1756 07/07/23  1546 07/12/23  1701 07/12/23  1739 07/15/23  1814 07/15/23  1920 07/17/23  1543 07/17/23  1640 08/29/23  1846 08/30/23  0339 09/05/23  1557 09/23/23  1815 09/23/23  1902 09/23/23 2056 09/25/23  1054 10/10/23  1910 10/12/23  1732 10/13/23  0356 10/13/23  1259    116  --  331*  --  150  --  168  --   --   --   --   --   --   --   --   --   --   --   --   --   --   --   --  153  --   --    TROPHS  --   --   --   --   --   --   --   --    < > 17  --    < > 11   < > 8   < > CANCELED 14 10 38 40 47  --  30  --  25 18   BNP  --   --    < >  --    < >  --    < >  --    < > 768* 161*  --  400*  --  405*  --  204*  --   --  119*  --   --  CANCELED 176*  --   --   --     < > = values in this interval not displayed.     Recent Labs     09/03/23  0501 09/03/23  1058 09/04/23  2225 09/05/23  0534 09/25/23  1836 10/11/23  1519 10/11/23  1830 10/13/23  1809 10/13/23  2334 10/14/23  0551 10/14/23  1112 10/14/23  1348   LACMX 0.8  --  1.1  --  0.6 1.2  --   --   --   --   --  2.0   SO2MV 72   < > 69   < > 63 46   < > 57 59 73 65 49   O2CMX 70.8   < > 67.5   < > 61.9 45.5   < > 55.6 57.1 71.2 63.8 48.4    < > = values in this interval not displayed.     Chest X-ray ( 10/12)  1. Trace bibasilar pleural effusions with atelectasis.  2. Mild perihilar congestion/edema.  3. Medical devices as described above.  CT of Chest (10/12)     1. Mild enlargement of the left ventricle.  2. The sternum lies within 5 mm of the right ventricle.  3. Trace bilateral pleural effusions with atelectasis.  4. Hepatic steatosis.    Vascular US Ankle Brachial Index without exercise (10/12)  Bilateral Lower PVR: No evidence of arterial occlusive disease bilaterally in the lower extremities at rest.  Right Lower PVR: Biphasic flow is noted in the right common femoral artery, right posterior tibial artery and  right dorsalis pedis artery.  Left Lower PVR: Biphasic flow is noted in the left common femoral artery, left posterior tibial artery and left dorsalis pedis artery.     Prior to Admission Meds:  Medications Prior to Admission   Medication Sig Dispense Refill Last Dose    acetaminophen (Tylenol) 325 mg tablet Take 2 tablets (650 mg) by mouth every 4 hours if needed for mild pain (1 - 3).   Unknown    ALPRAZolam (Xanax) 2 mg tablet Take 1 tablet (2 mg) by mouth 3 times a day.   10/10/2023    blood sugar diagnostic strip USE TO TEST BEFORE MEALS AND AT BEDTIME (Patient not taking: Reported on 10/11/2023) 100 each 1 Unknown    cholecalciferol (Vitamin D-3) 25 MCG (1000 UT) capsule Take 1 capsule (25 mcg) by mouth once daily in the morning.   10/10/2023    cyanocobalamin (Vitamin B-12) 1,000 mcg tablet Take 100 mcg by mouth once daily.   10/10/2023    diphenhydrAMINE (Benadryl Allergy) 25 mg tablet Take 1 tablet (25 mg) by mouth as needed at bedtime for allergies.   Unknown    DULoxetine (Cymbalta) 30 mg DR capsule Take 1 capsule (30 mg) by mouth 2 times a day.   10/10/2023    empagliflozin (Jardiance) 10 mg Take 1 tablet (10 mg) by mouth once daily.   10/10/2023    ezetimibe (Zetia) 10 mg tablet Take 1 tablet (10 mg) by mouth once daily.   Unknown    gabapentin (Neurontin) 100 mg capsule TAKE 2 CAPSULES BY MOUTH THREE TIMES A DAY. 180 capsule 3 10/10/2023    hydrOXYzine HCL (Atarax) 50 mg tablet Take 2 tablets (100 mg) by mouth once daily at bedtime.   10/9/2023 at evening    insulin lispro (HumaLOG) 100 unit/mL injection INJECT THREE TIMES A DAY VIA ATTACHED SLIDING SCALE (Patient not taking: Reported on 10/11/2023) 15 mL 1 Not Taking    magnesium oxide (Mag-Ox) 400 mg (241.3 mg magnesium) tablet Take 1 tablet (400 mg) by mouth once daily.   10/10/2023    milrinone in 5 % dextrose (Primacor) 40 mg/200 mL (200 mcg/mL) infusion 0.25 mcg/kg/min intravenous continuously   10/10/2023    polyethylene glycol (Glycolax,  Miralax) 17 gram/dose powder Take 17 g by mouth once daily.   10/10/2023    QUEtiapine (SEROquel) 200 mg tablet Take 1 tablet (200 mg) by mouth once daily at bedtime.   10/9/2023 at evening    rivaroxaban (Xarelto) 20 mg tablet 1 tab(s) orally  - Daily 1800   10/10/2023    rOPINIRole (Requip) 2 mg tablet Take 1 tablet (2 mg) by mouth once daily at bedtime.   10/9/2023 at evening    sennosides-docusate sodium (Keyonna-Colace) 8.6-50 mg tablet Take 2 tablets by mouth 2 times a day.   10/10/2023    spironolactone (Aldactone) 25 mg tablet Take 1 tablet (25 mg) by mouth once daily.   10/10/2023    torsemide (Demadex) 100 mg tablet Take 1 tablet (100 mg) by mouth once daily.   10/10/2023    traZODone (Desyrel) 150 mg tablet Take 1 tablet (150 mg) by mouth once daily at bedtime.   10/9/2023 at evening       ASSESSMENT AND PLAN:    Stephan Gallo is a 32 y/o M with PMHx significant for stage D, NYHA class IV HFrEF (10%) with RV dysfunction s/p ICD (6/2020) now with subcutaneous ICD 2/2 infection, nonischemic cardiomyopathy (2018 2/2 hydrocarbon intoxication), moderate to severe TR, COPD, DAVONTE (not on CPAP), DM2, Anxiety, Depression, PTSD, ADHD, nicotine abuse (current smoker), ETOH abuse, MSSA, GERD, LV thrombus, and PE (on xarelto) who presented to his outpatient clinic visit with Dr. Reeves where he admitted to dyspnea on exertion and significant chest pressure. He was admitted to HFICU 10/10 for further management of Acute Decompensated Heart Failure and potential LVAD therapy work-up. [place] catheter inserted and with numbers consistent with low output state on his home milrinone dose of 0.25. Milrinone increased to 0.375 mcg/kg/min and advanced therapies email sent 10/12. Milrinone decreased to 0.25 on 10/14 d/t hypotension. ABO: A    Significant Events:     10/10: New admission   10/11 RIJ [place] catheter inserted  10/12: LVAD therapy letter sent out and nipride gtt weaned off with up- titration of hydralazine    10/13: CT of chest obtained  10/14: SGC accidentally pulled and removed     Neuro:  #ADHD  #Depression with history of suicidal ideations   #Chronic pain   #Restless Leg Syndrome   -C/w home ropinirole 2 mg at bedtime   -C/w home quetiapine fumarate 200 mg at bedtime   -C/w home cymbalta 30 mg BID   -Holding home gabapentin 200 TID, patient states it does not help   -C/w home xanax 2 mg TID PRN for anxiety  -C/w home trazodone 150 mg at night  -Hydroxyzine 25 mg every 6 hours PRN  -Tylenol for pain PRN  -Chronic pain consult, ok to give PO oxycodone 5 mg q4 PRN for short term use only  -Will need chronic pain management to establish long term care plan after discharge   -Serial neuro and pain assessments   -PT/OT Consult, OOB to chair  -CAM ICU score every shift  -Sleep/wake cycle normalization     #Physical Status  -Morbid Obesity- BMI 46     #Substance abuse  History of ETOH abuse (quit in 2020)  Tobacco use/Nicotine Dependence (smokes 1-2 cigarettes daily)  Former cocaine user (quit in his 20s)  Hydrocarbon abuse (2020)  -Tox screen (10/10) positive for benzodiazepines, but patient is prescribed xanax   -Nicotine screen pending 10/12     Cardiovascular:  #NYHA Class IV, Stage D systolic heart failure NICM/HFrEF (15-20%)  #S/p ICD (2020) c/b infection/ endocarditis replaced with subcutaneous ICD (2022)  - Echo (8/30/23): LVEF 15-20%, LV severely dilated. Global hypokinesis of the LV. Moderately reduced RV function. LA mild-moderately dilated. RA is moderately to severely dilated. Moderately elevated PAP.  - Closing SGC # (9/5/23): BP 93/57 (62), CVP 6, PAP 43/23 (29), PAWP 13, CO/CI 7.0/2.87, , SVO2 71% on Dobutamine 5mcg/kg/min, Entresto 97/103, Empa 10, Carlo 50   Daily SGC # (10/14): BP 94/53 (67) CVP 21, PAP 69/42 (53), CO/CI 5.08/2.07, , SvO2 65% on milrinone 0.375, levophed 0.7, empagliflozin 10 mg and spironolactone 25 mg   - Advance therapy work-up done previously, but was declined due to  social an substance abuse hx  - Admit weight 139 kg   - Daily weight (10/14): 148 kg  - Admit   -C/w milrinone 0.375 mcg/kg/min  - HOLD home entresto 24/26 mg BID  - HOLD hydralazine 50 mg Q8   -C/w empagliflozin 10 mg daily   -C/w home spironolactone 25 mg daily   -Holding home metoprolol succinate 25 mg daily  - HOLD lasix gtt for now, may reinitiate once recovered from hypotension  - SGC accidentally removed 10/14 while patient got up to restroom   -Daily standing weights, regular diet, 2L fluid restriction, strict I&Os    #Advanced Therapies Evaluation  LVAD, not candidate for transplant   -Email sent on 10/12, consent signed 10/12   -Labs 10/12  -RHC 10/12  -C 6/22/23  -CPET on milrinone gtt   -ECHO 8/30/23  -CT chest 10/13  -US abdomen/aorta/iliac/IVC 10/12  -Carotid US 10/13  -LEANDER (lower) 10/13   -2 view CXR 10/13  -Device interrogation 10/13  -PFTs 3/11/21, repeat ordered 10/12   -Colonoscopy/Occult stool 2/4/21  -LVAD/TX education needs completed   -CT surgery consult needs completed   -Palliative consult ordered 10/12   -Nutrition consult ordered 10/12  -Dental consult/Orthopantogram completed 10/13 - no lucency   -Physical and Occupational Therapy - consulted and following      # Chronic Chest Pain with multiple ED visits (30-40 visits)  # Pain management consulted and following   - EKG (10/10/23) sinus tachycardia   -Troponin (10/10) 30  - State EKG and troponin lab with negative results (10/13)    #Nonobstructive CAD   [facility] (2020) at [facility] negative     #HLD  Lipid panel (8/29/23): cholesterol 238, HDL 32, , VLDL 75,   -C/w home ezetimibe 10 mg daily   -C/w home atorvastatin 40 mg at night      #No history of Arrhythmias  -Initially, ICD placed in 2020. Removed due to infection.  -Subcutaneous ICD in place for primary prevention      #Electrolyte Disturbances  -Keep K>4 and Mag>2     Pulmonary:   #Hx of PE (on Xarelto at home)  #DAVONTE (not on CPAP)  CTA (9/22/23) negative for PE  -C/w  heparin gtt, holding home xarelto   -Monitor and maintain SpO2 > 92%  -CPAP at bedtime     GI:  #Nausea  -Bowel regimen   -Zofran PRN      :  #No history of CKD  -Baseline BUN/Cr ~15/0.9  -I/Os  -Avoid hypotension and nephrotoxic agents     #Hx of Epididymal Cystic lesion  US Scrotum w Doppler (3/28/23) A large complex cystic lesion in the left epididymal head is slightly smaller compared to prior imaging with a new echogenic component without internal vascularity. Recommend follow-up as clinically indicated  -Continue to monitor      Heme:  #No active issues   Iron studies (10/10): iron 96, TIBC 409, %sat 23, folate 11.8, ferritin 189, vitamin B12 367     Endo:  #DM2  HgbA1c (8/29) 6.2  -SSI     #Thyroid  TSH (10/10): 2.46       ID:  -Afebrile, nontoxic, no s/s infx  -Trend temps q4h     Feeding/fluids: Regular  Thromboprophylaxis: SCDs, heparin gtt  VAP bundle: n/a  Ulcer prophylaxis: n/a  Glycemic control: Sliding scale   Bowel care: Colace & miralax PRN  Indwelling catheter: None     PT/OT: following      Code Status: Full Code  Access:  Rudy PERALTA (9/7)  SGC RIJ Cordis (10/11-10/14)  Lisa? May or may not place based on BP / weaning levophed 10/14      Family Primary Contact/Next of Kin: Cathy Gallo (mother) [phone]     A. -G. reviewed      Dispo: Remain in HFICU      Patient seen and assessed with Dr. Jean    _________________________________________________  SATHYA Bauer-CNP

## 2023-10-14 NOTE — SIGNIFICANT EVENT
Closing swan numbers:    BP 94/53 (67)  CVP 14  PAP 63/28 (40)  Unable to wedge  CO/CI 3.6/1.47  SVR 1155  SVO2 49%    On Milrinone 0.25, Levophed 0.6, Empa 10, Ailey 25 -> Entresto and Lasix held d/t hypotension, milrinone decreased to 0.25 from 0.375 also d/t hypotension     Not optimal swan numbers d/t patient partially removing swan catheter while getting up to use restroom.

## 2023-10-15 NOTE — PROGRESS NOTES
HFICU PROGRESS NOTE    Stephan Gallo/[id number]    Admit Date: 10/10/2023  Hospital Length of Stay: 5   ICU Length of Stay: 4d 18h   Primary Service:   Primary HF Cardiologist: Dr. Reeves    Subjective     Event: Overnight, no acute events. Pt. Remains hemodynamically supported on milrinone at 0.375mcg/kg/min.    Pt is alert, oriented x 3, moves all extremities. Patient states he is uncomfortable and in pain. Patient denies nausea, vomiting and abdominal pain.      Plan:  - C/w milrinone gtt @ 0.375  - Continue advanced therapy work-up   - If renal function worsens, consider replacing SGC     MEDICATIONS  Infusions:  [Held by provider] furosemide, Last Rate: Stopped (10/14/23 0000)  heparin, Last Rate: 19 Units/hr (10/15/23 0833)  lactated Ringer's, Last Rate: 10 mL/hr (10/15/23 0633)  milrinone, Last Rate: 0.375 mcg/kg/min (10/15/23 0833)  [Held by provider] norepinephrine, Last Rate: Stopped (10/14/23 1615)      Scheduled:  atorvastatin, 40 mg, Nightly  DULoxetine, 30 mg, BID  empagliflozin, 10 mg, Daily  ezetimibe, 10 mg, Daily  [Held by provider] hydrALAZINE, 50 mg, q8h  insulin lispro, 0-5 Units, TID with meals  polyethylene glycol, 17 g, Daily  potassium chloride CR, 20 mEq, Once  QUEtiapine, 200 mg, Nightly  rOPINIRole, 2 mg, Daily  [Held by provider] sacubitriL-valsartan, 1 tablet, BID  spironolactone, 25 mg, Daily      PRN:  acetaminophen, 975 mg, q6h PRN   Or  acetaminophen, 650 mg, q6h PRN   Or  acetaminophen, 650 mg, q6h PRN  ALPRAZolam, 2 mg, q8h PRN  dextrose 10 % in water (D10W), 0.3 g/kg/hr, Once PRN  dextrose, 25 g, q15 min PRN  glucagon, 1 mg, q15 min PRN  heparin, 5,000-10,000 Units, q4h PRN  hydrOXYzine HCL, 25 mg, q6h PRN  ondansetron, 4 mg, q6h PRN  oxyCODONE, 5 mg, q4h PRN  oxygen, , Continuous PRN - O2/gases  traZODone, 150 mg, Nightly PRN      Invasive Hemodynamics:    Most Recent Range Past 24hrs   BP (Art)   No data recorded   MAP(Art)   No data recorded   RA/CVP 15 mmHg CVP  "(mean)  Min: 15 mmHg  Max: 21 mmHg   PA 63/28 PAP  Min: 63/28  Max: 90/56   PA(mean) 40 mmHg PAP (Mean)  Min: 40 mmHg  Max: 67 mmHg   PCWP 22 mmHg No data recorded   CO 3.6 L/min CO (L/min)  Min: 3.6 L/min  Max: 5.08 L/min   CI 1.47 L/min/m2 CI (L/min/m2)  Min: 1.47 L/min/m2  Max: 2.07 L/min/m2   Mixed Venous 73 % No data recorded   SVR  1155 (dyne*sec)/cm5 SVR (dyne*sec)/cm5  Min: 724 (dyne*sec)/cm5  Max: 1155 (dyne*sec)/cm5    (dyne*sec)/cm5 No data recorded     PHYSICAL EXAM:   Visit Vitals  BP 79/59   Pulse 96   Temp 36.4 °C (97.5 °F) (Temporal)   Resp 14   Ht 1.727 m (5' 7.99\")   Wt (!) 153 kg (336 lb 10.3 oz)   SpO2 98%   BMI 51.20 kg/m²   Smoking Status Every Day   BSA 2.71 m²     Wt Readings from Last 5 Encounters:   10/15/23 (!) 153 kg (336 lb 10.3 oz)   10/10/23 139 kg (307 lb)   12/08/22 133 kg (293 lb)   11/10/22 133 kg (292 lb 12.8 oz)   04/19/22 120 kg (264 lb 2 oz)     INTAKE/OUTPUT:  I/O last 3 completed shifts:  In: 4677.5 (30.6 mL/kg) [P.O.:2100; I.V.:2077.5 (13.6 mL/kg); IV Piggyback:500]  Out: 4225 (27.7 mL/kg) [Urine:4225 (0.8 mL/kg/hr)]  Weight: 152.7 kg      Physical Exam  Constitutional:       General: He is not in acute distress.     Appearance: He is obese. He is ill-appearing.   Neck:      Vascular: No hepatojugular reflux or JVD.   Cardiovascular:      Rate and Rhythm: Normal rate and regular rhythm.      Pulses: Normal pulses.      Heart sounds: S1 normal and S2 normal.   Pulmonary:      Effort: Pulmonary effort is normal.      Breath sounds: Normal breath sounds. No decreased breath sounds, wheezing, rhonchi or rales.   Abdominal:      General: Bowel sounds are normal. There is distension.      Palpations: Abdomen is soft. There is no hepatomegaly or splenomegaly.      Tenderness: There is no abdominal tenderness.   Genitourinary:     Comments: Voiding freely   Musculoskeletal:      Cervical back: Normal range of motion.      Right lower leg: No edema.      Left lower leg: No " edema.   Neurological:      General: No focal deficit present.      Mental Status: He is alert and oriented to person, place, and time. Mental status is at baseline.       DATA:  CMP:  Recent Labs     04/26/23  0454 04/27/23  0500 07/19/23  2308 07/31/23  1731 09/11/23  1713 09/12/23  1816 09/24/23  0448 09/24/23  1430 09/26/23  0330 09/27/23  0413 10/10/23  1910 10/11/23  0629 10/12/23  0149 10/12/23  1118 10/13/23  0356 10/14/23  0248 10/14/23  1341 10/15/23  0347   *   < > 140   < > 136   < > 141   < > 140 140 140 139 137 140 139 137 134* 136   K 3.3*   < > 4.2   < > 4.2   < > 3.5   < > 4.0 4.4 3.8 3.4* 3.7 4.2 3.9 3.9 4.5 4.6   CL 90*   < > 105   < > 100   < > 100   < > 102 105 100 100 99 100 99 95* 94* 98   CO2 24   < > 23*   < > 24   < > 28   < > 29 28 29 28 29 29 30 28 29 28   ANIONGAP 18   < > 12   < > 16   < > 17   < > 13 11 15 14 13 15 14 18 16 15   BUN 35*   < > 13   < > 15   < > 22   < > 17 15 16 18 17 19 18 20 21 20   CREATININE 0.9   < > 1.1   < > 0.80   < > 1.11   < > 0.96 0.83 1.01 0.91 1.01 1.30 1.14 1.46* 1.22 1.13   EGFR 115  --  90  --   --   --   --   --   --   --  >90 >90 >90 74 87 64 80 87   MG  --    < >  --    < > 2.12  --  2.10  --  2.32 2.10 2.08 1.92 2.10  --  2.09 1.97  --  2.46*    < > = values in this interval not displayed.     Recent Labs     02/18/21  2245 02/19/21  1725 09/09/21  1334 09/10/21  2045 10/02/21  1738 10/03/21  0625 10/10/21  0354 10/12/21  0018 10/14/21  0834 11/03/21  2241 11/05/21  0907 07/15/23  1814 07/17/23  1543 07/19/23  2308 07/31/23  1731 08/12/23  1602 08/29/23  1846 08/30/23  0337 09/23/23  1815 09/24/23  0448 09/24/23  1430 10/10/23  1910 10/11/23  0629 10/12/23  0149 10/12/23  1118 10/13/23  0356 10/14/23  0248 10/14/23  1341 10/15/23  0347   ALBUMIN 4.3   < > 4.7   < >  --    < > 4.3 4.4   < > 4.5   < > 4.4 4.4 4.6 4.7 4.8 4.5   < > 4.3 4.4   < > 4.6 4.3 3.9 4.2 4.3 4.6 4.4 4.2   ALT 54*   < > 25   < >  --    < > 20 56*   < > 19   < > 55* 50 44* 33  86* 54*  --  86* 87*  --  50  --   --   --   --   --   --   --    AST 40*   < > 25   < >  --    < > 19 45*   < > 18   < > 39 26 27 20 42* 29  --  49* 34  --  28  --   --   --   --   --   --   --    BILITOT 1.0   < > 0.6   < >  --    < > 1.3* 1.0   < > 2.3*   < > 0.4 0.5 0.4 0.5 0.7 0.6  --  0.4 0.3  --  0.5  --   --   --   --   --   --   --    LIPASE 40  --  90*  --  105*  --  23 54  --  23  --  42  --   --  43  --   --   --   --   --   --   --   --   --   --   --   --   --   --     < > = values in this interval not displayed.     CBC:  Recent Labs     09/26/23  0330 09/27/23  0413 10/10/23  1910 10/11/23  0629 10/12/23  0149 10/13/23  0356 10/14/23  0248 10/15/23  0347   WBC 5.4 4.9 6.9 5.1 5.1 6.6 8.5 5.1   HGB 12.1* 10.7* 12.9* 11.8* 11.3* 12.9* 13.4* 12.0*   HCT 35.7* 33.4* 40.2* 37.6* 34.9* 41.1 41.4 37.3*    268 362 276 266 304 316 242   MCV 78* 79* 79* 80 77* 82 78* 78*     COAG:   Recent Labs     01/01/23  0613 07/12/23  1701 07/17/23  1543 08/29/23  1846 09/23/23  1815 09/24/23  0448 10/10/23  1910 10/12/23  1732   INR 1.1 1.2* 1.1 1.1 1.0 1.0 1.1 1.0     ABO:   Recent Labs     10/12/23  1732   ABO A     HEME/ENDO:  Recent Labs     06/13/23  0537 08/29/23 1846 10/10/23  1910   FERRITIN  --  239 189   IRONSAT  --  22* 23*   TSH  --  2.05 2.46   HGBA1C 8.5* 6.2*  --       CARDIAC:   Recent Labs     03/04/21  1835 03/08/21  1001 05/26/21  1440 06/03/21  1243 06/19/21  0657 06/27/21  0328 08/03/21  1336 08/05/21  0947 08/16/21  1756 07/07/23  1546 07/12/23  1701 07/12/23  1739 07/15/23  1814 07/15/23  1920 07/17/23  1543 07/17/23  1640 08/29/23  1846 08/30/23  0339 09/05/23  1557 09/23/23  1815 09/23/23  1902 09/23/23  2056 09/25/23  1054 10/10/23  1910 10/12/23  1732 10/13/23  0356 10/13/23  1259    116  --  331*  --  150  --  168  --   --   --   --   --   --   --   --   --   --   --   --   --   --   --   --  153  --   --    TROPHS  --   --   --   --   --   --   --   --    < > 17  --    < > 11    < > 8   < > CANCELED 14 10 38 40 47  --  30  --  25 18   BNP  --   --    < >  --    < >  --    < >  --    < > 768* 161*  --  400*  --  405*  --  204*  --   --  119*  --   --  CANCELED 176*  --   --   --     < > = values in this interval not displayed.     Recent Labs     09/03/23  0501 09/03/23  1058 09/04/23  2225 09/05/23  0534 09/25/23  1836 10/11/23  1519 10/11/23  1830 10/13/23  1809 10/13/23  2334 10/14/23  0551 10/14/23  1112 10/14/23  1348   LACMX 0.8  --  1.1  --  0.6 1.2  --   --   --   --   --  2.0   SO2MV 72   < > 69   < > 63 46   < > 57 59 73 65 49   O2CMX 70.8   < > 67.5   < > 61.9 45.5   < > 55.6 57.1 71.2 63.8 48.4    < > = values in this interval not displayed.     Chest X-ray ( 10/12)  1. Trace bibasilar pleural effusions with atelectasis.  2. Mild perihilar congestion/edema.  3. Medical devices as described above.  CT of Chest (10/12)     1. Mild enlargement of the left ventricle.  2. The sternum lies within 5 mm of the right ventricle.  3. Trace bilateral pleural effusions with atelectasis.  4. Hepatic steatosis.    Vascular US Ankle Brachial Index without exercise (10/12)  Bilateral Lower PVR: No evidence of arterial occlusive disease bilaterally in the lower extremities at rest.  Right Lower PVR: Biphasic flow is noted in the right common femoral artery, right posterior tibial artery and right dorsalis pedis artery.  Left Lower PVR: Biphasic flow is noted in the left common femoral artery, left posterior tibial artery and left dorsalis pedis artery.     Prior to Admission Meds:  Medications Prior to Admission   Medication Sig Dispense Refill Last Dose    acetaminophen (Tylenol) 325 mg tablet Take 2 tablets (650 mg) by mouth every 4 hours if needed for mild pain (1 - 3).   Unknown    ALPRAZolam (Xanax) 2 mg tablet Take 1 tablet (2 mg) by mouth 3 times a day.   10/10/2023    blood sugar diagnostic strip USE TO TEST BEFORE MEALS AND AT BEDTIME (Patient not taking: Reported on 10/11/2023) 100 each  1 Unknown    cholecalciferol (Vitamin D-3) 25 MCG (1000 UT) capsule Take 1 capsule (25 mcg) by mouth once daily in the morning.   10/10/2023    cyanocobalamin (Vitamin B-12) 1,000 mcg tablet Take 100 mcg by mouth once daily.   10/10/2023    diphenhydrAMINE (Benadryl Allergy) 25 mg tablet Take 1 tablet (25 mg) by mouth as needed at bedtime for allergies.   Unknown    DULoxetine (Cymbalta) 30 mg DR capsule Take 1 capsule (30 mg) by mouth 2 times a day.   10/10/2023    empagliflozin (Jardiance) 10 mg Take 1 tablet (10 mg) by mouth once daily.   10/10/2023    ezetimibe (Zetia) 10 mg tablet Take 1 tablet (10 mg) by mouth once daily.   Unknown    gabapentin (Neurontin) 100 mg capsule TAKE 2 CAPSULES BY MOUTH THREE TIMES A DAY. 180 capsule 3 10/10/2023    hydrOXYzine HCL (Atarax) 50 mg tablet Take 2 tablets (100 mg) by mouth once daily at bedtime.   10/9/2023 at evening    insulin lispro (HumaLOG) 100 unit/mL injection INJECT THREE TIMES A DAY VIA ATTACHED SLIDING SCALE (Patient not taking: Reported on 10/11/2023) 15 mL 1 Not Taking    magnesium oxide (Mag-Ox) 400 mg (241.3 mg magnesium) tablet Take 1 tablet (400 mg) by mouth once daily.   10/10/2023    milrinone in 5 % dextrose (Primacor) 40 mg/200 mL (200 mcg/mL) infusion 0.25 mcg/kg/min intravenous continuously   10/10/2023    polyethylene glycol (Glycolax, Miralax) 17 gram/dose powder Take 17 g by mouth once daily.   10/10/2023    QUEtiapine (SEROquel) 200 mg tablet Take 1 tablet (200 mg) by mouth once daily at bedtime.   10/9/2023 at evening    rivaroxaban (Xarelto) 20 mg tablet 1 tab(s) orally  - Daily 1800   10/10/2023    rOPINIRole (Requip) 2 mg tablet Take 1 tablet (2 mg) by mouth once daily at bedtime.   10/9/2023 at evening    sennosides-docusate sodium (Keyonna-Colace) 8.6-50 mg tablet Take 2 tablets by mouth 2 times a day.   10/10/2023    spironolactone (Aldactone) 25 mg tablet Take 1 tablet (25 mg) by mouth once daily.   10/10/2023    torsemide (Demadex) 100 mg  tablet Take 1 tablet (100 mg) by mouth once daily.   10/10/2023    traZODone (Desyrel) 150 mg tablet Take 1 tablet (150 mg) by mouth once daily at bedtime.   10/9/2023 at evening       ASSESSMENT AND PLAN:    Stephan Gallo is a 34 y/o M with PMHx significant for stage D, NYHA class IV HFrEF (10%) with RV dysfunction s/p ICD (6/2020) now with subcutaneous ICD 2/2 infection, nonischemic cardiomyopathy (2018 2/2 hydrocarbon intoxication), moderate to severe TR, COPD, DAVONTE (not on CPAP), DM2, Anxiety, Depression, PTSD, ADHD, nicotine abuse (current smoker), ETOH abuse, MSSA, GERD, LV thrombus, and PE (on xarelto) who presented to his outpatient clinic visit with Dr. Reeves where he admitted to dyspnea on exertion and significant chest pressure. He was admitted to HFICU 10/10 for further management of Acute Decompensated Heart Failure and potential LVAD therapy work-up. [place] catheter inserted and with numbers consistent with low output state on his home milrinone dose of 0.25. Milrinone increased to 0.375 mcg/kg/min and advanced therapies email sent 10/12. Milrinone decreased to 0.25 on 10/14 d/t hypotension, but was increased back to 0.375 10/15 when pt. Became normotensive. ABO: A    Significant Events:     10/10: New admission   10/11 RIJ [place]- [place] catheter inserted  10/12: LVAD therapy letter sent out and nipride gtt weaned off with up- titration of hydralazine   10/13: CT of chest obtained  10/14: SGC accidentally pulled and removed     Neuro:  #ADHD  #Depression with history of suicidal ideations   #Chronic pain   #Restless Leg Syndrome   -C/w home ropinirole 2 mg at bedtime   -C/w home quetiapine fumarate 200 mg at bedtime   -C/w home cymbalta 30 mg BID   -Holding home gabapentin 200 TID, patient states it does not help   -C/w home xanax 2 mg TID PRN for anxiety  -C/w home trazodone 150 mg at night  -Hydroxyzine 25 mg every 6 hours PRN  -Tylenol for pain PRN  -Chronic pain consult, ok to give PO  oxycodone 5 mg q4 PRN for short term use only  -Will need chronic pain management to establish long term care plan after discharge   -Serial neuro and pain assessments   -PT/OT Consult, OOB to chair  -CAM ICU score every shift  -Sleep/wake cycle normalization     #Physical Status  -Morbid Obesity- BMI 46     #Substance abuse  History of ETOH abuse (quit in 2020)  Tobacco use/Nicotine Dependence (smokes 1-2 cigarettes daily)  Former cocaine user (quit in his 20s)  Hydrocarbon abuse (2020)  -Tox screen (10/10) positive for benzodiazepines, but patient is prescribed xanax   -Nicotine screen drawn 10/12 and pending results 10/15     Cardiovascular:  #NYHA Class IV, Stage D systolic heart failure NICM/HFrEF (15-20%)  #S/p ICD (2020) c/b infection/ endocarditis replaced with subcutaneous ICD (2022)  - Echo (8/30/23): LVEF 15-20%, LV severely dilated. Global hypokinesis of the LV. Moderately reduced RV function. LA mild-moderately dilated. RA is moderately to severely dilated. Moderately elevated PAP.  - Closing SGC # (9/5/23): BP 93/57 (62), CVP 6, PAP 43/23 (29), PAWP 13, CO/CI 7.0/2.87, , SVO2 71% on Dobutamine 5mcg/kg/min, Entresto 97/103, Empa 10, [place] 50   Closing SGC # (10/14): BP 94/53 (67) CVP 21, PAP 69/42 (53), CO/CI 5.08/2.07, , SvO2 65% on milrinone 0.375, levophed 0.7, empagliflozin 10 mg and spironolactone 25 mg   - Advance therapy work-up done previously, but was declined due to social and substance abuse hx  - Admit weight 139 kg   - Daily weight (10/15): 153 kg  - Admit   - C/w milrinone 0.375 mcg/kg/min  - HOLD home entresto 24/26 mg BID  - HOLD hydralazine 50 mg Q8   -C/w empagliflozin 10 mg daily   -C/w home spironolactone 25 mg daily   -Holding home metoprolol succinate 25 mg daily  - 80mg IVP lasix once 10/15. Spot diurese as needed.   - SGC accidentally removed 10/14 while patient got up to restroom. No plans to reinsert unless renal function declines.    -Daily standing  weights, regular diet, 2L fluid restriction, strict I&Os    #Advanced Therapies Evaluation  LVAD, not candidate for transplant   -Email sent on 10/12, consent signed 10/12   -Labs 10/12  -RHC 10/12  -[facility] 6/22/23  -CPET on milrinone gtt   -ECHO 8/30/23  -CT chest 10/13  -US abdomen/aorta/iliac/IVC 10/12  -Carotid US 10/13  -LEANDER (lower) 10/13   -2 view CXR 10/13  -Device interrogation 10/13  -PFTs 3/11/21, repeat ordered 10/12   -Colonoscopy/Occult stool 2/4/21  -LVAD/TX education needs completed   -CT surgery consult needs completed   -Palliative consult ordered 10/12   -Nutrition consult ordered 10/12  -Dental consult/Orthopantogram completed 10/13 - no lucency   -Physical and Occupational Therapy - consulted and following      # Chronic Chest Pain with multiple ED visits (30-40 visits)  # Pain management consulted and following   - EKG (10/10/23) sinus tachycardia   -Troponin (10/10) 30  - State EKG and troponin lab with negative results (10/13)    #Nonobstructive CAD   [facility] (2020) at [facility] negative     #HLD  Lipid panel (8/29/23): cholesterol 238, HDL 32, , VLDL 75,   -C/w home ezetimibe 10 mg daily   -C/w home atorvastatin 40 mg at night      #No history of Arrhythmias  -Initially, ICD placed in 2020. Removed due to infection.  -Subcutaneous ICD in place for primary prevention      #Electrolyte Disturbances  -Keep K>4 and Mag>2     Pulmonary:   #Hx of PE (on Xarelto at home)  #DAVONTE (not on CPAP)  CTA (9/22/23) negative for PE  -C/w heparin gtt, holding home xarelto   -Monitor and maintain SpO2 > 92%  -CPAP at bedtime     GI:  #Nausea  -Bowel regimen   -Zofran PRN      :  #No history of CKD  -Baseline BUN/Cr ~15/0.9  -I/Os  -Avoid hypotension and nephrotoxic agents     #Hx of Epididymal Cystic lesion  US Scrotum w Doppler (3/28/23) A large complex cystic lesion in the left epididymal head is slightly smaller compared to prior imaging with a new echogenic component without internal vascularity.  Recommend follow-up as clinically indicated  -Continue to monitor      Heme:  #No active issues   Iron studies (10/10): iron 96, TIBC 409, %sat 23, folate 11.8, ferritin 189, vitamin B12 367     Endo:  #DM2  HgbA1c (8/29) 6.2  -SSI     #Thyroid  TSH (10/10): 2.46       ID:  -Afebrile, nontoxic, no s/s infx  -Trend temps q4h     Feeding/fluids: Regular  Thromboprophylaxis: SCDs, heparin gtt  VAP bundle: n/a  Ulcer prophylaxis: n/a  Glycemic control: Sliding scale   Bowel care: Colace & miralax PRN  Indwelling catheter: None     PT/OT: following      Code Status: Full Code  Access:  PIVs   Enrrique catheter(9/7)  SGC RIJ Cordis (10/11-10/14)     Family Primary Contact/Next of Kin: Cathy Gallo (mother) [phone]     A. -G. reviewed      Dispo: Remain in HFICU      Patient seen and assessed with Dr. Jean    _________________________________________________  SATHYA Christopher-CNP

## 2023-10-15 NOTE — PROGRESS NOTES
HFICU Attending Note    33 y.o. male followed by my colleague Dr. Reeves with stage D NICM/HFrEF (EF 15-20%, LVIDD 7.7, mod RV function) on palliative inotropes admitted with worsening functional status from outpatient clinic with concern for milrinone failure. Difficult psychosocial history with multiple (39 hospitalizations in past 3 years for combination of HF/SI/HI/CP), with increased recent adherance/sobriety (though concern for ongoing psychiatric issues and desire for chronic narcotic therapy but no contract). Jaffrey with deranged hemodynamics despite inotropic support. Merits LVAD evaluation with focus on RV and psychosocial concerns. He is not a current transplant candidate due to active smoking, prior substance use, history of suicidality, brief homocidality, obesity.     Events:   - Jaffrey removed after introducer dislodged.     Plan:  - Continue milrinone  - LVAD evaluation (long term) evaluation re-initiated given clinical circumstance. Will discuss at [facility] as LVAD may meaningfully improve HF symptoms (RV would be residual concern) but will not address psychiatric/chronic pain concerns and may significantly complicate management. He clearly has advanced HF phenotype. Appreciate multidisciplinary evaluation of complex dynamics.  - complete the evaluation  - chronic pain consult appreciated.   - palliative care  - Psychiatry and social work appreciated immensely.     This critically ill patient continues to be at-risk for clinically significant deterioration / failure due to the above mentioned dysfunctional, unstable organ systems.  I have personally identified and managed all complex critical care issues to prevent aforementioned clinical deterioration.  Critical care time is spent at bedside and/or the immediate area and has included, but is not limited to, the review of diagnostic tests, labs, radiographs, serial assessments of hemodynamics, respiratory status, ventilatory management, and family  updates.  Time spent in procedures and teaching are reported separately.    Critical care time: __35_ minutes     _________________________________________________  Isai Jean MD     Objective   Admit Date: 10/10/2023  Hospital Length of Stay: 5   ICU Length of Stay: 4d 14h     PROBLEMS:  Active Hospital Problems    *Acute decompensated heart failure (CMS/HCC)      Sinus tachycardia      MEDICATIONS  Infusions:  [Held by provider] furosemide, Last Rate: Stopped (10/14/23 0000)  heparin, Last Rate: 1,900 Units/hr (10/15/23 0633)  lactated Ringer's, Last Rate: 10 mL/hr (10/15/23 0633)  milrinone, Last Rate: 0.375 mcg/kg/min (10/15/23 0633)  [Held by provider] norepinephrine, Last Rate: Stopped (10/14/23 1615)      Scheduled:  atorvastatin, 40 mg, Nightly  DULoxetine, 30 mg, BID  empagliflozin, 10 mg, Daily  ezetimibe, 10 mg, Daily  [Held by provider] hydrALAZINE, 50 mg, q8h  insulin lispro, 0-5 Units, TID with meals  polyethylene glycol, 17 g, Daily  potassium chloride CR, 20 mEq, Once  QUEtiapine, 200 mg, Nightly  rOPINIRole, 2 mg, Daily  [Held by provider] sacubitriL-valsartan, 1 tablet, BID  spironolactone, 25 mg, Daily      PRN:  acetaminophen, 975 mg, q6h PRN   Or  acetaminophen, 650 mg, q6h PRN   Or  acetaminophen, 650 mg, q6h PRN  ALPRAZolam, 2 mg, q8h PRN  dextrose 10 % in water (D10W), 0.3 g/kg/hr, Once PRN  dextrose, 25 g, q15 min PRN  glucagon, 1 mg, q15 min PRN  heparin, 5,000-10,000 Units, q4h PRN  hydrOXYzine HCL, 25 mg, q6h PRN  ondansetron, 4 mg, q6h PRN  oxyCODONE, 5 mg, q4h PRN  oxygen, , Continuous PRN - O2/gases  traZODone, 150 mg, Nightly PRN      Invasive Hemodynamics:    Most Recent Range Past 24hrs   BP (Art)   No data recorded   MAP(Art)   No data recorded   RA/CVP 15 mmHg CVP (mean)  Min: 15 mmHg  Max: 21 mmHg   PA 63/28 PAP  Min: 63/28  Max: 90/56   PA(mean) 40 mmHg PAP (Mean)  Min: 40 mmHg  Max: 67 mmHg   PCWP 22 mmHg No data recorded   CO 3.6 L/min CO (L/min)  Min: 3.6 L/min   "Max: 5.08 L/min   CI 1.47 L/min/m2 CI (L/min/m2)  Min: 1.47 L/min/m2  Max: 2.07 L/min/m2   Mixed Venous 73 % No data recorded   SVR  1155 (dyne*sec)/cm5 SVR (dyne*sec)/cm5  Min: 724 (dyne*sec)/cm5  Max: 1155 (dyne*sec)/cm5    (dyne*sec)/cm5 No data recorded       PHYSICAL EXAM:   Vitals:    10/15/23 0500 10/15/23 0600 10/15/23 0700 10/15/23 0747   BP: 81/52 85/64 79/59    BP Location:       Patient Position:       Pulse: 97 94 99 96   Resp: 22 11 13 14   Temp:       TempSrc:       SpO2: 98% 98% 98%    Weight:  (!) 153 kg (336 lb 10.3 oz)     Height:  1.727 m (5' 7.99\")       Wt Readings from Last 5 Encounters:   10/15/23 (!) 153 kg (336 lb 10.3 oz)   10/10/23 139 kg (307 lb)   12/08/22 133 kg (293 lb)   11/10/22 133 kg (292 lb 12.8 oz)   04/19/22 120 kg (264 lb 2 oz)     INTAKE/OUTPUT:  I/O last 3 completed shifts:  In: 4677.5 (30.6 mL/kg) [P.O.:2100; I.V.:2077.5 (13.6 mL/kg); IV Piggyback:500]  Out: 4225 (27.7 mL/kg) [Urine:4225 (0.8 mL/kg/hr)]  Weight: 152.7 kg      DATA:  CMP:  Recent Labs     09/11/23  1713 09/12/23  1816 09/24/23  0448 09/24/23  1430 09/26/23  0330 09/27/23  0413 10/10/23  1910 10/10/23  1910 10/11/23  0629 10/12/23  0149 10/12/23  1118 10/13/23  0356 10/14/23  0248 10/14/23  1341 10/15/23  0347      < > 141   < > 140 140 140  --  139 137 140 139 137 134* 136   K 4.2   < > 3.5   < > 4.0 4.4 3.8  --  3.4* 3.7 4.2 3.9 3.9 4.5 4.6      < > 100   < > 102 105 100  --  100 99 100 99 95* 94* 98   CO2 24   < > 28   < > 29 28 29  --  28 29 29 30 28 29 28   ANIONGAP 16   < > 17   < > 13 11 15  --  14 13 15 14 18 16 15   BUN 15   < > 22   < > 17 15 16  --  18 17 19 18 20 21 20   CREATININE 0.80   < > 1.11   < > 0.96 0.83 1.01  --  0.91 1.01 1.30 1.14 1.46* 1.22 1.13   EGFR  --   --   --   --   --   --  >90   < > >90 >90 74 87 64 80 87   MG 2.12  --  2.10  --  2.32 2.10 2.08  --  1.92 2.10  --  2.09 1.97  --  2.46*    < > = values in this interval not displayed.       Recent Labs     " 02/18/21  2245 02/19/21  1725 09/09/21  1334 09/10/21  2045 10/02/21  1738 10/03/21  0625 10/10/21  0354 10/12/21  0018 10/14/21  0834 11/03/21  2241 11/05/21  0907 07/15/23  1814 07/17/23  1543 07/19/23  2308 07/31/23  1731 08/12/23  1602 08/29/23  1846 08/30/23  0337 09/23/23  1815 09/24/23  0448 09/24/23  1430 10/10/23  1910 10/11/23  0629 10/12/23  0149 10/12/23  1118 10/13/23  0356 10/14/23  0248 10/14/23  1341 10/15/23  0347   ALBUMIN 4.3   < > 4.7   < >  --    < > 4.3 4.4   < > 4.5   < > 4.4 4.4 4.6 4.7 4.8 4.5   < > 4.3 4.4   < > 4.6 4.3 3.9 4.2 4.3 4.6 4.4 4.2   ALT 54*   < > 25   < >  --    < > 20 56*   < > 19   < > 55* 50 44* 33 86* 54*  --  86* 87*  --  50  --   --   --   --   --   --   --    AST 40*   < > 25   < >  --    < > 19 45*   < > 18   < > 39 26 27 20 42* 29  --  49* 34  --  28  --   --   --   --   --   --   --    BILITOT 1.0   < > 0.6   < >  --    < > 1.3* 1.0   < > 2.3*   < > 0.4 0.5 0.4 0.5 0.7 0.6  --  0.4 0.3  --  0.5  --   --   --   --   --   --   --    LIPASE 40  --  90*  --  105*  --  23 54  --  23  --  42  --   --  43  --   --   --   --   --   --   --   --   --   --   --   --   --   --     < > = values in this interval not displayed.       CBC:  Recent Labs     09/26/23  0330 09/27/23  0413 10/10/23  1910 10/11/23  0629 10/12/23  0149 10/13/23  0356 10/14/23  0248 10/15/23  0347   WBC 5.4 4.9 6.9 5.1 5.1 6.6 8.5 5.1   HGB 12.1* 10.7* 12.9* 11.8* 11.3* 12.9* 13.4* 12.0*   HCT 35.7* 33.4* 40.2* 37.6* 34.9* 41.1 41.4 37.3*    268 362 276 266 304 316 242   MCV 78* 79* 79* 80 77* 82 78* 78*       COAG:   Recent Labs     01/01/23  0613 07/12/23  1701 07/17/23  1543 08/29/23  1846 09/23/23  1815 09/24/23  0448 10/10/23  1910 10/12/23  1732   INR 1.1 1.2* 1.1 1.1 1.0 1.0 1.1 1.0       ABO:   Recent Labs     10/12/23  1732   ABO A       HEME/ENDO:  Recent Labs     06/13/23  0537 08/29/23  1846 10/10/23  1910   FERRITIN  --  239 189   IRONSAT  --  22* 23*   TSH  --  2.05 2.46   HGBA1C 8.5*  "6.2*  --         CARDIAC:   Recent Labs     03/04/21  1835 03/08/21  1001 05/26/21  1440 06/03/21  1243 06/19/21  0657 06/27/21  0328 08/03/21  1336 08/05/21  0947 08/16/21  1756 07/07/23  1546 07/12/23  1701 07/12/23  1739 07/15/23  1814 07/15/23  1920 07/17/23  1543 07/17/23  1640 08/29/23  1846 08/30/23  0339 09/05/23  1557 09/23/23  1815 09/23/23  1902 09/23/23  2056 09/25/23  1054 10/10/23  1910 10/12/23  1732 10/13/23  0356 10/13/23  1259    116  --  331*  --  150  --  168  --   --   --   --   --   --   --   --   --   --   --   --   --   --   --   --  153  --   --    TROPHS  --   --   --   --   --   --   --   --    < > 17  --    < > 11   < > 8   < > CANCELED 14 10 38 40 47  --  30  --  25 18   BNP  --   --    < >  --    < >  --    < >  --    < > 768* 161*  --  400*  --  405*  --  204*  --   --  119*  --   --  CANCELED 176*  --   --   --     < > = values in this interval not displayed.       Recent Labs     09/03/23  0501 09/03/23  1058 09/04/23  2225 09/05/23  0534 09/25/23  1836 10/11/23  1519 10/11/23  1830 10/13/23  1809 10/13/23  2334 10/14/23  0551 10/14/23  1112 10/14/23  1348   LACMX 0.8  --  1.1  --  0.6 1.2  --   --   --   --   --  2.0   SO2MV 72   < > 69   < > 63 46   < > 57 59 73 65 49   O2CMX 70.8   < > 67.5   < > 61.9 45.5   < > 55.6 57.1 71.2 63.8 48.4    < > = values in this interval not displayed.       No results for input(s): \"TACROLIMUS\", \"CYCLOSPORINE\" in the last 07731 hours.    Prior to Admission Meds:  Medications Prior to Admission   Medication Sig Dispense Refill Last Dose    acetaminophen (Tylenol) 325 mg tablet Take 2 tablets (650 mg) by mouth every 4 hours if needed for mild pain (1 - 3).   Unknown    ALPRAZolam (Xanax) 2 mg tablet Take 1 tablet (2 mg) by mouth 3 times a day.   10/10/2023    blood sugar diagnostic strip USE TO TEST BEFORE MEALS AND AT BEDTIME (Patient not taking: Reported on 10/11/2023) 100 each 1 Unknown    cholecalciferol (Vitamin D-3) 25 MCG (1000 UT) " capsule Take 1 capsule (25 mcg) by mouth once daily in the morning.   10/10/2023    cyanocobalamin (Vitamin B-12) 1,000 mcg tablet Take 100 mcg by mouth once daily.   10/10/2023    diphenhydrAMINE (Benadryl Allergy) 25 mg tablet Take 1 tablet (25 mg) by mouth as needed at bedtime for allergies.   Unknown    DULoxetine (Cymbalta) 30 mg DR capsule Take 1 capsule (30 mg) by mouth 2 times a day.   10/10/2023    empagliflozin (Jardiance) 10 mg Take 1 tablet (10 mg) by mouth once daily.   10/10/2023    ezetimibe (Zetia) 10 mg tablet Take 1 tablet (10 mg) by mouth once daily.   Unknown    gabapentin (Neurontin) 100 mg capsule TAKE 2 CAPSULES BY MOUTH THREE TIMES A DAY. 180 capsule 3 10/10/2023    hydrOXYzine HCL (Atarax) 50 mg tablet Take 2 tablets (100 mg) by mouth once daily at bedtime.   10/9/2023 at evening    insulin lispro (HumaLOG) 100 unit/mL injection INJECT THREE TIMES A DAY VIA ATTACHED SLIDING SCALE (Patient not taking: Reported on 10/11/2023) 15 mL 1 Not Taking    magnesium oxide (Mag-Ox) 400 mg (241.3 mg magnesium) tablet Take 1 tablet (400 mg) by mouth once daily.   10/10/2023    milrinone in 5 % dextrose (Primacor) 40 mg/200 mL (200 mcg/mL) infusion 0.25 mcg/kg/min intravenous continuously   10/10/2023    polyethylene glycol (Glycolax, Miralax) 17 gram/dose powder Take 17 g by mouth once daily.   10/10/2023    QUEtiapine (SEROquel) 200 mg tablet Take 1 tablet (200 mg) by mouth once daily at bedtime.   10/9/2023 at evening    rivaroxaban (Xarelto) 20 mg tablet 1 tab(s) orally  - Daily 1800   10/10/2023    rOPINIRole (Requip) 2 mg tablet Take 1 tablet (2 mg) by mouth once daily at bedtime.   10/9/2023 at evening    sennosides-docusate sodium (Keyonna-Colace) 8.6-50 mg tablet Take 2 tablets by mouth 2 times a day.   10/10/2023    spironolactone (Aldactone) 25 mg tablet Take 1 tablet (25 mg) by mouth once daily.   10/10/2023    torsemide (Demadex) 100 mg tablet Take 1 tablet (100 mg) by mouth once daily.    10/10/2023    traZODone (Desyrel) 150 mg tablet Take 1 tablet (150 mg) by mouth once daily at bedtime.   10/9/2023 at evening       NUTRITION: Adult diet Regular  EMERGENCY CONTACT: Extended Emergency Contact Information  Primary Emergency Contact: CleoCathy  Home Phone: [phone]  Relation: Parent  Secondary Emergency Contact: CleoFabiana  [place] Phone: [phone]  Relation: None  CODE STATUS: Full Code  FOLLOWUP:   Future Appointments   Date Time Provider Department Center   10/23/2023  3:30 PM DO Jessica Haynes[address] [place] [address]

## 2023-10-16 NOTE — CARE PLAN
Problem: COGNITION/SAFETY  Goal: Patient will score WFL on standardized cognitive assessment without cues and within reasonable time frame  Outcome: Progressing     Problem: ADLs  Goal: Patient will perform UB and LB bathing  with independent level of assistance and raised toilet seat.  Outcome: Progressing  Goal: Patient with complete upper body dressing with independent level of assistance donning and doffing all UE clothes with no adaptive equipment while sitting or standing.  Outcome: Progressing  Goal: Patient with complete lower body dressing with independent level of assistance donning and doffing all LE clothes  with no adaptive equipment while sitting/standing.  Outcome: Progressing  Goal: Patient will complete daily grooming tasks  with independent level of assistance and PRN adaptive equipment while standing sink side and completing set up.  Outcome: Progressing     Problem: EXERCISE/STRENGTHENING  Goal: Patient with increase BUE stregnth to 4+/4 and bilateral  by 5 pound bilateral UE   Outcome: Progressing     Problem: TRANSFERS  Goal: Patient will perform bed mobility independent level of assistance and bed rails in order to improve safety and independence with mobility  Outcome: Progressing  Goal: Patient will complete functional transfer to all surfaces with least restrictive device with independent level of assistance.  Outcome: Progressing     Problem: MOBILITY  Goal: Patient will perform Functional mobility  Household distances/Community Distances with independent level of assistance and least restrictive device in order to improve safety and functional mobility.  Outcome: Progressing

## 2023-10-16 NOTE — PROGRESS NOTES
Physical Therapy    Physical Therapy Treatment    Patient Name: Stephan Gallo  MRN: [id number]  Today's Date: 10/16/2023  Time Calculation  Start Time: 1450  Stop Time: 1521  Time Calculation (min): 31 min       Assessment/Plan   PT Assessment  PT Assessment Results: Decreased strength, Decreased endurance, Impaired balance, Decreased mobility, Pain  Rehab Prognosis: Good  Evaluation/Treatment Tolerance: Patient limited by pain, Patient limited by fatigue  Medical Staff Made Aware: Yes  End of Session Communication: Bedside nurse  End of Session Patient Position: Bed, 3 rail up, Alarm off, not on at start of session  PT Plan  Inpatient/Swing Bed or Outpatient: Inpatient  PT Plan  Treatment/Interventions: Bed mobility, Transfer training, Gait training, Stair training, Balance training, Strengthening, Endurance training, Therapeutic exercise, Therapeutic activity  PT Plan: Skilled PT  PT Frequency: 4 times per week  PT Discharge Recommendations: Low intensity level of continued care  PT Recommended Transfer Status: Assist x1  PT - OK to Discharge: Yes    General Visit Information:   PT  Visit  PT Received On: 10/16/23  General  Family/Caregiver Present: No  Prior to Session Communication: Bedside nurse  Patient Position Received: Bed, 3 rail up, Alarm off, not on at start of session  General Comment: Shelby-linda catheter; milrinone .375 mcg; heparin gtt    Subjective     Precautions:  Precautions  Medical Precautions: Cardiac precautions, Fall precautions    Vital Signs:  Vital Signs  Heart Rate:  (PRE: 119, DURINs; POST: 129)  Resp:  (PRE: 25; POST: 17)  SpO2:  (PRE: 90%; POST: 92%)  BP:  (PRE: 93/74; POST: 92/52)    Objective     Pain:  Pain Assessment  Pain Assessment: 0-10  Pain Score: 8  Pain Location:  (back/chest (pt reports he has been dealing with this pain for >2 years); RN aware)    Cognition:  Cognition  Overall Cognitive Status: Within Functional Limits  Orientation Level: Oriented  X4    Activity Tolerance:  Activity Tolerance  Early Mobility/Exercise Safety Screen: Proceed with mobilization - No exclusion criteria met  Rate of Perceived Dyspnea (RPD): PRE: 1/10; DURIN/10  Rate of Perceived Exertion (RPE): PRE: ; DURIN/20    Treatments:     Bed Mobility  Bed Mobility: Yes  Bed Mobility 1  Bed Mobility 1: Supine to sitting  Level of Assistance 1: Distant supervision  Bed Mobility 2  Bed Mobility  2: Sitting to supine  Level of Assistance 2: Distant supervision    Ambulation/Gait Training  Ambulation/Gait Training Performed: Yes  Ambulation/Gait Training 1  Surface 1: Level tile  Device 1: No device  Assistance 1: Contact guard  Quality of Gait 1: Wide base of support  Comments/Distance (ft) 1: 15 ft  Transfers  Transfer: Yes  Transfer 1  Transfer From 1: Sit to  Transfer to 1: Stand  Technique 1: Sit to stand  Transfer Level of Assistance 1: Close supervision  Transfers 2  Transfer From 2: Stand to  Transfer to 2: Sit  Technique 2: Stand to sit  Transfer Level of Assistance 2: Close supervision    Outcome Measures:  [facility] Basic Mobility  Turning from your back to your side while in a flat bed without using bedrails: None  Moving from lying on your back to sitting on the side of a flat bed without using bedrails: A little  Moving to and from bed to chair (including a wheelchair): A little  Standing up from a chair using your arms (e.g. wheelchair or bedside chair): A little  To walk in hospital room: A little  Climbing 3-5 steps with railing: A little  Basic Mobility - Total Score: 19    FSS-ICU  Ambulation: Walks >/ or equal to 150 feet with minimal assistance x1  Rolling: Supervision or set-up only  Sitting: Supervision or set-up only  Transfer Sit-to-Stand: Supervision or set-up only  Transfer Supine-to-Sit: Supervision or set-up only  Total Score: 24      ICU Mobility Screen  Early Mobility/Exercise Safety Screen: Proceed with mobilization - No exclusion criteria met  E =  Exercise and Early Mobility  Early Mobility/Exercise Safety Screen: Proceed with mobilization - No exclusion criteria met  Current Activity: Ambulating in rich  Tinetti  Sitting Balance: Steady, safe  Arises: Able without using arms  Attempts to Arise: Able to arise, one attempt  Immediate Standing Balance (First 5 Seconds): Steady without walker or other support  Standing Balance: Narrow stance without support  Nudged: Steady without walker or other support  Eyes Closed: Unsteady  Turned 360 Degrees: Steadiness: Steady  Turned 360 Degrees: Continuity of Steps: Discontinuous steps  Sitting Down: Safe, smooth motion  Balance Score: 14  Initiation of Gait: No hesitancy  Step Height: R Swing Foot: Right foot complete clears floor  Step Length: R Swing Foot: Passes left stance foot  Step Height: L Swing Foot: Left foot complete clears floor  Step Length: L Swing Foot: Passes right stance foot  Step Symmetry: Right and left step appear equal  Step Continuity: Steps appear continuous  Path: Straight without walking aid  Trunk: No sway, no flexion, no use of arms, no walking aid  Walking Time: Heels apart  Gait Score: 11  Total Score: 25  Score indicates low risk of falls.    Other Measures  5x Sit to Stand: 14.99 seconds; no UE assist. RPD 3/10, RPE 9/20  6 min Walk Test: 443 ft; RPD 5/10, RPE 12/20    Education Documentation  Handouts, taught by Amy Da Silva PT at 10/16/2023  3:44 PM.  Learner: Patient  Readiness: Acceptance  Method: Explanation, Demonstration, Handout  Response: Verbalizes Understanding, Demonstrated Understanding  Comment: RPE/RPD scales    Education Comments  No comments found.      OP EDUCATION:  Education  Individual(s) Educated: Patient  Education Provided: Fall Risk (RPD/RPE scales)  Patient/Caregiver Demonstrated Understanding: yes  Plan of Care Discussed and Agreed Upon: yes  Patient Response to Education: Patient/Caregiver Verbalized Understanding of Information    Encounter Problems        Encounter Problems (Active)       Balance       Score 25/28 on Tinetti (Met)       Start:  10/12/23    Expected End:  10/26/23    Resolved:  10/16/23    Updated to: Pt will score >45 on JALLOH for safe community ambulation    Update reason: Goal Met         Pt will score >45 on JALLOH for safe community ambulation (Progressing)       Start:  10/16/23    Expected End:  10/26/23                   Mobility       Complete 6MWT, stable vitals, with RPD 3/10 or less, RPE 13/20 or less (Progressing)       Start:  10/12/23    Expected End:  10/26/23            amb x 350 ft, no rest breaks, with stable vitals , and LRD (Progressing)       Start:  10/12/23    Expected End:  10/26/23               Pain - Adult          Transfers       sit<>stand indep (Progressing)       Start:  10/12/23    Expected End:  10/26/23            5x STS from chair, no UE assist, in 15 sec or less  (Met)       Start:  10/12/23    Expected End:  10/26/23    Resolved:  10/16/23    Updated to: Pt will complete 5XSTS from lowest bed height without UE assist <12 seconds with RPD </=3/10, RPE </= 13/20 for improved functional mobility.    Update reason: Goal Met         Pt will complete 5XSTS from lowest bed height without UE assist <12 seconds with RPD </=3/10, RPE </= 13/20 for improved functional mobility. (Progressing)       Start:  10/16/23    Expected End:  10/26/23                     Signed by Amy Da Silva DPT

## 2023-10-16 NOTE — PROGRESS NOTES
HFICU PROGRESS NOTE    Stephan Gallo/[id number]    Admit Date: 10/10/2023  Hospital Length of Stay: 6   ICU Length of Stay: 5d 17h   Primary Service:   Primary HF Cardiologist: Dr. Reeves    Subjective     Event: Overnight, no acute events. Pt. Remains hemodynamically supported on milrinone at 0.375mcg/kg/min.    Pt is alert, oriented x 3, moves all extremities. Patient states he is uncomfortable and in pain. Patient denies nausea, vomiting and abdominal pain.      Plan:  - C/w milrinone gtt @ 0.375  - Lasix 40 mg once  - Continue advanced therapy work-up   - If renal function worsens, consider replacing SGC     MEDICATIONS  Infusions:  [Held by provider] furosemide, Last Rate: Stopped (10/14/23 0000)  heparin, Last Rate: 1,900 Units/hr (10/16/23 0705)  lactated Ringer's, Last Rate: 10 mL/hr (10/16/23 0705)  milrinone, Last Rate: 0.375 mcg/kg/min (10/16/23 0705)  [Held by provider] norepinephrine, Last Rate: Stopped (10/14/23 1615)      Scheduled:  atorvastatin, 40 mg, Nightly  DULoxetine, 30 mg, BID  empagliflozin, 10 mg, Daily  ezetimibe, 10 mg, Daily  furosemide, 40 mg, Once  hydrALAZINE, 25 mg, q8h  insulin lispro, 0-5 Units, TID with meals  isosorbide dinitrate, 10 mg, TID  polyethylene glycol, 17 g, Daily  potassium chloride CR, 20 mEq, Once  QUEtiapine, 200 mg, Nightly  rOPINIRole, 2 mg, Daily  [Held by provider] sacubitriL-valsartan, 1 tablet, BID  spironolactone, 25 mg, Daily      PRN:  acetaminophen, 975 mg, q6h PRN   Or  acetaminophen, 650 mg, q6h PRN   Or  acetaminophen, 650 mg, q6h PRN  ALPRAZolam, 2 mg, q8h PRN  dextrose 10 % in water (D10W), 0.3 g/kg/hr, Once PRN  dextrose, 25 g, q15 min PRN  glucagon, 1 mg, q15 min PRN  heparin, 5,000-10,000 Units, q4h PRN  hydrOXYzine HCL, 25 mg, q6h PRN  ondansetron, 4 mg, q6h PRN  oxyCODONE, 5 mg, q4h PRN  oxygen, , Continuous PRN - O2/gases  traZODone, 150 mg, Nightly PRN      Invasive Hemodynamics:    Most Recent Range Past 24hrs   BP (Art)   No data  "recorded   MAP(Art)   No data recorded   RA/CVP 15 mmHg No data recorded   PA 63/28 No data recorded   PA(mean) 40 mmHg No data recorded   PCWP 22 mmHg No data recorded   CO 3.6 L/min No data recorded   CI 1.47 L/min/m2 No data recorded   Mixed Venous 73 % No data recorded   SVR  1155 (dyne*sec)/cm5 No data recorded    (dyne*sec)/cm5 No data recorded     PHYSICAL EXAM:   Visit Vitals  BP (!) 105/49   Pulse (!) 119   Temp 35.8 °C (96.4 °F)   Resp 20   Ht 1.727 m (5' 7.99\")   Wt (!) 153 kg (336 lb 10.3 oz)   SpO2 95%   BMI 51.20 kg/m²   Smoking Status Every Day   BSA 2.71 m²     Wt Readings from Last 5 Encounters:   10/15/23 (!) 153 kg (336 lb 10.3 oz)   10/10/23 139 kg (307 lb)   12/08/22 133 kg (293 lb)   11/10/22 133 kg (292 lb 12.8 oz)   04/19/22 120 kg (264 lb 2 oz)     INTAKE/OUTPUT:  I/O last 3 completed shifts:  In: 1514.8 (9.9 mL/kg) [I.V.:1514.8 (9.9 mL/kg)]  Out: 6000 (39.3 mL/kg) [Urine:6000 (1.1 mL/kg/hr)]  Weight: 152.7 kg      Physical Exam  Constitutional:       General: He is not in acute distress.     Appearance: He is obese. He is ill-appearing.   Neck:      Vascular: No hepatojugular reflux or JVD.   Cardiovascular:      Rate and Rhythm: Normal rate and regular rhythm.      Pulses: Normal pulses.      Heart sounds: S1 normal and S2 normal.   Pulmonary:      Effort: Pulmonary effort is normal.      Breath sounds: Normal breath sounds. No decreased breath sounds, wheezing, rhonchi or rales.   Abdominal:      General: Bowel sounds are normal. There is distension.      Palpations: Abdomen is soft. There is no hepatomegaly or splenomegaly.      Tenderness: There is no abdominal tenderness.   Genitourinary:     Comments: Voiding freely   Musculoskeletal:      Cervical back: Normal range of motion.      Right lower leg: No edema.      Left lower leg: No edema.   Neurological:      General: No focal deficit present.      Mental Status: He is alert and oriented to person, place, and time. Mental " status is at baseline.       DATA:  CMP:  Recent Labs     07/19/23  2308 07/31/23  1731 09/24/23  0448 09/24/23  1430 09/26/23  0330 09/27/23  0413 10/10/23  1910 10/11/23  0629 10/12/23  0149 10/12/23  1118 10/13/23  0356 10/14/23  0248 10/14/23  1341 10/15/23  0347 10/16/23  0428      < > 141   < > 140 140 140 139 137 140 139 137 134* 136 134*   K 4.2   < > 3.5   < > 4.0 4.4 3.8 3.4* 3.7 4.2 3.9 3.9 4.5 4.6 4.3      < > 100   < > 102 105 100 100 99 100 99 95* 94* 98 94*   CO2 23*   < > 28   < > 29 28 29 28 29 29 30 28 29 28 29   ANIONGAP 12   < > 17   < > 13 11 15 14 13 15 14 18 16 15 15   BUN 13   < > 22   < > 17 15 16 18 17 19 18 20 21 20 19   CREATININE 1.1   < > 1.11   < > 0.96 0.83 1.01 0.91 1.01 1.30 1.14 1.46* 1.22 1.13 1.04   EGFR 90  --   --   --   --   --  >90 >90 >90 74 87 64 80 87 >90   MG  --    < > 2.10  --  2.32 2.10 2.08 1.92 2.10  --  2.09 1.97  --  2.46* 2.19    < > = values in this interval not displayed.     Recent Labs     02/18/21  2245 02/19/21  1725 09/09/21  1334 09/10/21  2045 10/02/21  1738 10/03/21  0625 10/10/21  0354 10/12/21  0018 10/14/21  0834 11/03/21  2241 11/05/21  0907 07/15/23  1814 07/17/23  1543 07/19/23  2308 07/31/23  1731 08/12/23  1602 08/29/23  1846 08/30/23  0337 09/23/23  1815 09/24/23  0448 09/24/23  1430 10/10/23  1910 10/11/23  0629 10/12/23  0149 10/12/23  1118 10/13/23  0356 10/14/23  0248 10/14/23  1341 10/15/23  0347 10/16/23  0428   ALBUMIN 4.3   < > 4.7   < >  --    < > 4.3 4.4   < > 4.5   < > 4.4 4.4 4.6 4.7 4.8 4.5   < > 4.3 4.4   < > 4.6 4.3 3.9 4.2 4.3 4.6 4.4 4.2 4.7   ALT 54*   < > 25   < >  --    < > 20 56*   < > 19   < > 55* 50 44* 33 86* 54*  --  86* 87*  --  50  --   --   --   --   --   --   --   --    AST 40*   < > 25   < >  --    < > 19 45*   < > 18   < > 39 26 27 20 42* 29  --  49* 34  --  28  --   --   --   --   --   --   --   --    BILITOT 1.0   < > 0.6   < >  --    < > 1.3* 1.0   < > 2.3*   < > 0.4 0.5 0.4 0.5 0.7 0.6  --  0.4 0.3   --  0.5  --   --   --   --   --   --   --   --    LIPASE 40  --  90*  --  105*  --  23 54  --  23  --  42  --   --  43  --   --   --   --   --   --   --   --   --   --   --   --   --   --   --     < > = values in this interval not displayed.     CBC:  Recent Labs     09/27/23  0413 10/10/23  1910 10/11/23  0629 10/12/23  0149 10/13/23  0356 10/14/23  0248 10/15/23  0347 10/16/23  0428   WBC 4.9 6.9 5.1 5.1 6.6 8.5 5.1 5.9   HGB 10.7* 12.9* 11.8* 11.3* 12.9* 13.4* 12.0* 12.8*   HCT 33.4* 40.2* 37.6* 34.9* 41.1 41.4 37.3* 41.0    362 276 266 304 316 242 275   MCV 79* 79* 80 77* 82 78* 78* 81     COAG:   Recent Labs     01/01/23  0613 07/12/23  1701 07/17/23  1543 08/29/23  1846 09/23/23  1815 09/24/23  0448 10/10/23  1910 10/12/23  1732   INR 1.1 1.2* 1.1 1.1 1.0 1.0 1.1 1.0     ABO:   Recent Labs     10/12/23  1732   ABO A     HEME/ENDO:  Recent Labs     06/13/23  0537 08/29/23  1846 10/10/23  1910   FERRITIN  --  239 189   IRONSAT  --  22* 23*   TSH  --  2.05 2.46   HGBA1C 8.5* 6.2*  --       CARDIAC:   Recent Labs     03/04/21  1835 03/08/21  1001 05/26/21  1440 06/03/21  1243 06/19/21  0657 06/27/21  0328 08/03/21  1336 08/05/21  0947 08/16/21  1756 07/07/23  1546 07/12/23  1701 07/12/23  1739 07/15/23  1814 07/15/23  1920 07/17/23  1543 07/17/23  1640 08/29/23  1846 08/30/23  0339 09/05/23  1557 09/23/23  1815 09/23/23  1902 09/23/23 2056 09/25/23  1054 10/10/23  1910 10/12/23  1732 10/13/23  0356 10/13/23  1259    116  --  331*  --  150  --  168  --   --   --   --   --   --   --   --   --   --   --   --   --   --   --   --  153  --   --    TROPHS  --   --   --   --   --   --   --   --    < > 17  --    < > 11   < > 8   < > CANCELED 14 10 38 40 47  --  30  --  25 18   BNP  --   --    < >  --    < >  --    < >  --    < > 768* 161*  --  400*  --  405*  --  204*  --   --  119*  --   --  CANCELED 176*  --   --   --     < > = values in this interval not displayed.     Recent Labs     09/03/23  0503  09/03/23  1058 09/04/23  2225 09/05/23  0534 09/25/23  1836 10/11/23  1519 10/11/23  1830 10/13/23  1809 10/13/23  2334 10/14/23  0551 10/14/23  1112 10/14/23  1348   LACMX 0.8  --  1.1  --  0.6 1.2  --   --   --   --   --  2.0   SO2MV 72   < > 69   < > 63 46   < > 57 59 73 65 49   O2CMX 70.8   < > 67.5   < > 61.9 45.5   < > 55.6 57.1 71.2 63.8 48.4    < > = values in this interval not displayed.     Chest X-ray ( 10/12)  1. Trace bibasilar pleural effusions with atelectasis.  2. Mild perihilar congestion/edema.  3. Medical devices as described above.  CT of Chest (10/12)     1. Mild enlargement of the left ventricle.  2. The sternum lies within 5 mm of the right ventricle.  3. Trace bilateral pleural effusions with atelectasis.  4. Hepatic steatosis.    Vascular US Ankle Brachial Index without exercise (10/12)  Bilateral Lower PVR: No evidence of arterial occlusive disease bilaterally in the lower extremities at rest.  Right Lower PVR: Biphasic flow is noted in the right common femoral artery, right posterior tibial artery and right dorsalis pedis artery.  Left Lower PVR: Biphasic flow is noted in the left common femoral artery, left posterior tibial artery and left dorsalis pedis artery.     Prior to Admission Meds:  Medications Prior to Admission   Medication Sig Dispense Refill Last Dose    acetaminophen (Tylenol) 325 mg tablet Take 2 tablets (650 mg) by mouth every 4 hours if needed for mild pain (1 - 3).   Unknown    ALPRAZolam (Xanax) 2 mg tablet Take 1 tablet (2 mg) by mouth 3 times a day.   10/10/2023    blood sugar diagnostic strip USE TO TEST BEFORE MEALS AND AT BEDTIME (Patient not taking: Reported on 10/11/2023) 100 each 1 Unknown    cholecalciferol (Vitamin D-3) 25 MCG (1000 UT) capsule Take 1 capsule (25 mcg) by mouth once daily in the morning.   10/10/2023    cyanocobalamin (Vitamin B-12) 1,000 mcg tablet Take 100 mcg by mouth once daily.   10/10/2023    diphenhydrAMINE (Benadryl Allergy) 25 mg  tablet Take 1 tablet (25 mg) by mouth as needed at bedtime for allergies.   Unknown    DULoxetine (Cymbalta) 30 mg DR capsule Take 1 capsule (30 mg) by mouth 2 times a day.   10/10/2023    empagliflozin (Jardiance) 10 mg Take 1 tablet (10 mg) by mouth once daily.   10/10/2023    ezetimibe (Zetia) 10 mg tablet Take 1 tablet (10 mg) by mouth once daily.   Unknown    gabapentin (Neurontin) 100 mg capsule TAKE 2 CAPSULES BY MOUTH THREE TIMES A DAY. 180 capsule 3 10/10/2023    hydrOXYzine HCL (Atarax) 50 mg tablet Take 2 tablets (100 mg) by mouth once daily at bedtime.   10/9/2023 at evening    insulin lispro (HumaLOG) 100 unit/mL injection INJECT THREE TIMES A DAY VIA ATTACHED SLIDING SCALE (Patient not taking: Reported on 10/11/2023) 15 mL 1 Not Taking    magnesium oxide (Mag-Ox) 400 mg (241.3 mg magnesium) tablet Take 1 tablet (400 mg) by mouth once daily.   10/10/2023    milrinone in 5 % dextrose (Primacor) 40 mg/200 mL (200 mcg/mL) infusion 0.25 mcg/kg/min intravenous continuously   10/10/2023    polyethylene glycol (Glycolax, Miralax) 17 gram/dose powder Take 17 g by mouth once daily.   10/10/2023    QUEtiapine (SEROquel) 200 mg tablet Take 1 tablet (200 mg) by mouth once daily at bedtime.   10/9/2023 at evening    rivaroxaban (Xarelto) 20 mg tablet 1 tab(s) orally  - Daily 1800   10/10/2023    rOPINIRole (Requip) 2 mg tablet Take 1 tablet (2 mg) by mouth once daily at bedtime.   10/9/2023 at evening    sennosides-docusate sodium (Keyonna-Colace) 8.6-50 mg tablet Take 2 tablets by mouth 2 times a day.   10/10/2023    spironolactone (Aldactone) 25 mg tablet Take 1 tablet (25 mg) by mouth once daily.   10/10/2023    torsemide (Demadex) 100 mg tablet Take 1 tablet (100 mg) by mouth once daily.   10/10/2023    traZODone (Desyrel) 150 mg tablet Take 1 tablet (150 mg) by mouth once daily at bedtime.   10/9/2023 at evening       ASSESSMENT AND PLAN:    Stephan Gallo is a 34 y/o M with PMHx significant for stage D, NYHA  class IV HFrEF (10%) with RV dysfunction s/p ICD (6/2020) now with subcutaneous ICD 2/2 infection, nonischemic cardiomyopathy (2018 2/2 hydrocarbon intoxication), moderate to severe TR, COPD, DAVONTE (not on CPAP), DM2, Anxiety, Depression, PTSD, ADHD, nicotine abuse (current smoker), ETOH abuse, MSSA, GERD, LV thrombus, and PE (on xarelto) who presented to his outpatient clinic visit with Dr. Reeves where he admitted to dyspnea on exertion and significant chest pressure. He was admitted to HFICU 10/10 for further management of Acute Decompensated Heart Failure and potential LVAD therapy work-up. [place]- [place] catheter inserted and with numbers consistent with low output state on his home milrinone dose of 0.25. Milrinone increased to 0.375 mcg/kg/min and advanced therapies email sent 10/12. Milrinone decreased to 0.25 on 10/14 d/t hypotension, but was increased back to 0.375 10/15 when pt. Became normotensive. ABO: A    Significant Events:     10/10: New admission   10/11 RIJ [place]- [place] catheter inserted  10/12: LVAD therapy letter sent out and nipride gtt weaned off with up- titration of hydralazine   10/13: CT of chest obtained  10/14: SGC accidentally pulled and removed     Neuro:  #ADHD  #Depression with history of suicidal ideations   #Chronic pain   #Restless Leg Syndrome   -C/w home ropinirole 2 mg at bedtime   -C/w home quetiapine fumarate 200 mg at bedtime   -C/w home cymbalta 30 mg BID   -Holding home gabapentin 200 TID, patient states it does not help   -C/w home xanax 2 mg TID PRN for anxiety  -C/w home trazodone 150 mg at night  -Hydroxyzine 25 mg every 6 hours PRN  -Tylenol for pain PRN  -Chronic pain consult, ok to give PO oxycodone 5 mg q4 PRN for short term use only  -Will need chronic pain management to establish long term care plan after discharge   -Serial neuro and pain assessments   -PT/OT Consult, OOB to chair  -CAM ICU score every shift  -Sleep/wake cycle normalization     #Physical  Status  -Morbid Obesity- BMI 46     #Substance abuse  History of ETOH abuse (quit in 2020)  Tobacco use/Nicotine Dependence (smokes 1-2 cigarettes daily)  Former cocaine user (quit in his 20s)  Hydrocarbon abuse (2020)  -Tox screen (10/10) positive for benzodiazepines, but patient is prescribed xanax   -Nicotine screen drawn 10/12 and pending results 10/16     Cardiovascular:  #NYHA Class IV, Stage D systolic heart failure NICM/HFrEF (15-20%)  #S/p ICD (2020) c/b infection/ endocarditis replaced with subcutaneous ICD (2022)  - Echo (8/30/23): LVEF 15-20%, LV severely dilated. Global hypokinesis of the LV. Moderately reduced RV function. LA mild-moderately dilated. RA is moderately to severely dilated. Moderately elevated PAP.  - Closing SGC # (9/5/23): BP 93/57 (62), CVP 6, PAP 43/23 (29), PAWP 13, CO/CI 7.0/2.87, , SVO2 71% on Dobutamine 5mcg/kg/min, Entresto 97/103, Empa 10, Carlo 50   Closing SGC # (10/14): BP 94/53 (67) CVP 21, PAP 69/42 (53), CO/CI 5.08/2.07, , SvO2 65% on milrinone 0.375, levophed 0.7, empagliflozin 10 mg and spironolactone 25 mg   - Advance therapy work-up done previously, but was declined due to social and substance abuse hx  - Admit weight 139 kg   - Daily weight (10/15): 153 kg  - Admit   - C/w milrinone 0.375 mcg/kg/min  - HOLD home entresto 24/26 mg BID  - Hydralazine 25 mg Q8 and isordil 10 mg TID   -C/w empagliflozin 10 mg daily   -C/w home spironolactone 25 mg daily   -Holding home metoprolol succinate 25 mg daily  - 40mg IVP lasix once 10/16. Spot diurese as needed.   - SGC accidentally removed 10/14 while patient got up to restroom. No plans to reinsert unless renal function declines.    -Daily standing weights, regular diet, 2L fluid restriction, strict I&Os    #Advanced Therapies Evaluation  LVAD, not candidate for transplant   -Email sent on 10/12, consent signed 10/12   -Labs 10/12  -RHC 10/12  -C 6/22/23  -CPET on milrinone gtt   -ECHO 8/30/23  -CT chest  10/13  -US abdomen/aorta/iliac/IVC 10/12  -Carotid US 10/13  -LEANDER (lower) 10/13   -2 view CXR 10/13  -Device interrogation 10/13  -PFTs to be done today  -Colonoscopy/Occult stool 2/4/21  -LVAD/TX education completed   -CT surgery consult needs completed   -Palliative consult ordered 10/12   -Nutrition consult ordered 10/12  -Dental consult/Orthopantogram completed 10/13 - no lucency   -Physical and Occupational Therapy - consulted and following      # Chronic Chest Pain with multiple ED visits (30-40 visits)  # Pain management consulted and following   - EKG (10/10/23) sinus tachycardia   -Troponin (10/10) 30  - State EKG and troponin lab with negative results (10/13)    #Nonobstructive CAD   [facility] (2020) at [place] negative     #HLD  Lipid panel (8/29/23): cholesterol 238, HDL 32, , VLDL 75,   -C/w home ezetimibe 10 mg daily   -C/w home atorvastatin 40 mg at night      #No history of Arrhythmias  -Initially, ICD placed in 2020. Removed due to infection.  -Subcutaneous ICD in place for primary prevention      #Electrolyte Disturbances  -Keep K>4 and Mag>2     Pulmonary:   #Hx of PE (on Xarelto at home)  #DAVONTE (not on CPAP)  CTA (9/22/23) negative for PE  -C/w heparin gtt, holding home xarelto   -Monitor and maintain SpO2 > 92%  -CPAP at bedtime     GI:  #Nausea  -Bowel regimen   -Zofran PRN      :  #No history of CKD  -Baseline BUN/Cr ~15/0.9  -I/Os  -Avoid hypotension and nephrotoxic agents     #Hx of Epididymal Cystic lesion  US Scrotum w Doppler (3/28/23) A large complex cystic lesion in the left epididymal head is slightly smaller compared to prior imaging with a new echogenic component without internal vascularity. Recommend follow-up as clinically indicated  -Continue to monitor      Heme:  #No active issues   Iron studies (10/10): iron 96, TIBC 409, %sat 23, folate 11.8, ferritin 189, vitamin B12 367     Endo:  #DM2  HgbA1c (8/29) 6.2  -SSI     #Thyroid  TSH (10/10): 2.46       ID:  -Afebrile,  nontoxic, no s/s infx  -Trend temps q4h     Feeding/fluids: Regular  Thromboprophylaxis: SCDs, heparin gtt  VAP bundle: n/a  Ulcer prophylaxis: n/a  Glycemic control: Sliding scale   Bowel care: Colace & miralax PRN  Indwelling catheter: None     PT/OT: following      Code Status: Full Code  Access:  PIVs   [place] catheter(9/7)  SGC RIJ [place] (10/11-10/14)     Family Primary Contact/Next of Kin: Cathy Gallo (mother) [phone]     A. -G. reviewed      Dispo: Remain in HFICU      Patient seen and assessed with Dr. Cordero.    _________________________________________________  Toño Pandya DO PGY-4  Heart Failure Fellow    Brief Attending Summary:   HF cardiologist: Lala    Mr. Gallo is a 33M with a PMHx sig for stage D systolic HF/NICM/HFrEF with severe biventricular dysfunction currently on inotropic support (milrinone) s/p S-ICD, DM, DAVONTE, and obesity who was admitted with cardiogenic shock.     PAC removed over the weekend. Intact end organ function. Completing his adv therapy evaluation.     Plan:  -c/w milrinone  -plan to discuss at selection tomorrow morning for LVAD      I have reviewed and evaluated the most recent data and results, personally examined the patient, and formulated the plan of care as presented above. This patient was critically ill and required continued critical care treatment. Teaching and any separately billable procedures are not included in the time calculation.    Billing Provider Critical Care Time: 55  minutes

## 2023-10-16 NOTE — CARE PLAN
Problem: Balance  Goal: Pt will score >45 on JALLOH for safe community ambulation  Outcome: Progressing     Problem: Mobility  Goal: Complete 6MWT, stable vitals, with RPD 3/10 or less, RPE 13/20 or less  Outcome: Progressing  Goal: amb x 350 ft, no rest breaks, with stable vitals , and LRD  Outcome: Progressing     Problem: Transfers  Goal: sit<>stand indep  Outcome: Progressing  Goal: Pt will complete 5XSTS from lowest bed height without UE assist <12 seconds with RPD </=3/10, RPE </= 13/20 for improved functional mobility.  Outcome: Progressing
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
## 2023-10-16 NOTE — PROGRESS NOTES
Occupational Therapy    Evaluation    Patient Name: Stephan Gallo  MRN: [id number]  Today's Date: 10/16/2023  Time Calculation  Start Time: 1121  Stop Time: 1149  Time Calculation (min): 28 min        Assessment:  OT Assessment: Patient is a 33 year old male who presents with impaired ADL, functional mobility, functional transfer, UE strength, UE . cognition.  He would benefit from skilled OT services while in hospital. Anticipate need for continued services post discharge but unable to provide discharge recommendation due to patient refusal for OOB activity.  Prognosis: Fair  End of Session Communication: Bedside nurse  End of Session Patient Position: Bed, 3 rail up  Prognosis: Fair  Plan:  Treatment Interventions: ADL retraining, Functional transfer training, UE strengthening/ROM, Cognitive reorientation  OT Frequency: 3 times per week  OT Discharge Recommendations:  (unable to determine, additional mobility indicated)  Treatment Interventions: ADL retraining, Functional transfer training, UE strengthening/ROM, Cognitive reorientation    Subjective   Current Problem:  1. Acute decompensated heart failure (CMS/HCC)        2. Heart failure with reduced ejection fraction (CMS/HCC)  Vascular US carotid artery duplex bilateral    Cardiac device check - Inpatient    Vascular US carotid artery duplex bilateral    Cardiac device check - Inpatient    Vascular US Ankle Brachial Index (LEANDER) Without Exercise    Vascular US Ankle Brachial Index (LEANDER) Without Exercise    CANCELED: Vascular US aorta iliac duplex limited    CANCELED: Vascular US lower extremity arterial duplex bilateral with LEANDER    CANCELED: Vascular US aorta iliac duplex limited    CANCELED: Vascular US lower extremity arterial duplex bilateral with LEANDER      3. Peripheral vascular disease, unspecified (CMS/HCC)  Vascular US Ankle Brachial Index (LEANDER) Without Exercise      4. Other specified symptoms and signs involving the circulatory and  respiratory systems  Vascular US Ankle Brachial Index (LEANDER) Without Exercise        General:  General  Reason for Referral: LUTHER, LVAD work up  Past Medical History Relevant to Rehab: stage D, NYHA class IV HFrEF (10%) with RV dysfunction s/p ICD (6/2020, moderate to severe TR, COPD, DAVONTE (not on CPAP), DM2, Anxiety, Depression, PTSD, ADHD, nicotine abuse (current smoker), ETOH abuse, MSSA, GERD, LV thrombus, and PE (on xarelto)  Family/Caregiver Present: No  Prior to Session Communication: Physician  Patient Position Received: Bed, 3 rail up  Precautions:     Vital Signs:  Heart Rate: (!) 119  SpO2: 93 %  BP: 122/60  MAP (mmHg): 76  Pain:  Pain Assessment  Pain Score: 8  Pain Location:  (chest and back)  Pain Frequency: Constant/continuous    Objective   Cognition:  Overall Cognitive Status: Within Functional Limits  Cognition Test Scores  Cognition Tests: Cognition Test Performed  Confusion Assessment Method (CAM)  Acute Onset and Fluctuating Course (1A): No     Home Living:  Type of Home: Apartment  Lives With:  (alone with pet cat)  Home Adaptive Equipment:  (Patient reports occasional use of walker or cane)  Entrance Stairs-Number of Steps: 14 DARRYN  Bathroom Shower/Tub: Tub/shower unit  Prior Function:  Level of [place]: Independent with ADLs and functional transfers, Independent with homemaking with ambulation  Receives Help From:  (mom- transportation)  Hand Dominance: Right  IADL History:     ADL:  Eating Assistance: Independent  Grooming Assistance:  (set up)  Bathing Assistance:  (Patient declined ADL. He reports independent post set up.)  UE Dressing Assistance: Minimal  LE Dressing Assistance:  (Patient refused, he reports independent post set up.)  Toileting Assistance with Device:  (declined by patient, patient independent to use urinal)  Activity Tolerance:  Endurance:  (poor)  Bed Mobility/Transfers: Bed Mobility  Bed Mobility:  (refused by patient)   and Transfers  Transfer:  (refused by patient)    Modalities:     Vision:Vision - Basic Assessment  Current Vision: No visual deficits  [place] Function:  Gross Grasp:  (right: 75, 70, 84 av.3    left: 92,93,86 av.3)  Coordination: Functional  Extremities: RUE AROM (degrees)  RUE AROM Comment: WFL  RUE Strength  RUE Overall Strength:  (4/5 overall) and          Outcome Measures:[facility] Daily Activity  Putting on and taking off regular lower body clothing: A little  Bathing (including washing, rinsing, drying): A little  Putting on and taking off regular upper body clothing: A little  Toileting, which includes using toilet, bedpan or urinal: A little  Taking care of personal grooming such as brushing teeth: A little  Eating Meals: None  Daily Activity - Total Score: 19         and MoCA  Visuospatial/Executive: 4  Naming: 3  Memory (Score '0' as this is an Unscored Section): 0  Attention: Read List of Digits: 1  Attention: Read List of Letters: 1  Attention: Serial Sevens: 3  Language: Repeat: 1  Language: Fluency: 0  Abstraction: 2  Delayed Recall: 4  Orientation: 5  Add 1 Point if </=12 yr Education: 0  MOCA Total Score: 24    Education Documentation  Home Exercise Program, taught by Deb Rollins OT at 10/16/2023 12:18 PM.  Learner: Patient  Readiness: Acceptance  Method: Explanation  Response: Verbalizes Understanding  Comment: Patient instructed in benefit of OOB activity, role of OT.    Education Comments  No comments found.               Goals:  Encounter Problems       Encounter Problems (Active)       ADLs       Patient will perform UB and LB bathing  with independent level of assistance and raised toilet seat. (Progressing)       Start:  10/16/23    Expected End:  10/30/23            Patient with complete upper body dressing with independent level of assistance donning and doffing all UE clothes with no adaptive equipment while sitting or standing. (Progressing)       Start:  10/16/23    Expected End:  10/30/23            Patient with complete  lower body dressing with independent level of assistance donning and doffing all LE clothes  with no adaptive equipment while sitting/standing. (Progressing)       Start:  10/16/23    Expected End:  10/30/23            Patient will complete daily grooming tasks  with independent level of assistance and PRN adaptive equipment while standing sink side and completing set up. (Progressing)       Start:  10/16/23    Expected End:  10/30/23                                       COGNITION/SAFETY       Patient will score WFL on standardized cognitive assessment without cues and within reasonable time frame (Progressing)       Start:  10/16/23    Expected End:  10/30/23               EXERCISE/STRENGTHENING       Patient with increase BUE stregnth to 4+/4 and bilateral  by 5 pound bilateral UE  (Progressing)       Start:  10/16/23    Expected End:  10/30/23               MOBILITY       Patient will perform Functional mobility  Household distances/Community Distances with independent level of assistance and least restrictive device in order to improve safety and functional mobility. (Progressing)       Start:  10/16/23    Expected End:  10/30/23                                                       TRANSFERS       Patient will perform bed mobility independent level of assistance and bed rails in order to improve safety and independence with mobility (Progressing)       Start:  10/16/23    Expected End:  10/30/23            Patient will complete functional transfer to all surfaces with least restrictive device with independent level of assistance. (Progressing)       Start:  10/16/23    Expected End:  10/30/23

## 2023-10-17 NOTE — PROGRESS NOTES
Physical Therapy                 Therapy Communication Note    Patient Name: Stephan Gallo  MRN: [id number]  Today's Date: 10/17/2023     Discipline: Physical Therapy    Missed Visit Reason: Missed Visit Reason: Patient refused    Missed Time: Attempt; 14:10    Comment:

## 2023-10-17 NOTE — COMMITTEE REVIEW
[facility] Advanced Heart Failure Therapeutics Committee    Patient's name: Stephan Gallo  Referring provider: Dr. Reeves / Dr. Jean  Primary HF cardiologist: Dr. Reeves    Primary diagnosis: NICM    Background information: 34 y/o M w PMHx significant for stage D, HFrEF (10%), NYHA class IV due to nonischemic cardiomyopathy (2018 2/2 hydrocarbon intoxication). History of ICD (6/2020), which was eventually extracted due to MSSA infection, and now with subcutaneous ICD. History of obesity, DM2, DAVONTE, nicotine use (current smoker), ETOH abuse, GERD, and PE (on xarelto). Further, he has a complex psychiatric history including anxiety, depression, PTSD, ADHD. He had several hospitalizations for self-injurious behavior and/or suicidal ideation.    Committee decision and plan of care (Please discuss modifiable/non-modifiable barriers, plan to address barriers, and any relevant plan for reassessment if applicable): Not approved for LVAD due to history of depression/anxiety leading to self-injurious behavior and suicidal ideation requiring ER visits and hospitalizations. Committee reviewed the recommendations made by the inpatient psychiatry and PM services. Committee expressed concerns for (1) further decompensation of psychiatric state in the immediate tracy-operative period, (2) ongoing pain in chest requiring narcotics that will not be relieved by LVAD and necessary cardiac surgery, (3) potential for patient self-deactivating LVAD if he were to have future psychiatric decompensation that may lead to death.

## 2023-10-17 NOTE — PROGRESS NOTES
HFICU PROGRESS NOTE    Stephan Gallo/[id number]    Admit Date: 10/10/2023  Hospital Length of Stay: 7   ICU Length of Stay: 6d 18h   Primary HF Cardiologist: Dr. Reeves    Subjective     Event: Overnight, no acute events. Pt. Remains hemodynamically supported on milrinone at 0.375mcg/kg/min.    Pt is alert, oriented x 3, moves all extremities. Patient states he is uncomfortable and in pain. Patient denies nausea, vomiting and abdominal pain.     Presented at advanced therapy selection committee, he is formally declined for LVAD at this time. Will offer referral for second opinion at [facility] vs pursue hospice.     Plan:  - C/w milrinone gtt @ 0.375  - Lasix 40 mg once  - Optimize PO GDMT     MEDICATIONS  Infusions:  heparin, Last Rate: 1,700 Units/hr (10/17/23 0823)  lactated Ringer's, Last Rate: 10 mL/hr (10/17/23 0334)  milrinone, Last Rate: 0.375 mcg/kg/min (10/17/23 0634)      Scheduled:  atorvastatin, 40 mg, Nightly  DULoxetine, 30 mg, BID  empagliflozin, 10 mg, Daily  ezetimibe, 10 mg, Daily  hydrALAZINE, 25 mg, q8h  insulin lispro, 0-5 Units, TID with meals  isosorbide dinitrate, 10 mg, TID  polyethylene glycol, 17 g, Daily  potassium chloride CR, 20 mEq, Once  QUEtiapine, 200 mg, Nightly  rOPINIRole, 2 mg, Daily  [Held by provider] sacubitriL-valsartan, 1 tablet, BID  spironolactone, 25 mg, Daily      PRN:  acetaminophen, 975 mg, q6h PRN   Or  acetaminophen, 650 mg, q6h PRN   Or  acetaminophen, 650 mg, q6h PRN  ALPRAZolam, 2 mg, q8h PRN  dextrose 10 % in water (D10W), 0.3 g/kg/hr, Once PRN  dextrose, 25 g, q15 min PRN  diphenhydrAMINE, 25 mg, q6h PRN  glucagon, 1 mg, q15 min PRN  heparin, 5,000-10,000 Units, q4h PRN  hydrOXYzine HCL, 25 mg, q6h PRN  ondansetron, 4 mg, q6h PRN  oxyCODONE, 5 mg, q4h PRN  oxygen, , Continuous PRN - O2/gases  traZODone, 150 mg, Nightly PRN      Invasive Hemodynamics:    Most Recent Range Past 24hrs   BP (Art)   No data recorded   MAP(Art)   No data recorded   RA/CVP 15  "mmHg No data recorded   PA 63/28 No data recorded   PA(mean) 40 mmHg No data recorded   PCWP 22 mmHg No data recorded   CO 3.6 L/min No data recorded   CI 1.47 L/min/m2 No data recorded   Mixed Venous 73 % No data recorded   SVR  1155 (dyne*sec)/cm5 No data recorded    (dyne*sec)/cm5 No data recorded     PHYSICAL EXAM:   Visit Vitals  BP 98/85   Pulse (!) 122   Temp 35.3 °C (95.5 °F) (Temporal)   Resp 14   Ht 1.727 m (5' 7.99\")   Wt 138 kg (305 lb 5.4 oz)   SpO2 97%   BMI 46.44 kg/m²   Smoking Status Every Day   BSA 2.58 m²     Wt Readings from Last 5 Encounters:   10/17/23 138 kg (305 lb 5.4 oz)   10/10/23 139 kg (307 lb)   12/08/22 133 kg (293 lb)   11/10/22 133 kg (292 lb 12.8 oz)   04/19/22 120 kg (264 lb 2 oz)     INTAKE/OUTPUT:  I/O last 3 completed shifts:  In: 1532.3 (11.1 mL/kg) [I.V.:1532.3 (11.1 mL/kg)]  Out: 4050 (29.2 mL/kg) [Urine:4050 (0.8 mL/kg/hr)]  Weight: 138.5 kg      Physical Exam  Constitutional:       General: He is not in acute distress.     Appearance: He is obese. He is ill-appearing.   Neck:      Vascular: No hepatojugular reflux or JVD.   Cardiovascular:      Rate and Rhythm: Normal rate and regular rhythm.      Pulses: Normal pulses.      Heart sounds: S1 normal and S2 normal.   Pulmonary:      Effort: Pulmonary effort is normal.      Breath sounds: Normal breath sounds. No decreased breath sounds, wheezing, rhonchi or rales.   Abdominal:      General: Bowel sounds are normal. There is distension.      Palpations: Abdomen is soft. There is no hepatomegaly or splenomegaly.      Tenderness: There is no abdominal tenderness.   Genitourinary:     Comments: Voiding freely   Musculoskeletal:      Cervical back: Normal range of motion.      Right lower leg: No edema.      Left lower leg: No edema.   Neurological:      General: No focal deficit present.      Mental Status: He is alert and oriented to person, place, and time. Mental status is at baseline.       DATA:  CMP:  Recent Labs     " 09/26/23  0330 09/27/23  0413 10/10/23  1910 10/11/23  0629 10/12/23  0149 10/12/23  1118 10/13/23  0356 10/14/23  0248 10/14/23  1341 10/15/23  0347 10/16/23  0428 10/17/23  0426    140 140 139 137 140 139 137 134* 136 134* 134*   K 4.0 4.4 3.8 3.4* 3.7 4.2 3.9 3.9 4.5 4.6 4.3 4.5    105 100 100 99 100 99 95* 94* 98 94* 95*   CO2 29 28 29 28 29 29 30 28 29 28 29 31   ANIONGAP 13 11 15 14 13 15 14 18 16 15 15 13   BUN 17 15 16 18 17 19 18 20 21 20 19 20   CREATININE 0.96 0.83 1.01 0.91 1.01 1.30 1.14 1.46* 1.22 1.13 1.04 1.15   EGFR  --   --  >90 >90 >90 74 87 64 80 87 >90 86   MG 2.32 2.10 2.08 1.92 2.10  --  2.09 1.97  --  2.46* 2.19 2.32     Recent Labs     02/18/21  2245 02/19/21  1725 09/09/21  1334 09/10/21  2045 10/02/21  1738 10/03/21  0625 10/10/21  0354 10/12/21  0018 10/14/21  0834 11/03/21  2241 11/05/21  0907 07/15/23  1814 07/17/23  1543 07/19/23  2308 07/31/23  1731 08/12/23  1602 08/29/23  1846 08/30/23  0337 09/23/23  1815 09/24/23  0448 09/24/23  1430 10/10/23  1910 10/11/23  0629 10/12/23  0149 10/12/23  1118 10/13/23  0356 10/14/23  0248 10/14/23  1341 10/15/23  0347 10/16/23  0428 10/17/23  0426   ALBUMIN 4.3   < > 4.7   < >  --    < > 4.3 4.4   < > 4.5   < > 4.4 4.4 4.6 4.7 4.8 4.5   < > 4.3 4.4   < > 4.6   < > 3.9 4.2 4.3 4.6 4.4 4.2 4.7 4.6   ALT 54*   < > 25   < >  --    < > 20 56*   < > 19   < > 55* 50 44* 33 86* 54*  --  86* 87*  --  50  --   --   --   --   --   --   --   --   --    AST 40*   < > 25   < >  --    < > 19 45*   < > 18   < > 39 26 27 20 42* 29  --  49* 34  --  28  --   --   --   --   --   --   --   --   --    BILITOT 1.0   < > 0.6   < >  --    < > 1.3* 1.0   < > 2.3*   < > 0.4 0.5 0.4 0.5 0.7 0.6  --  0.4 0.3  --  0.5  --   --   --   --   --   --   --   --   --    LIPASE 40  --  90*  --  105*  --  23 54  --  23  --  42  --   --  43  --   --   --   --   --   --   --   --   --   --   --   --   --   --   --   --     < > = values in this interval not displayed.      CBC:  Recent Labs     10/10/23  1910 10/11/23  0629 10/12/23  0149 10/13/23  0356 10/14/23  0248 10/15/23  0347 10/16/23  0428 10/17/23  0426   WBC 6.9 5.1 5.1 6.6 8.5 5.1 5.9 6.1   HGB 12.9* 11.8* 11.3* 12.9* 13.4* 12.0* 12.8* 12.9*   HCT 40.2* 37.6* 34.9* 41.1 41.4 37.3* 41.0 39.8*    276 266 304 316 242 275 271   MCV 79* 80 77* 82 78* 78* 81 77*     COAG:   Recent Labs     01/01/23  0613 07/12/23  1701 07/17/23  1543 08/29/23  1846 09/23/23  1815 09/24/23  0448 10/10/23  1910 10/12/23  1732   INR 1.1 1.2* 1.1 1.1 1.0 1.0 1.1 1.0     ABO:   Recent Labs     10/12/23  1732   ABO A     HEME/ENDO:  Recent Labs     06/13/23  0537 08/29/23  1846 10/10/23  1910   FERRITIN  --  239 189   IRONSAT  --  22* 23*   TSH  --  2.05 2.46   HGBA1C 8.5* 6.2*  --       CARDIAC:   Recent Labs     03/04/21  1835 03/08/21  1001 05/26/21  1440 06/03/21  1243 06/19/21  0657 06/27/21  0328 08/03/21  1336 08/05/21  0947 08/16/21  1756 07/07/23  1546 07/12/23  1701 07/12/23  1739 07/15/23  1814 07/15/23  1920 07/17/23  1543 07/17/23  1640 08/29/23  1846 08/30/23  0339 09/05/23  1557 09/23/23  1815 09/23/23  1902 09/23/23  2056 09/25/23  1054 10/10/23  1910 10/12/23  1732 10/13/23  0356 10/13/23  1259    116  --  331*  --  150  --  168  --   --   --   --   --   --   --   --   --   --   --   --   --   --   --   --  153  --   --    TROPHS  --   --   --   --   --   --   --   --    < > 17  --    < > 11   < > 8   < > CANCELED 14 10 38 40 47  --  30  --  25 18   BNP  --   --    < >  --    < >  --    < >  --    < > 768* 161*  --  400*  --  405*  --  204*  --   --  119*  --   --  CANCELED 176*  --   --   --     < > = values in this interval not displayed.     Recent Labs     09/03/23  0501 09/03/23  1058 09/04/23  2225 09/05/23  0534 09/25/23  1836 10/11/23  1519 10/11/23  1830 10/13/23  1809 10/13/23  2334 10/14/23  0551 10/14/23  1112 10/14/23  1348   LACMX 0.8  --  1.1  --  0.6 1.2  --   --   --   --   --  2.0   SO2MV 72   < > 69    < > 63 46   < > 57 59 73 65 49   O2CMX 70.8   < > 67.5   < > 61.9 45.5   < > 55.6 57.1 71.2 63.8 48.4    < > = values in this interval not displayed.     Chest X-ray ( 10/12)  1. Trace bibasilar pleural effusions with atelectasis.  2. Mild perihilar congestion/edema.  3. Medical devices as described above.  CT of Chest (10/12)     1. Mild enlargement of the left ventricle.  2. The sternum lies within 5 mm of the right ventricle.  3. Trace bilateral pleural effusions with atelectasis.  4. Hepatic steatosis.    Vascular US Ankle Brachial Index without exercise (10/12)  Bilateral Lower PVR: No evidence of arterial occlusive disease bilaterally in the lower extremities at rest.  Right Lower PVR: Biphasic flow is noted in the right common femoral artery, right posterior tibial artery and right dorsalis pedis artery.  Left Lower PVR: Biphasic flow is noted in the left common femoral artery, left posterior tibial artery and left dorsalis pedis artery.     Prior to Admission Meds:  Medications Prior to Admission   Medication Sig Dispense Refill Last Dose    acetaminophen (Tylenol) 325 mg tablet Take 2 tablets (650 mg) by mouth every 4 hours if needed for mild pain (1 - 3).   Unknown    ALPRAZolam (Xanax) 2 mg tablet Take 1 tablet (2 mg) by mouth 3 times a day.   10/10/2023    blood sugar diagnostic strip USE TO TEST BEFORE MEALS AND AT BEDTIME (Patient not taking: Reported on 10/11/2023) 100 each 1 Unknown    cholecalciferol (Vitamin D-3) 25 MCG (1000 UT) capsule Take 1 capsule (25 mcg) by mouth once daily in the morning.   10/10/2023    cyanocobalamin (Vitamin B-12) 1,000 mcg tablet Take 100 mcg by mouth once daily.   10/10/2023    diphenhydrAMINE (Benadryl Allergy) 25 mg tablet Take 1 tablet (25 mg) by mouth as needed at bedtime for allergies.   Unknown    DULoxetine (Cymbalta) 30 mg DR capsule Take 1 capsule (30 mg) by mouth 2 times a day.   10/10/2023    empagliflozin (Jardiance) 10 mg Take 1 tablet (10 mg) by mouth  once daily.   10/10/2023    ezetimibe (Zetia) 10 mg tablet Take 1 tablet (10 mg) by mouth once daily.   Unknown    gabapentin (Neurontin) 100 mg capsule TAKE 2 CAPSULES BY MOUTH THREE TIMES A DAY. 180 capsule 3 10/10/2023    hydrOXYzine HCL (Atarax) 50 mg tablet Take 2 tablets (100 mg) by mouth once daily at bedtime.   10/9/2023 at evening    insulin lispro (HumaLOG) 100 unit/mL injection INJECT THREE TIMES A DAY VIA ATTACHED SLIDING SCALE (Patient not taking: Reported on 10/11/2023) 15 mL 1 Not Taking    magnesium oxide (Mag-Ox) 400 mg (241.3 mg magnesium) tablet Take 1 tablet (400 mg) by mouth once daily.   10/10/2023    milrinone in 5 % dextrose (Primacor) 40 mg/200 mL (200 mcg/mL) infusion 0.25 mcg/kg/min intravenous continuously   10/10/2023    polyethylene glycol (Glycolax, Miralax) 17 gram/dose powder Take 17 g by mouth once daily.   10/10/2023    QUEtiapine (SEROquel) 200 mg tablet Take 1 tablet (200 mg) by mouth once daily at bedtime.   10/9/2023 at evening    rivaroxaban (Xarelto) 20 mg tablet 1 tab(s) orally  - Daily 1800   10/10/2023    rOPINIRole (Requip) 2 mg tablet Take 1 tablet (2 mg) by mouth once daily at bedtime.   10/9/2023 at evening    sennosides-docusate sodium (Keyonna-Colace) 8.6-50 mg tablet Take 2 tablets by mouth 2 times a day.   10/10/2023    spironolactone (Aldactone) 25 mg tablet Take 1 tablet (25 mg) by mouth once daily.   10/10/2023    torsemide (Demadex) 100 mg tablet Take 1 tablet (100 mg) by mouth once daily.   10/10/2023    traZODone (Desyrel) 150 mg tablet Take 1 tablet (150 mg) by mouth once daily at bedtime.   10/9/2023 at evening       ASSESSMENT AND PLAN:    Stephan Gallo is a 34 y/o M with PMHx significant for stage D, NYHA class IV HFrEF (10%) with RV dysfunction s/p ICD (6/2020) now with subcutaneous ICD 2/2 infection, nonischemic cardiomyopathy (2018 2/2 hydrocarbon intoxication), moderate to severe TR, COPD, DAVONTE (not on CPAP), DM2, Anxiety, Depression, PTSD, ADHD,  nicotine abuse (current smoker), ETOH abuse, MSSA, GERD, LV thrombus, and PE (on xarelto) who presented to his outpatient clinic visit with Dr. Reeves where he admitted to dyspnea on exertion and significant chest pressure. He was admitted to HFICU 10/10 for further management of Acute Decompensated Heart Failure and potential LVAD therapy work-up. [place] catheter inserted and with numbers consistent with low output state on his home milrinone dose of 0.25. Milrinone increased to 0.375 mcg/kg/min and advanced therapies email sent 10/12. Milrinone decreased to 0.25 on 10/14 d/t hypotension, but was increased back to 0.375 10/15 when pt. Became normotensive. ABO: A    Neuro:  #ADHD  #Depression with history of suicidal ideations   #Chronic pain   #Restless Leg Syndrome   -C/w home ropinirole 2 mg at bedtime   -C/w home quetiapine fumarate 200 mg at bedtime   -C/w home cymbalta 30 mg BID   -Holding home gabapentin 200 TID, patient states it does not help   -C/w home xanax 2 mg TID PRN for anxiety  -C/w home trazodone 150 mg at night  -Hydroxyzine 25 mg every 6 hours PRN  -Tylenol for pain PRN  -Chronic pain consult, ok to give PO oxycodone 5 mg q4 PRN for short term use only  -Will need chronic pain management to establish long term care plan after discharge   -Serial neuro and pain assessments   -PT/OT Consult, OOB to chair  -CAM ICU score every shift  -Sleep/wake cycle normalization     #Physical Status  -Morbid Obesity- BMI 46     #Substance abuse  History of ETOH abuse (quit in 2020)  Tobacco use/Nicotine Dependence (smokes 1-2 cigarettes daily)  Former cocaine user (quit in his 20s)  Hydrocarbon abuse (2020)  -Tox screen (10/10) positive for benzodiazepines, but patient is prescribed xanax   -Nicotine screen drawn 10/12 and pending results     Cardiovascular:  #NYHA Class IV, Stage D systolic heart failure NICM/HFrEF (15-20%)  #S/p ICD (2020) c/b infection/ endocarditis replaced with subcutaneous ICD  (2022)  - Echo (8/30/23): LVEF 15-20%, LV severely dilated. Global hypokinesis of the LV. Moderately reduced RV function. LA mild-moderately dilated. RA is moderately to severely dilated. Moderately elevated PAP.  Closing SGC # (10/14): BP 94/53 (67) CVP 21, PAP 69/42 (53), CO/CI 5.08/2.07, , SvO2 65% on milrinone 0.375, levophed 0.7, empagliflozin 10 mg and spironolactone 25 mg   - Admit weight 139 kg   - Daily weight (10/15): 153 kg  - Admit   - C/w milrinone 0.375 mcg/kg/min  - Resume home entresto 24/26 mg BID  - Discontinue Hydralazine 25 mg Q8 and isordil 10 mg TID   -C/w empagliflozin 10 mg daily   -C/w home spironolactone 25 mg daily   -Holding home metoprolol succinate 25 mg daily  - 40mg IVP lasix once 10/17. Spot diurese as needed.   -Daily standing weights, regular diet, 2L fluid restriction, strict I&Os    #Advanced Therapies Evaluation  -Completed workup, presented at selection committee today 10/17. Formally declined for LVAD. Will offer referral to second opinion at [facility].   -Discussed with him and his mother at bedside about hospice care. Will let them think about his goals of care for now.     # Chronic Chest Pain with multiple ED visits (30-40 visits)  # Pain management consulted and following   - EKG (10/10/23) sinus tachycardia   -Troponin (10/10) 30  - State EKG and troponin lab with negative results (10/13)    #Nonobstructive CAD   [facility] (2020) at [facility] negative     #HLD  Lipid panel (8/29/23): cholesterol 238, HDL 32, , VLDL 75,   -C/w home ezetimibe 10 mg daily   -C/w home atorvastatin 40 mg at night      #No history of Arrhythmias  -Initially, ICD placed in 2020. Removed due to infection.  -Subcutaneous ICD in place for primary prevention      #Electrolyte Disturbances  -Keep K>4 and Mag>2     Pulmonary:   #Hx of PE (on Xarelto at home)  #DAVONTE (not on CPAP)  CTA (9/22/23) negative for PE  -Discontinue heparin, resume home xarelto tonight   -Monitor and maintain SpO2 >  92%  -CPAP at bedtime     GI:  #Nausea  -Bowel regimen   -Zofran PRN      :  #No history of CKD  -Baseline BUN/Cr ~15/0.9  -I/Os  -Avoid hypotension and nephrotoxic agents     #Hx of Epididymal Cystic lesion  US Scrotum w Doppler (3/28/23) A large complex cystic lesion in the left epididymal head is slightly smaller compared to prior imaging with a new echogenic component without internal vascularity. Recommend follow-up as clinically indicated  -Continue to monitor      Heme:  #No active issues   Iron studies (10/10): iron 96, TIBC 409, %sat 23, folate 11.8, ferritin 189, vitamin B12 367     Endo:  #DM2  HgbA1c (8/29) 6.2  -SSI     #Thyroid  TSH (10/10): 2.46       ID:  -Afebrile, nontoxic, no s/s infx  -Trend temps q4h     Feeding/fluids: Regular  Thromboprophylaxis: SCDs, xarelto  VAP bundle: n/a  Ulcer prophylaxis: n/a  Glycemic control: Sliding scale   Bowel care: Colace & miralax PRN  Indwelling catheter: None     PT/OT: following      Code Status: Full Code  Access:  PIVs   Enrrique catheter(9/7)  SGC RIJ Cordis (10/11-10/14)     Family Primary Contact/Next of Kin: Cathy Gallo (mother) [phone]     A. -G. reviewed      Dispo: Remain in HFICU while optimizing PO meds, dependent on IV milrinone.     Patient seen and assessed with Dr. Cordero.  _________________________________________________  Toño Pandya DO PGY-4  Heart Failure Fellow

## 2023-10-17 NOTE — LETTER
October 17, 2023    Stephan Gallo  [address] [facility] [address]      Dear Mr. Gallo:    Our multi-disciplinary transplant team completed a review of your medical records on 10/17/2023.  I regret to inform you that the decision was not to proceed with placing you on the [facility] ([facility]) waiting list for a Heart transplant.    Our transplant program consists of surgeons and medical doctors who provide coverage 365 days a year, 24 hours a day.     If you have any questions or concerns regarding your insurance coverage or billing issues, a  is available to speak with you.     It is important to keep us updated of any major changes in your medical condition, contact information and health insurance coverage.     Please don't hesitate to contact us at Dept: [phone] with any questions or concerns. We look forward to working with you through this process.      Sincerely,      Puja Diego RN          The [facility] Toll-free Patient Services Line:  Your Resource for Organ Transplant Information    If you have a question regarding your own medical care, you always should call your transplant hospital first. However, for general organ transplant-related information, you should call the [facility] ([facility]) toll-free patient services line at [phone].  Anyone, including potential transplant candidates, candidates, recipients, family members, friends, living donors, and donor family members, can call this number to:    Talk about organ donation, living donation, the transplant process, the donation process, and transplant policies.  Get a free patient information kit with helpful booklets, waiting list and transplant information, and a list of all transplant hospitals.  Ask questions about the Organ Procurement and Transplantation Network (OPTN) web site (http://optn.transplant.hrsa.gov/), the [facility]OS Web site (http://unos.org/), or  the UNOS web site for living donors and transplant recipients. (http://www.transplantliving.org/).  Learn how [facility] and the OPTN can help you.  Talk about any concerns that you may have with a transplant hospital.    [facility] is a not-for-profit organization that provides the administrative services for the national OPTN under federal contract to the Health Resources and Services Administration (HRSA), an agency under the U.S. Department of Health and Human Services (HHS).    [facility] and the OPTN are responsible for:    Providing educational material for patients, the public, and professionals.  Raising awareness of the need for donated organs and tissue.  Writing organ transplant policy with help from transplant professionals, transplant patients, transplant candidates, donor families, living donors, and the public.  Coordinating organ procurement, matching, and placement.  Collecting information about every organ transplant and donation that occurs in the [place].    Remember, you should contact your transplant hospital directly if you have questions or concerns about your own medical care including medical records, work-up progress, and test results.    [facility] is not your transplant hospital, and staff at [facility] will not be able to transfer you to your transplant hospital, so keep your transplant hospital’s phone number handy.    However, while you research your transplant needs and learn as much as you can about transplantation and donation, we welcome your call to our toll-free patient services line at [phone].      [facility] PIL Final Rev 1-

## 2023-10-17 NOTE — PROGRESS NOTES
SOCIAL WORK NOTE   SW met with team for rounds. Patient is not currently a candidate for additional enhanced therapies. Current ongoing GOC discussions vs consideration for second option. Social work to follow.  MERLY Angela, LISW-S ([id number])

## 2023-10-18 NOTE — PROCEDURES
Arterial Line Insertion    Date/Time: 10/18/2023 11:43 AM    Performed by: Renaldo Dunlap PA-C  Authorized by: Renaldo Dunlap PA-C    Consent:     Consent obtained:  Written    Consent given by:  Patient    Risks, benefits, and alternatives were discussed: yes      Risks discussed:  Bleeding, infection, pain and ischemia  Universal protocol:     Procedure explained and questions answered to patient or proxy's satisfaction: yes      Relevant documents present and verified: yes      Test results available: yes      Imaging studies available: yes      Required blood products, implants, devices, and special equipment available: yes      Site/side marked: yes      Immediately prior to procedure, a time out was called: yes      Patient identity confirmed:  Verbally with patient, hospital-assigned identification number and arm band  Indications:     Indications: hemodynamic monitoring    Pre-procedure details:     Skin preparation:  Chlorhexidine    Preparation: Patient was prepped and draped in sterile fashion    Sedation:     Sedation type:  None  Anesthesia:     Anesthesia method:  Local infiltration    Local anesthetic:  Lidocaine 1% WITH epi  Procedure details:     Location:  L radial    Erasmo's test performed: no      Needle gauge:  20 G    Placement technique:  Seldinger and ultrasound guided    Number of attempts:  1    Transducer: waveform confirmed    Post-procedure details:     Post-procedure:  Sutured, sterile dressing applied and biopatch applied    CMS:  Normal    Procedure completion:  Tolerated well, no immediate complications

## 2023-10-18 NOTE — PROGRESS NOTES
HFICU PROGRESS NOTE    Stephan Gallo/[id number]    Admit Date: 10/10/2023  Hospital Length of Stay: 8   ICU Length of Stay: 7d 20h   Primary HF Cardiologist: Dr. Reeves    Subjective     Event: Overnight, becaome hypotensive requiring norepinephrin to be resumed. Entresto held this AM. Remains hemodynamically supported on milrinone at 0.375mcg/kg/min.    Discussed with him if he has spoken about hospice options with his mother. He did not want to discuss things at this time.      Plan:  - C/w milrinone gtt @ 0.375  - Arterial line placed today  -Wean norepinephrine off  -Palliative to assist with GOC     MEDICATIONS  Infusions:  lactated Ringer's, Last Rate: 10 mL/hr (10/18/23 1200)  milrinone, Last Rate: 0.375 mcg/kg/min (10/18/23 1243)  norepinephrine, Last Rate: 0.1 mcg/kg/min (10/18/23 1200)      Scheduled:  atorvastatin, 40 mg, Nightly  DULoxetine, 30 mg, BID  empagliflozin, 10 mg, Daily  ezetimibe, 10 mg, Daily  insulin lispro, 0-5 Units, TID with meals  polyethylene glycol, 17 g, Daily  potassium chloride CR, 20 mEq, Once  QUEtiapine, 200 mg, Nightly  rivaroxaban, 20 mg, Daily with evening meal  rOPINIRole, 2 mg, Daily  [Held by provider] sacubitriL-valsartan, 1 tablet, BID  spironolactone, 25 mg, Daily      PRN:  acetaminophen, 975 mg, q6h PRN   Or  acetaminophen, 650 mg, q6h PRN   Or  acetaminophen, 650 mg, q6h PRN  ALPRAZolam, 2 mg, q8h PRN  dextrose 10 % in water (D10W), 0.3 g/kg/hr, Once PRN  dextrose, 25 g, q15 min PRN  diphenhydrAMINE, 25 mg, q6h PRN  glucagon, 1 mg, q15 min PRN  hydrOXYzine HCL, 25 mg, q6h PRN  ondansetron, 4 mg, q6h PRN  oxyCODONE, 5 mg, q4h PRN  oxygen, , Continuous PRN - O2/gases  traZODone, 150 mg, Nightly PRN      Invasive Hemodynamics:    Most Recent Range Past 24hrs   BP (Art) 113/74 Arterial Line BP 1  Min: 94/62  Max: 121/59   MAP(Art) 86 mmHg Arterial Line MAP 1 (mmHg)   Min: 72 mmHg  Max: 90 mmHg   RA/CVP 15 mmHg No data recorded   PA 63/28 No data recorded  "  PA(mean) 40 mmHg No data recorded   PCWP 22 mmHg No data recorded   CO 3.6 L/min No data recorded   CI 1.47 L/min/m2 No data recorded   Mixed Venous 73 % No data recorded   SVR  1155 (dyne*sec)/cm5 No data recorded    (dyne*sec)/cm5 No data recorded     PHYSICAL EXAM:   Visit Vitals  /58   Pulse (!) 113   Temp 36.9 °C (98.4 °F) (Temporal)   Resp 19   Ht 1.727 m (5' 7.99\")   Wt 138 kg (305 lb 5.4 oz)   SpO2 96%   BMI 46.44 kg/m²   Smoking Status Every Day   BSA 2.58 m²     Wt Readings from Last 5 Encounters:   10/17/23 138 kg (305 lb 5.4 oz)   10/10/23 139 kg (307 lb)   12/08/22 133 kg (293 lb)   11/10/22 133 kg (292 lb 12.8 oz)   04/19/22 120 kg (264 lb 2 oz)     INTAKE/OUTPUT:  I/O last 3 completed shifts:  In: 998.8 (7.2 mL/kg) [I.V.:998.8 (7.2 mL/kg)]  Out: 1375 (9.9 mL/kg) [Urine:1375 (0.3 mL/kg/hr)]  Weight: 138.5 kg      Physical Exam  Constitutional:       General: He is not in acute distress.     Appearance: He is obese. He is ill-appearing.   Neck:      Vascular: No hepatojugular reflux or JVD.   Cardiovascular:      Rate and Rhythm: Normal rate and regular rhythm.      Pulses: Normal pulses.      Heart sounds: S1 normal and S2 normal.   Pulmonary:      Effort: Pulmonary effort is normal.      Breath sounds: Normal breath sounds. No decreased breath sounds, wheezing, rhonchi or rales.   Abdominal:      General: Bowel sounds are normal. There is distension.      Palpations: Abdomen is soft. There is no hepatomegaly or splenomegaly.      Tenderness: There is no abdominal tenderness.   Genitourinary:     Comments: Voiding freely   Musculoskeletal:      Cervical back: Normal range of motion.      Right lower leg: No edema.      Left lower leg: No edema.   Neurological:      General: No focal deficit present.      Mental Status: He is alert and oriented to person, place, and time. Mental status is at baseline.       DATA:  CMP:  Recent Labs     09/27/23  0413 10/10/23  1910 10/10/23  1910 " 10/11/23  0629 10/12/23  0149 10/12/23  1118 10/13/23  0356 10/14/23  0248 10/14/23  1341 10/15/23  0347 10/16/23  0428 10/17/23  0426 10/18/23  0420    140   < > 139 137 140 139 137 134* 136 134* 134* 133*   K 4.4 3.8   < > 3.4* 3.7 4.2 3.9 3.9 4.5 4.6 4.3 4.5 4.7    100   < > 100 99 100 99 95* 94* 98 94* 95* 96*   CO2 28 29   < > 28 29 29 30 28 29 28 29 31 27   ANIONGAP 11 15   < > 14 13 15 14 18 16 15 15 13 15   BUN 15 16   < > 18 17 19 18 20 21 20 19 20 22   CREATININE 0.83 1.01   < > 0.91 1.01 1.30 1.14 1.46* 1.22 1.13 1.04 1.15 1.47*   EGFR  --  >90   < > >90 >90 74 87 64 80 87 >90 86 64   MG 2.10 2.08  --  1.92 2.10  --  2.09 1.97  --  2.46* 2.19 2.32 2.38    < > = values in this interval not displayed.     Recent Labs     02/18/21  2245 02/19/21  1725 09/09/21  1334 09/10/21  2045 10/02/21  1738 10/03/21  0625 10/10/21  0354 10/12/21  0018 10/14/21  0834 11/03/21  2241 11/05/21  0907 07/15/23  1814 07/17/23  1543 07/19/23  2308 07/31/23  1731 08/12/23  1602 08/29/23  1846 08/30/23  0337 09/23/23  1815 09/24/23  0448 09/24/23  1430 10/10/23  1910 10/11/23  0629 10/12/23  1118 10/13/23  0356 10/14/23  0248 10/14/23  1341 10/15/23  0347 10/16/23  0428 10/17/23  0426 10/18/23  0420   ALBUMIN 4.3   < > 4.7   < >  --    < > 4.3 4.4   < > 4.5   < > 4.4 4.4 4.6 4.7 4.8 4.5   < > 4.3 4.4   < > 4.6   < > 4.2 4.3 4.6 4.4 4.2 4.7 4.6 4.5   ALT 54*   < > 25   < >  --    < > 20 56*   < > 19   < > 55* 50 44* 33 86* 54*  --  86* 87*  --  50  --   --   --   --   --   --   --   --   --    AST 40*   < > 25   < >  --    < > 19 45*   < > 18   < > 39 26 27 20 42* 29  --  49* 34  --  28  --   --   --   --   --   --   --   --   --    BILITOT 1.0   < > 0.6   < >  --    < > 1.3* 1.0   < > 2.3*   < > 0.4 0.5 0.4 0.5 0.7 0.6  --  0.4 0.3  --  0.5  --   --   --   --   --   --   --   --   --    LIPASE 40  --  90*  --  105*  --  23 54  --  23  --  42  --   --  43  --   --   --   --   --   --   --   --   --   --   --   --   --    --   --   --     < > = values in this interval not displayed.     CBC:  Recent Labs     10/11/23  0629 10/12/23  0149 10/13/23  0356 10/14/23  0248 10/15/23  0347 10/16/23  0428 10/17/23  0426 10/18/23  0420   WBC 5.1 5.1 6.6 8.5 5.1 5.9 6.1 6.4   HGB 11.8* 11.3* 12.9* 13.4* 12.0* 12.8* 12.9* 13.0*   HCT 37.6* 34.9* 41.1 41.4 37.3* 41.0 39.8* 41.9    266 304 316 242 275 271 314   MCV 80 77* 82 78* 78* 81 77* 80     COAG:   Recent Labs     01/01/23  0613 07/12/23  1701 07/17/23  1543 08/29/23  1846 09/23/23  1815 09/24/23  0448 10/10/23  1910 10/12/23  1732   INR 1.1 1.2* 1.1 1.1 1.0 1.0 1.1 1.0     ABO:   Recent Labs     10/12/23  1732   ABO A     HEME/ENDO:  Recent Labs     06/13/23  0537 08/29/23  1846 10/10/23  1910   FERRITIN  --  239 189   IRONSAT  --  22* 23*   TSH  --  2.05 2.46   HGBA1C 8.5* 6.2*  --       CARDIAC:   Recent Labs     03/04/21  1835 03/08/21  1001 05/26/21  1440 06/03/21  1243 06/19/21  0657 06/27/21  0328 08/03/21  1336 08/05/21  0947 08/16/21  1756 07/07/23  1546 07/12/23  1701 07/12/23  1739 07/15/23  1814 07/15/23  1920 07/17/23  1543 07/17/23  1640 08/29/23  1846 08/30/23  0339 09/05/23  1557 09/23/23  1815 09/23/23  1902 09/23/23  2056 09/25/23  1054 10/10/23  1910 10/12/23  1732 10/13/23  0356 10/13/23  1259    116  --  331*  --  150  --  168  --   --   --   --   --   --   --   --   --   --   --   --   --   --   --   --  153  --   --    TROPHS  --   --   --   --   --   --   --   --    < > 17  --    < > 11   < > 8   < > CANCELED 14 10 38 40 47  --  30  --  25 18   BNP  --   --    < >  --    < >  --    < >  --    < > 768* 161*  --  400*  --  405*  --  204*  --   --  119*  --   --  CANCELED 176*  --   --   --     < > = values in this interval not displayed.     Recent Labs     09/03/23  0501 09/03/23  1058 09/04/23  2225 09/05/23  0534 09/25/23  1836 10/11/23  1519 10/11/23  1830 10/13/23  1809 10/13/23  2334 10/14/23  0551 10/14/23  1112 10/14/23  1348   LACMX 0.8  --  1.1   --  0.6 1.2  --   --   --   --   --  2.0   SO2MV 72   < > 69   < > 63 46   < > 57 59 73 65 49   O2CMX 70.8   < > 67.5   < > 61.9 45.5   < > 55.6 57.1 71.2 63.8 48.4    < > = values in this interval not displayed.     Chest X-ray ( 10/12)  1. Trace bibasilar pleural effusions with atelectasis.  2. Mild perihilar congestion/edema.  3. Medical devices as described above.  CT of Chest (10/12)     1. Mild enlargement of the left ventricle.  2. The sternum lies within 5 mm of the right ventricle.  3. Trace bilateral pleural effusions with atelectasis.  4. Hepatic steatosis.    Vascular US Ankle Brachial Index without exercise (10/12)  Bilateral Lower PVR: No evidence of arterial occlusive disease bilaterally in the lower extremities at rest.  Right Lower PVR: Biphasic flow is noted in the right common femoral artery, right posterior tibial artery and right dorsalis pedis artery.  Left Lower PVR: Biphasic flow is noted in the left common femoral artery, left posterior tibial artery and left dorsalis pedis artery.     Prior to Admission Meds:  Medications Prior to Admission   Medication Sig Dispense Refill Last Dose    acetaminophen (Tylenol) 325 mg tablet Take 2 tablets (650 mg) by mouth every 4 hours if needed for mild pain (1 - 3).   Unknown    ALPRAZolam (Xanax) 2 mg tablet Take 1 tablet (2 mg) by mouth 3 times a day.   10/10/2023    blood sugar diagnostic strip USE TO TEST BEFORE MEALS AND AT BEDTIME (Patient not taking: Reported on 10/11/2023) 100 each 1 Unknown    cholecalciferol (Vitamin D-3) 25 MCG (1000 UT) capsule Take 1 capsule (25 mcg) by mouth once daily in the morning.   10/10/2023    cyanocobalamin (Vitamin B-12) 1,000 mcg tablet Take 100 mcg by mouth once daily.   10/10/2023    diphenhydrAMINE (Benadryl Allergy) 25 mg tablet Take 1 tablet (25 mg) by mouth as needed at bedtime for allergies.   Unknown    DULoxetine (Cymbalta) 30 mg DR capsule Take 1 capsule (30 mg) by mouth 2 times a day.   10/10/2023     empagliflozin (Jardiance) 10 mg Take 1 tablet (10 mg) by mouth once daily.   10/10/2023    ezetimibe (Zetia) 10 mg tablet Take 1 tablet (10 mg) by mouth once daily.   Unknown    gabapentin (Neurontin) 100 mg capsule TAKE 2 CAPSULES BY MOUTH THREE TIMES A DAY. 180 capsule 3 10/10/2023    hydrOXYzine HCL (Atarax) 50 mg tablet Take 2 tablets (100 mg) by mouth once daily at bedtime.   10/9/2023 at evening    insulin lispro (HumaLOG) 100 unit/mL injection INJECT THREE TIMES A DAY VIA ATTACHED SLIDING SCALE (Patient not taking: Reported on 10/11/2023) 15 mL 1 Not Taking    magnesium oxide (Mag-Ox) 400 mg (241.3 mg magnesium) tablet Take 1 tablet (400 mg) by mouth once daily.   10/10/2023    milrinone in 5 % dextrose (Primacor) 40 mg/200 mL (200 mcg/mL) infusion 0.25 mcg/kg/min intravenous continuously   10/10/2023    polyethylene glycol (Glycolax, Miralax) 17 gram/dose powder Take 17 g by mouth once daily.   10/10/2023    QUEtiapine (SEROquel) 200 mg tablet Take 1 tablet (200 mg) by mouth once daily at bedtime.   10/9/2023 at evening    rivaroxaban (Xarelto) 20 mg tablet 1 tab(s) orally  - Daily 1800   10/10/2023    rOPINIRole (Requip) 2 mg tablet Take 1 tablet (2 mg) by mouth once daily at bedtime.   10/9/2023 at evening    sennosides-docusate sodium (Keyonna-Colace) 8.6-50 mg tablet Take 2 tablets by mouth 2 times a day.   10/10/2023    spironolactone (Aldactone) 25 mg tablet Take 1 tablet (25 mg) by mouth once daily.   10/10/2023    torsemide (Demadex) 100 mg tablet Take 1 tablet (100 mg) by mouth once daily.   10/10/2023    traZODone (Desyrel) 150 mg tablet Take 1 tablet (150 mg) by mouth once daily at bedtime.   10/9/2023 at evening       ASSESSMENT AND PLAN:    Stephan Gallo is a 34 y/o M with PMHx significant for stage D, NYHA class IV HFrEF (10%) with RV dysfunction s/p ICD (6/2020) now with subcutaneous ICD 2/2 infection, nonischemic cardiomyopathy (2018 2/2 hydrocarbon intoxication), moderate to severe TR, COPD,  DAVONTE (not on CPAP), DM2, Anxiety, Depression, PTSD, ADHD, nicotine abuse (current smoker), ETOH abuse, MSSA, GERD, LV thrombus, and PE (on xarelto) who presented to his outpatient clinic visit with Dr. Reeves where he admitted to dyspnea on exertion and significant chest pressure. He was admitted to HFICU 10/10 for further management of Acute Decompensated Heart Failure and potential LVAD therapy work-up. [place] catheter inserted and with numbers consistent with low output state on his home milrinone dose of 0.25. Milrinone increased to 0.375 mcg/kg/min and advanced therapies email sent 10/12. Milrinone decreased to 0.25 on 10/14 d/t hypotension, but was increased back to 0.375 10/15. Patient was presented to advanced therapy selection committee 10/17 and formally declined for LVAD due to psychosocial concerns including uncontrolled pain, h/o substance use, SI/HI with many hospitalizations. He will be offered to seek second opinion at [facility]. Otherwise plan to optimize medically and discharge home with inotropes and palliative care.    Neuro:  #ADHD  #Depression with history of suicidal ideations   #Chronic pain   #Restless Leg Syndrome   -C/w home ropinirole 2 mg at bedtime   -C/w home quetiapine fumarate 200 mg at bedtime   -C/w home cymbalta 30 mg BID   -C/w home xanax 2 mg TID PRN for anxiety  -C/w home trazodone 150 mg at night  -Hydroxyzine 25 mg every 6 hours PRN  -Tylenol for pain PRN  -Chronic pain consult, ok to give PO oxycodone 5 mg q4 PRN for short term use only  -Will need chronic pain management to establish long term care plan after discharge   -Serial neuro and pain assessments   -PT/OT Consult, OOB to chair  -CAM ICU score every shift  -Sleep/wake cycle normalization     #Physical Status  -Morbid Obesity- BMI 46     #Substance abuse  History of ETOH abuse (quit in 2020)  Tobacco use/Nicotine Dependence (smokes 1-2 cigarettes daily)  Former cocaine user (quit in his 20s)  Hydrocarbon inhalant  abuse (2020)  -Tox screen (10/10) positive for benzodiazepines, but patient is prescribed xanax   -Nicotine screen <15    Cardiovascular:  #NYHA Class IV, Stage D systolic heart failure NICM/HFrEF (15-20%)  #S/p ICD (2020) c/b infection/ endocarditis replaced with subcutaneous ICD (2022)  - Echo (8/30/23): LVEF 15-20%, LV severely dilated. Global hypokinesis of the LV. Moderately reduced RV function. LA mild-moderately dilated. RA is moderately to severely dilated. Moderately elevated PAP.  Closing SGC # (10/14): BP 94/53 (67) CVP 21, PAP 69/42 (53), CO/CI 5.08/2.07, , SvO2 65% on milrinone 0.375, levophed 0.7, empagliflozin 10 mg and spironolactone 25 mg   - Admit weight 139 kg   - Daily weight (10/15): 153 kg  - Admit   - C/w milrinone 0.375 mcg/kg/min  - Discontinue entresto 24/26 mg BID  - Discontinue Hydralazine 25 mg Q8 and isordil 10 mg TID   -C/w empagliflozin 10 mg daily   -C/w home spironolactone 25 mg daily   -Holding home metoprolol succinate 25 mg daily  - Spot diurese as needed.   -Daily standing weights, regular diet, 2L fluid restriction, strict I&Os    #Advanced Therapies Evaluation  -Completed workup, presented at selection committee today 10/17. Formally declined for LVAD. Will offer referral to second opinion at [facility].   -Discussed with him and his mother at bedside about hospice care. Will let them think about his goals of care for now.  -Palliative care following, appreciate assistance     # Chronic Chest Pain with multiple ED visits (30-40 visits)  # Pain management consulted and following   - EKG (10/10/23) sinus tachycardia   -Troponin (10/10) 30  - State EKG and troponin lab with negative results (10/13)    #Nonobstructive CAD   [facility] (2020) at [place] negative     #HLD  Lipid panel (8/29/23): cholesterol 238, HDL 32, , VLDL 75,   -C/w home ezetimibe 10 mg daily   -C/w home atorvastatin 40 mg at night      #No history of Arrhythmias  -Initially, ICD placed in 2020.  Removed due to infection.  -Subcutaneous ICD in place for primary prevention      #Electrolyte Disturbances  -Keep K>4 and Mag>2     Pulmonary:   #Hx of PE (on Xarelto at home)  #DAVONTE (not on CPAP)  CTA (9/22/23) negative for PE  -Continue home xarelto   -Monitor and maintain SpO2 > 92%  -CPAP at bedtime     GI:  #Nausea  -Bowel regimen   -Zofran PRN      :  #TONYA, pre renal iso poor cardiac output  -Baseline BUN/Cr ~15/0.9  -I/Os  -Avoid hypotension and nephrotoxic agents     #Hx of Epididymal Cystic lesion  US Scrotum w Doppler (3/28/23) A large complex cystic lesion in the left epididymal head is slightly smaller compared to prior imaging with a new echogenic component without internal vascularity. Recommend follow-up as clinically indicated  -Continue to monitor      Heme:  #No active issues   Iron studies (10/10): iron 96, TIBC 409, %sat 23, folate 11.8, ferritin 189, vitamin B12 367     Endo:  #No history of DM2 or thyroid disorder  HgbA1c (8/29) 6.2  -SSI  TSH (10/10): 2.46       ID:  -Afebrile, nontoxic, no s/s infx  -Trend temps q4h     Feeding/fluids: Regular  Thromboprophylaxis: SCDs, xarelto  VAP bundle: n/a  Ulcer prophylaxis: n/a  Glycemic control: Sliding scale   Bowel care: Colace & miralax PRN  Indwelling catheter: None     PT/OT: following      Code Status: Full Code  Access:  PIVs   Enrrique catheter(9/7)  [facility] [place] [facility] [place] (10/11-10/14)     Family Primary Contact/Next of Kin: Cathy Gallo (mother) [phone]     A. -G. reviewed      Dispo: Remain in HFICU while optimizing PO meds, dependent on IV milrinone.     Patient seen and assessed with Dr. Cordero.  _________________________________________________  Toño Pandya, DO PGY-4  Heart Failure Fellow

## 2023-10-18 NOTE — CONSULTS
" Inpatient consult to Palliative Care  Consult performed by: SATHYA Frances-CNP  Consult ordered by: Renaldo Dunlap PA-C        Reason For Consult: communication/complex medical decision making, symptom management, and patient/family support    History Of Present Illness  Stephan Gallo is a 33 y.o. male with a pmh of HFrEF (10%) with RV dysfunction, NICM (2/2 hydrocarbon intoxication 2018), moderate to severe TR, COPD, DAVONTE, T2DM, anxiety, depression, PTSD, ADHD, remote etoh abuse, remote nicotine abuse, MSSA, GERD, and PE on xarelto. He presented to outpatient clinic with chest pressure and dyspnea on exertion. He was admitted to HFICU for acute decompensated HF. Pt is not a candidate for advanced therapies. Of note, he is well known to our service from previous admissions for similar concerns and was discharged home on milrinone.     Symptoms  Pain: Patient has ongoing chest pressure that is minimally relieved with oxycodone  Dyspnea: Significant  Fatigue: Significant  Insomnia: Intermittent  Drowsiness: Significant, feels very drowsy today  Nausea: Denies    Serious illness conversation:  Information: Prefers full disclosure of all health related details  Understanding: Has a fair understanding of current health situation, but is having difficulty coping with the situation  Prognosis: Prefers full disclosure but is having difficulty coping with prognosis  Goals: Patient wants to seek second opinion at [facility] for advanced therapies, although he does acknowledge that it may be highly unlikely he will qualify for LVAD.  Minimal acceptable outcome: [place], cognition intact  Maximal burden: I discussed CODE STATUS with patient.  I explained to him his overall poor prognosis and tried to engage with him about what he would want his death to look like.  We explained what a peaceful death with hospice could look like versus what a \"CODE BLUE\" would look like in the event that he did die while being " in the hospital.  He reported that this was hard for him to process, and he is unsure if he wants to change his CODE STATUS at this time.  We did discuss that medications such as milrinone could not be continued while receiving hospice care, he was surprised to hear this, despite our previous conversations on this topic in previous admissions.  I explained to him that his life would likely be very short in the event he enrolled in hospice without inotropes  Family: Mother is very active in his care and is reportedly having a very difficult time processing the news that he is not a candidate for advanced therapies at this time at .    Advance Care Planning   Surrogate decision maker: Cathy Gallo  Advance directives: Healthcare power of  and living will on file     Social History  He reports that he has been smoking cigarettes. He has been smoking an average of .5 packs per day. He does not have any smokeless tobacco history on file. He reports that he does not currently use alcohol. He reports that he does not currently use drugs after having used the following drugs: Solvent inhalants and Cocaine.     Allergies  Adhesive, Adhesive tape-silicones, Bee venom protein (honey bee), Insect venom, and Wasp venom    Physical Exam  Physical Exam  Constitutional:       Appearance: He is obese. He is ill-appearing.   HENT:      Head: Normocephalic.      Mouth/Throat:      Mouth: Mucous membranes are dry.   Eyes:      Pupils: Pupils are equal, round, and reactive to light.   Cardiovascular:      Rate and Rhythm: Tachycardia present.   Pulmonary:      Comments: tachypnea  Abdominal:      Comments: rounded   Musculoskeletal:      Comments: weakness   Skin:     General: Skin is warm and dry.   Neurological:      General: No focal deficit present.      Mental Status: He is oriented to person, place, and time.   Psychiatric:         Mood and Affect: Mood normal.         Behavior: Behavior normal.      Comments: Calm,  "appropriate         Vitals  Vital Signs  Temp: 35.3 °C (95.5 °F)  Temp Source: Temporal  Heart Rate: 97  Heart Rate Source: Monitor  Resp: (!) 9  BP: (!) 94/49  BP Location: Right arm  BP Method: Automatic  Patient Position: Lying    Height and Weight  Height and Weight  Height: 172.7 cm (5' 7.99\")  Weight: 138 kg (305 lb 5.4 oz)  BSA (Calculated - sq m): 2.58 sq meters  BMI (Calculated): 46.44  Weight in (lb) to have BMI = 25: 164      Relevant Results  [unfilled]    Results for orders placed or performed during the hospital encounter of 10/10/23 (from the past 24 hour(s))   POCT GLUCOSE   Result Value Ref Range    POCT Glucose 223 (H) 74 - 99 mg/dL   POCT GLUCOSE   Result Value Ref Range    POCT Glucose 141 (H) 74 - 99 mg/dL   CBC   Result Value Ref Range    WBC 6.4 4.4 - 11.3 x10*3/uL    nRBC 0.0 0.0 - 0.0 /100 WBCs    RBC 5.23 4.50 - 5.90 x10*6/uL    Hemoglobin 13.0 (L) 13.5 - 17.5 g/dL    Hematocrit 41.9 41.0 - 52.0 %    MCV 80 80 - 100 fL    MCH 24.9 (L) 26.0 - 34.0 pg    MCHC 31.0 (L) 32.0 - 36.0 g/dL    RDW 15.2 (H) 11.5 - 14.5 %    Platelets 314 150 - 450 x10*3/uL    MPV 9.1 7.5 - 11.5 fL   Renal function panel   Result Value Ref Range    Glucose 221 (H) 74 - 99 mg/dL    Sodium 133 (L) 136 - 145 mmol/L    Potassium 4.7 3.5 - 5.3 mmol/L    Chloride 96 (L) 98 - 107 mmol/L    Bicarbonate 27 21 - 32 mmol/L    Anion Gap 15 10 - 20 mmol/L    Urea Nitrogen 22 6 - 23 mg/dL    Creatinine 1.47 (H) 0.50 - 1.30 mg/dL    eGFR 64 >60 mL/min/1.73m*2    Calcium 9.3 8.6 - 10.6 mg/dL    Phosphorus 4.0 2.5 - 4.9 mg/dL    Albumin 4.5 3.4 - 5.0 g/dL   Magnesium   Result Value Ref Range    Magnesium 2.38 1.60 - 2.40 mg/dL   BLOOD GAS VENOUS FULL PANEL   Result Value Ref Range    POCT pH, Venous 7.33 7.33 - 7.43 pH    POCT pCO2, Venous 54 (H) 41 - 51 mm Hg    POCT pO2, Venous 50 (H) 35 - 45 mm Hg    POCT SO2, Venous 73 45 - 75 %    POCT Oxy Hemoglobin, Venous 71.3 45.0 - 75.0 %    POCT Hematocrit Calculated, Venous 41.0 " 41.0 - 52.0 %    POCT Sodium, Venous 128 (L) 136 - 145 mmol/L    POCT Potassium, Venous 4.8 3.5 - 5.3 mmol/L    POCT Chloride, Venous 97 (L) 98 - 107 mmol/L    POCT Ionized Calicum, Venous 1.17 1.10 - 1.33 mmol/L    POCT Glucose, Venous 251 (H) 74 - 99 mg/dL    POCT Lactate, Venous 0.8 0.4 - 2.0 mmol/L    POCT Base Excess, Venous 1.4 -2.0 - 3.0 mmol/L    POCT HCO3 Calculated, Venous 28.5 (H) 22.0 - 26.0 mmol/L    POCT Hemoglobin, Venous 13.8 13.5 - 17.5 g/dL    POCT Anion Gap, Venous 7.0 (L) 10.0 - 25.0 mmol/L    Patient Temperature 37.0 degrees Celsius    FiO2 30 %   POCT GLUCOSE   Result Value Ref Range    POCT Glucose 246 (H) 74 - 99 mg/dL   POCT GLUCOSE   Result Value Ref Range    POCT Glucose 264 (H) 74 - 99 mg/dL        No results found for this or any previous visit (from the past 4464 hour(s)).     Assessment/Plan     Medical Problems       Problem List       * (Principal) Acute decompensated heart failure (CMS/[facility])    ADHD (attention deficit hyperactivity disorder)    Anemia, iron deficiency    Antisocial personality disorder (CMS/[facility]) (Chronic)    Back pain, chronic    Affective psychosis, bipolar (CMS/[facility])    Major depressive disorder    Overview Signed 10/10/2023  2:11 PM by Akhil Higuera     Last Assessment & Plan: Formatting of this note might be different from the original. Condition: stable No recent mental health visit. Advised to follow up Medications: Member declines medications If taking medications, do not stop treatment without consulting healthcare provider. If symptoms worsen or do not improve/stabilize, notify health care provider right away. If thoughts of harming self or others notify health care provider immediately &/or seek urgent/emergent care including calling Suicide Hotline (1-530.182.1480) or 911. Follow up in one month with Psychologist/Counselor/SupportGroup/Psychiatrist             Cardiogenic shock (CMS/[facility]) posing threat to life and function    Overview Signed 10/10/2023   2:11 PM by Akhil Higuera     Last Assessment & Plan: Formatting of this note might be different from the original.  The patient has combination of dilated cardiomyopathy with decreased left ventricular function.  Also the patient has severe diastolic dysfunction.   The patient has not been able to drink or eat and may be hypovolemic at this time.This most probably is related to the patient has hereditary hemochromatosis.  Will judiciously administer fluid to the patient.  Will maintain mean arterial pressure of 65 millimeter of mercury.  At this time will avoid use of norepinephrine and specially vasopressin as the patient liver is very sensitive to reduced blood flow and may result in worsening of his liver function.  Will continue fluid resuscitation         Cardiomegaly    Overview Signed 10/10/2023  2:11 PM by Akhil Higuera     Last Assessment & Plan: Formatting of this note might be different from the original. 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 Will continue with home medication of Diovan, Aldactone, Bumex, and metoprolol         Chronic systolic heart failure (CMS/HCC) posing threat to life and function    Controlled type 2 diabetes mellitus without complication, without long-term current use of insulin (CMS/HCC) (Chronic)    Overview Signed 10/10/2023  2:11 PM by Akhil Higuera     Formatting of this note might be different from the original. -continue Jardiance (started this admission) -continue SSI; hypoglycemia protocol         COPD (chronic obstructive pulmonary disease) (CMS/HCC)    Dependence on supplemental  oxygen    Overview Signed 10/10/2023  2:11 PM by Akhil Higuera     Formatting of this note might be different from the original. Z99.81 - Pulmonary - very high Added by Interface         GERD (gastroesophageal reflux disease) (Chronic)    Overview Signed 10/10/2023  2:11 PM by Akhil Higuera     Last Assessment & Plan: Formatting of this note might be different from the original. Condition: stable Reviewed use of antacid medication and/or diet modifications of decreasing caffeine, spicy foods, chocolate, and avoiding alcohol, tobacco, NSAIDs, and reducing citrus acids. Follow up in: six months         Essential hypertension (Chronic)    Overview Signed 10/10/2023  2:11 PM by Akhil Higuera     Last Assessment & Plan: Formatting of this note might be different from the original. Will continue with valsartan and metoprolol         Generalized anxiety disorder (Chronic)    Hyperlipidemia    ICD (implantable cardioverter-defibrillator) in place    Overview Signed 10/10/2023  2:11 PM by Akhil Reyes Assessment & Plan: Formatting of this note might be different from the original. Condition: stable Follow up in: one year         Impulse control disorder (Chronic)    Overview Signed 10/10/2023  2:11 PM by Akhil Reyes Assessment & Plan: Formatting of this note might be different from the original. Condition: stable Follow up in: one month         Inguinal hernia    Insomnia    Obesity, Class III, BMI 40-49.9 (morbid obesity) (CMS/HCC)    Obstructive sleep apnea syndrome (Chronic)    Post-traumatic stress disorder, chronic    Recurrent major depressive disorder, in partial remission (CMS/HCC) (Chronic)    Overview Signed 10/10/2023  2:11 PM by Akhil Higuera     Formatting of this note might be different from the original. -continue Lexapro; mood stable without SI/HI Formatting of this note might be different from the original. Formatting of this note might be different from the original.  -continue Lexapro; mood stable without SI/HI Last Assessment & Plan: Formatting of this note might be different from the original. Condition: stable No recent mental health visit. Advised to follow up Medications: Member declines medications If taking medications, do not stop treatment without consulting healthcare provider. If symptoms worsen or do not improve/stabilize, notify health care provider right away. If thoughts of harming self or others notify health care provider immediately &/or seek urgent/emergent care including calling Suicide Hotline ([phone]) or 911. Follow up in one month with Psychologist/Counselor/SupportGroup/Psychiatrist                     Sinus tachycardia          #complex medical decision making   #goals of care  #advance care planning   -code status: Full, recommend addressing in subsequent visits  -surrogate decision maker: Mother Cathy  -Goal: To get second opinion at [facility].  Patient has to go home on inotropes and be readmitted at [facility], unable to do hospital to hospital transfer at this time per primary team fellow  -We will continue to follow as needed for ongoing goals of care  -Outpatient palliative care referral at discharge (Navigator program through [facility])--note, referral had been placed before but patient was not home long enough to establish care    #Coping  - Music therapy  - Chaplaincy  - Social work support  -Recommend further goals of care conversations with patient's mother    I spent 75 minutes in the care of this patient.  I spent an additional 1 hour in ACP    Medical Decision Making was high level due to high complexity of problems, extensive data review, and high risk of management/treatment.      Thank you for allowing us to care for this patient. Palliative will continue to follow as needed.   Palliative medicine is available Monday-Friday, 8a-6p. Please contact team with any questions or concerns.  Team pager [phone]  Lauren Perez DNP, CNP

## 2023-10-19 NOTE — HOSPITAL COURSE
Stephan Gallo is a 32 y/o M with PMHx significant for stage D, NYHA class IV HFrEF (10%) with RV dysfunction s/p ICD (6/2020) now with subcutaneous ICD 2/2 infection, nonischemic cardiomyopathy (2018 2/2 hydrocarbon intoxication), moderate to severe TR, COPD, DAVONTE (not on CPAP), DM2, Anxiety, Depression, PTSD, ADHD, nicotine abuse (current smoker), ETOH abuse, MSSA, GERD, LV thrombus, and PE (on xarelto) who presented to his outpatient clinic visit with Dr. Reeves where he admitted to dyspnea on exertion and significant chest pressure. He was admitted to HFICU 10/10 for further management of Acute Decompensated Heart Failure and LVAD therapy work-up. [place]- [place] catheter inserted and with numbers consistent with low output state (10/11 opening numbers RA 8, PA 39/22 (27), W 17, , SVO2 72%, CO/CI 5.4/2.2 on his home milrinone dose of 0.25. Milrinone increased to 0.375 mcg/kg/min and advanced therapies email sent 10/12. Milrinone decreased to 0.25 on 10/14 d/t hypotension, but was increased back to 0.375 10/15. Closing SGC # (10/14): BP 94/53 (67) CVP 21, PAP 69/42 (53), CO/CI 5.08/2.07, , SvO2 65% on milrinone 0.375, levophed 0.7, empagliflozin 10 mg and spironolactone 25 mg. Patient was resumed on torsemide at 100mg daily with robust urine output (2+Liters out). Patient was presented to advanced therapy selection committee 10/17 and formally declined for LVAD due to psychosocial concerns including uncontrolled pain, h/o substance use, SI/HI with many hospitalizations. He will be offered to seek second opinion at [facility]. Otherwise plan to optimize medically and discharge home with inotropes and palliative care.     Floor Course:     New milrinone dose submitted to home care and insurance for approval. Hookup arranged for 10/21/23.     Discharge weight: 134 kg    After all labs and VS were reviewed the decision was made that the patient was medically stable for discharge.  The patient was discharged in  satisfactory condition.    More than 30 minutes were spent in coordinating patient discharge.

## 2023-10-19 NOTE — PROGRESS NOTES
SOCIAL WORK NOTE   Ongoing GOC discussion. Patient is currently pending floor. May go home with inotrope and be considered for reevaluation at [facility] for second opinion of transplant. He is not able to do a direct hospital to hospital transfer at this time. If not a candidate, patient would go home with hospice. Social work to follow.  MERLY Angela, LISW-S ([id number])

## 2023-10-19 NOTE — PROCEDURES
Spirometry, Lung volumes, DLCO testing completed.  Room air ABG completed.  Results placed in paper chart and faxed to [facility]/transplant.

## 2023-10-19 NOTE — CARE PLAN
The patient's goals for the shift include maintaining a safe transfer to LT5.     The clinical goals for the shift include to remain hemodynamically stable.      Problem: Pain  Goal: My pain/discomfort is manageable  Outcome: Not Progressing     Problem: Daily Care  Goal: Daily care needs are met  Outcome: Not Progressing     Problem: Psychosocial Needs  Goal: Demonstrates ability to cope with hospitalization/illness  Outcome: Not Progressing  Goal: Collaborate with me, my family, and caregiver to identify my specific goals  Outcome: Not Progressing     Problem: Discharge Barriers  Goal: My discharge needs are met  Outcome: Not Progressing     Problem: Pain  Goal: Takes deep breaths with improved pain control throughout the shift  Outcome: Not Progressing  Goal: Turns in bed with improved pain control throughout the shift  Outcome: Not Progressing  Goal: Walks with improved pain control throughout the shift  Outcome: Not Progressing  Goal: Performs ADL's with improved pain control throughout shift  Outcome: Not Progressing  Goal: Participates in PT with improved pain control throughout the shift  Outcome: Not Progressing  Goal: Free from opioid side effects throughout the shift  Outcome: Not Progressing  Goal: Free from acute confusion related to pain meds throughout the shift  Outcome: Not Progressing     Problem: Pain - Adult  Goal: Verbalizes/displays adequate comfort level or baseline comfort level  Outcome: Not Progressing     Problem: Safety - Adult  Goal: Free from fall injury  Outcome: Not Progressing     Problem: Discharge Planning  Goal: Discharge to home or other facility with appropriate resources  Outcome: Not Progressing     Problem: Chronic Conditions and Co-morbidities  Goal: Patient's chronic conditions and co-morbidity symptoms are monitored and maintained or improved  Outcome: Not Progressing     Problem: Heart Failure  Goal: Report improvement of dyspnea/breathlessness this shift  Outcome: Not  Progressing  Goal: Weight from fluid excess reduced over 2-3 days, then stabilize  Outcome: Not Progressing  Goal: Increase self care and/or family involvement in 24 hours  Outcome: Not Progressing     Problem: Diabetes  Goal: Achieve decreasing blood glucose levels by end of shift  Outcome: Not Progressing  Goal: Increase stability of blood glucose readings by end of shift  Outcome: Not Progressing  Goal: Decrease in ketones present in urine by end of shift  Outcome: Not Progressing  Goal: Maintain electrolyte levels within acceptable range throughout shift  Outcome: Not Progressing  Goal: Maintain glucose levels >70mg/dl to <250mg/dl throughout shift  Outcome: Not Progressing  Goal: No changes in neurological exam by end of shift  Outcome: Not Progressing  Goal: Learn about and adhere to nutrition recommendations by end of shift  Outcome: Not Progressing  Goal: Vital signs within normal range for age by end of shift  Outcome: Not Progressing  Goal: Increase self care and/or family involovement by end of shift  Outcome: Not Progressing  Goal: Receive DSME education by end of shift  Outcome: Not Progressing     Problem: Fall/Injury  Goal: Not fall by end of shift  Outcome: Not Progressing  Goal: Be free from injury by end of the shift  Outcome: Not Progressing  Goal: Verbalize understanding of personal risk factors for fall in the hospital  Outcome: Not Progressing  Goal: Verbalize understanding of risk factor reduction measures to prevent injury from fall in the home  Outcome: Not Progressing  Goal: Use assistive devices by end of the shift  Outcome: Not Progressing  Goal: Pace activities to prevent fatigue by end of the shift  Outcome: Not Progressing

## 2023-10-19 NOTE — CARE PLAN
Patient transferred to the floor in stable condition to the [facility] Cardiology service. Upon evaluation, patient is HDS and in no acute distress with no c/o pain or discomfort. Based on chart review and physical examination, patient is appropriate for the HVI/HF floor service at this time. Transfer orders reviewed and updated. Plan of care as stated in transfer note.    To be seen and discussed with staff attending Dr. Coffey in the AM     Results for orders placed or performed during the hospital encounter of 10/10/23 (from the past 24 hour(s))   CBC   Result Value Ref Range    WBC 6.0 4.4 - 11.3 x10*3/uL    nRBC 0.0 0.0 - 0.0 /100 WBCs    RBC 5.23 4.50 - 5.90 x10*6/uL    Hemoglobin 13.1 (L) 13.5 - 17.5 g/dL    Hematocrit 40.1 (L) 41.0 - 52.0 %    MCV 77 (L) 80 - 100 fL    MCH 25.0 (L) 26.0 - 34.0 pg    MCHC 32.7 32.0 - 36.0 g/dL    RDW 15.1 (H) 11.5 - 14.5 %    Platelets 260 150 - 450 x10*3/uL    MPV 8.7 7.5 - 11.5 fL   Renal function panel   Result Value Ref Range    Glucose 214 (H) 74 - 99 mg/dL    Sodium 132 (L) 136 - 145 mmol/L    Potassium 4.3 3.5 - 5.3 mmol/L    Chloride 97 (L) 98 - 107 mmol/L    Bicarbonate 27 21 - 32 mmol/L    Anion Gap 12 10 - 20 mmol/L    Urea Nitrogen 23 6 - 23 mg/dL    Creatinine 0.97 0.50 - 1.30 mg/dL    eGFR >90 >60 mL/min/1.73m*2    Calcium 9.4 8.6 - 10.6 mg/dL    Phosphorus 3.9 2.5 - 4.9 mg/dL    Albumin 4.4 3.4 - 5.0 g/dL   Magnesium   Result Value Ref Range    Magnesium 2.45 (H) 1.60 - 2.40 mg/dL   POCT GLUCOSE   Result Value Ref Range    POCT Glucose 205 (H) 74 - 99 mg/dL   POCT GLUCOSE   Result Value Ref Range    POCT Glucose 169 (H) 74 - 99 mg/dL   POCT GLUCOSE   Result Value Ref Range    POCT Glucose 193 (H) 74 - 99 mg/dL      Visit Vitals  /83 (BP Location: Right arm, Patient Position: Lying)   Pulse (!) 130   Temp 36.1 °C (96.9 °F) (Temporal)   Resp 19

## 2023-10-19 NOTE — PROGRESS NOTES
[facility]  HFICU TRANSFER NOTE    Stephan Gallo/[id number]    Admit Date: 10/10/2023  Hospital Length of Stay: 9   ICU Length of Stay: 8d 18h   Primary Service:   Primary HF Cardiologist:   Referring:    Hospital Course:   Stephan Gallo is a 32 y/o M with PMHx significant for stage D, NYHA class IV HFrEF (10%) with RV dysfunction s/p ICD (6/2020) now with subcutaneous ICD 2/2 infection, nonischemic cardiomyopathy (2018 2/2 hydrocarbon intoxication), moderate to severe TR, COPD, DAVONTE (not on CPAP), DM2, Anxiety, Depression, PTSD, ADHD, nicotine abuse (current smoker), ETOH abuse, MSSA, GERD, LV thrombus, and PE (on xarelto) who presented to his outpatient clinic visit with Dr. Reeves where he admitted to dyspnea on exertion and significant chest pressure. He was admitted to HFICU 10/10 for further management of Acute Decompensated Heart Failure and potential LVAD therapy work-up. [place]- [place] catheter inserted and with numbers consistent with low output state on his home milrinone dose of 0.25. Milrinone increased to 0.375 mcg/kg/min and advanced therapies email sent 10/12. Milrinone decreased to 0.25 on 10/14 d/t hypotension, but was increased back to 0.375 10/15. Patient was presented to advanced therapy selection committee 10/17 and formally declined for LVAD due to psychosocial concerns including uncontrolled pain, h/o substance use, SI/HI with many hospitalizations. He will be offered to seek second opinion at [facility]. Otherwise plan to optimize medically and discharge home with inotropes and palliative care.    Items Pending:  - solidify goals of care discussion regarding Hospice   - discharge on Palliative inotrope    MEDICATIONS  Infusions:  lactated Ringer's, Last Rate: 10 mL/hr (10/18/23 1200)  milrinone, Last Rate: 0.375 mcg/kg/min (10/19/23 0800)      Scheduled:  docusate sodium, 100 mg, BID  DULoxetine, 30 mg, BID  [START ON 10/20/2023] empagliflozin, 25 mg,  "Daily  insulin lispro, 0-5 Units, TID with meals  polyethylene glycol, 17 g, Daily  potassium chloride CR, 20 mEq, Once  QUEtiapine, 200 mg, Nightly  rivaroxaban, 20 mg, Daily with evening meal  rOPINIRole, 2 mg, Daily  [Held by provider] sacubitriL-valsartan, 1 tablet, BID  spironolactone, 25 mg, Daily  torsemide, 100 mg, Daily      PRN:  acetaminophen, 975 mg, q6h PRN   Or  acetaminophen, 650 mg, q6h PRN   Or  acetaminophen, 650 mg, q6h PRN  ALPRAZolam, 2 mg, q8h PRN  dextrose 10 % in water (D10W), 0.3 g/kg/hr, Once PRN  dextrose, 25 g, q15 min PRN  diphenhydrAMINE, 25 mg, q6h PRN  glucagon, 1 mg, q15 min PRN  hydrOXYzine HCL, 25 mg, q6h PRN  ondansetron, 4 mg, q6h PRN  oxyCODONE, 5 mg, q4h PRN  oxygen, , Continuous PRN - O2/gases  traZODone, 150 mg, Nightly PRN        Invasive Hemodynamics:    Most Recent Range Past 24hrs   BP (Art) 132/95 Arterial Line BP 1  Min: 82/76  Max: 138/90   MAP(Art) 106 mmHg Arterial Line MAP 1 (mmHg)   Min: 68 mmHg  Max: 117 mmHg   RA/CVP 15 mmHg No data recorded   PA 63/28 No data recorded   PA(mean) 40 mmHg No data recorded   PCWP 22 mmHg No data recorded   CO 3.6 L/min No data recorded   CI 1.47 L/min/m2 No data recorded   Mixed Venous 73 % No data recorded   SVR  1155 (dyne*sec)/cm5 No data recorded    (dyne*sec)/cm5 No data recorded         PHYSICAL EXAM:   Visit Vitals  /88 (BP Location: Right arm)   Pulse (!) 125   Temp 35.6 °C (96.1 °F) (Temporal)   Resp 20   Ht 1.727 m (5' 7.99\")   Wt 138 kg (304 lb 3.8 oz)   SpO2 95%   BMI 46.27 kg/m²   Smoking Status Every Day   BSA 2.57 m²       Wt Readings from Last 5 Encounters:   10/19/23 138 kg (304 lb 3.8 oz)   10/10/23 139 kg (307 lb)   12/08/22 133 kg (293 lb)   11/10/22 133 kg (292 lb 12.8 oz)   04/19/22 120 kg (264 lb 2 oz)       INTAKE/OUTPUT:  I/O last 3 completed shifts:  In: 962.8 (7 mL/kg) [I.V.:962.8 (7 mL/kg)]  Out: 2325 (16.8 mL/kg) [Urine:2325 (0.5 mL/kg/hr)]  Weight: 138 kg      Physical " Exam  Constitutional:       General: He is not in acute distress.     Appearance: He is obese. He is ill-appearing.   Neck:      Vascular: No hepatojugular reflux or JVD.   Cardiovascular:      Rate and Rhythm: Normal rate and regular rhythm.      Pulses: Normal pulses.      Heart sounds: S1 normal and S2 normal.   Pulmonary:      Effort: Pulmonary effort is normal.      Breath sounds: Normal breath sounds. No decreased breath sounds, wheezing, rhonchi or rales.   Abdominal:      General: Bowel sounds are normal. There is distension.      Palpations: Abdomen is soft. There is no hepatomegaly or splenomegaly.      Tenderness: There is no abdominal tenderness.   Genitourinary:     Comments: Voiding freely   Musculoskeletal:      Cervical back: Normal range of motion.      Right lower leg: No edema.      Left lower leg: No edema.   Neurological:      General: No focal deficit present.      Mental Status: He is alert and oriented to person, place, and time. Mental status is at baseline.     DATA:  CMP:  Results from last 7 days   Lab Units 10/19/23  0436 10/18/23  0420 10/17/23  0426 10/16/23  0428 10/15/23  0347 10/14/23  1341 10/14/23  0248 10/13/23  0356   SODIUM mmol/L 132* 133* 134* 134* 136 134* 137 139   POTASSIUM mmol/L 4.3 4.7 4.5 4.3 4.6 4.5 3.9 3.9   CHLORIDE mmol/L 97* 96* 95* 94* 98 94* 95* 99   CO2 mmol/L 27 27 31 29 28 29 28 30   ANION GAP mmol/L 12 15 13 15 15 16 18 14   BUN mg/dL 23 22 20 19 20 21 20 18   CREATININE mg/dL 0.97 1.47* 1.15 1.04 1.13 1.22 1.46* 1.14   EGFR mL/min/1.73m*2 >90 64 86 >90 87 80 64 87   MAGNESIUM mg/dL 2.45* 2.38 2.32 2.19 2.46*  --  1.97 2.09   ALBUMIN g/dL 4.4 4.5 4.6 4.7 4.2 4.4 4.6 4.3     CBC:  Results from last 7 days   Lab Units 10/19/23  0436 10/18/23  0420 10/17/23  0426 10/16/23  0428 10/15/23  0347 10/14/23  0248 10/13/23  0356   WBC AUTO x10*3/uL 6.0 6.4 6.1 5.9 5.1 8.5 6.6   HEMOGLOBIN g/dL 13.1* 13.0* 12.9* 12.8* 12.0* 13.4* 12.9*   HEMATOCRIT % 40.1* 41.9 39.8*  "41.0 37.3* 41.4 41.1   PLATELETS AUTO x10*3/uL 260 314 271 275 242 316 304   MCV fL 77* 80 77* 81 78* 78* 82     COAG:   Results from last 7 days   Lab Units 10/12/23  1732   INR  1.0     ABO: No results found for: \"ABO\"  HEME/ENDO:     CARDIAC:   Results from last 7 days   Lab Units 10/13/23  1259 10/13/23  0356 10/12/23  1732   LD U/L  --   --  153   TROPHS ng/L 18 25  --        ASSESSMENT AND PLAN:   Stephan Gallo is a 34 y/o M with PMHx significant for stage D, NYHA class IV HFrEF (10%) with RV dysfunction s/p ICD (6/2020) now with subcutaneous ICD 2/2 infection, nonischemic cardiomyopathy (2018 2/2 hydrocarbon intoxication), moderate to severe TR, COPD, DAVONTE (not on CPAP), DM2, Anxiety, Depression, PTSD, ADHD, nicotine abuse (current smoker), ETOH abuse, MSSA, GERD, LV thrombus, and PE (on xarelto) who presented to his outpatient clinic visit with Dr. Reeves where he admitted to dyspnea on exertion and significant chest pressure. He was admitted to HFICU 10/10 for further management of Acute Decompensated Heart Failure and potential LVAD therapy work-up. [place]- [place] catheter inserted and with numbers consistent with low output state on his home milrinone dose of 0.25. Milrinone increased to 0.375 mcg/kg/min and advanced therapies email sent 10/12. Milrinone decreased to 0.25 on 10/14 d/t hypotension, but was increased back to 0.375 10/15. Patient was presented to advanced therapy selection committee 10/17 and formally declined for LVAD due to psychosocial concerns including uncontrolled pain, h/o substance use, SI/HI with many hospitalizations. He will be offered to seek second opinion at [facility]. Otherwise plan to optimize medically and discharge home with inotropes and palliative care.     Neuro:  #ADHD  #Depression with history of suicidal ideations   #Chronic pain   #Restless Leg Syndrome   -C/w home ropinirole 2 mg at bedtime   -C/w home quetiapine fumarate 200 mg at bedtime   -C/w home cymbalta 30 mg BID "   -C/w home xanax 2 mg TID PRN for anxiety  -C/w home trazodone 150 mg at night  -Hydroxyzine 25 mg every 6 hours PRN  -Tylenol for pain PRN  -Chronic pain consult, ok to give PO oxycodone 5 mg q4 PRN for short term use only  -Will need chronic pain management to establish long term care plan after discharge   -Serial neuro and pain assessments   -PT/OT Consult, OOB to chair  -CAM ICU score every shift  -Sleep/wake cycle normalization     #Physical Status  -Morbid Obesity- BMI 46     #Substance abuse  History of ETOH abuse (quit in 2020)  Tobacco use/Nicotine Dependence (smokes 1-2 cigarettes daily)  Former cocaine user (quit in his 20s)  Hydrocarbon inhalant abuse (2020)  -Tox screen (10/10) positive for benzodiazepines, but patient is prescribed xanax   -Nicotine screen <15     Cardiovascular:  #NYHA Class IV, Stage D systolic heart failure NICM/HFrEF (15-20%)  #S/p ICD (2020) c/b infection/ endocarditis replaced with subcutaneous ICD (2022)  - Echo (8/30/23): LVEF 15-20%, LV severely dilated. Global hypokinesis of the LV. Moderately reduced RV function. LA mild-moderately dilated. RA is moderately to severely dilated. Moderately elevated PAP.  Closing SGC # (10/14): BP 94/53 (67) CVP 21, PAP 69/42 (53), CO/CI 5.08/2.07, , SvO2 65% on milrinone 0.375, levophed 0.7, empagliflozin 10 mg and spironolactone 25 mg   - Admit weight 139 kg   - Daily weight (10/19): 138 kg  - Admit   - C/w milrinone 0.375 mcg/kg/min  - Discontinue entresto 24/26 mg BID  - Discontinue Hydralazine 25 mg Q8 and isordil 10 mg TID   -C/w empagliflozin increased to 25mg daily (10/19)  -C/w home spironolactone 25 mg daily   -Holding home metoprolol succinate 25 mg daily  -restarted home torsemide 100mg daily   - Spot diurese as needed.   -Daily standing weights, regular diet, 2L fluid restriction, strict I&Os     #Advanced Therapies Evaluation  -Completed workup, presented at selection committee today 10/17. Formally declined for  LVAD. Will offer referral to second opinion at [facility].   -Discussed with him and his mother at bedside about hospice care. Will let them think about his goals of care for now.  -Palliative care following, appreciate assistance     # Chronic Chest Pain with multiple ED visits (30-40 visits)  # Pain management consulted and following   - EKG (10/10/23) sinus tachycardia   -Troponin (10/10) 30  - State EKG and troponin lab with negative results (10/13)     #Nonobstructive CAD   C (2020) at [facility] negative     #HLD  Lipid panel (8/29/23): cholesterol 238, HDL 32, , VLDL 75,   -zetia and lipitor d/c'd--> no long term benefit given his prognosis      #No history of Arrhythmias  -Initially, ICD placed in 2020. Removed due to infection.  -Subcutaneous ICD in place for primary prevention      #Electrolyte Disturbances  -Keep K>4 and Mag>2     Pulmonary:   #Hx of PE (on Xarelto at home)  #DAVONTE (not on CPAP)  CTA (9/22/23) negative for PE  -Continue home xarelto   -Monitor and maintain SpO2 > 92%  -CPAP at bedtime     GI:  #Nausea  -Bowel regimen   -Zofran PRN      :  #TONYA, pre renal iso poor cardiac output  -Baseline BUN/Cr ~15/0.9  -I/Os  -Avoid hypotension and nephrotoxic agents    #Hyponatremia  - Na level on admit 140 down to 132 (10/19)  - daily RFP      #Hx of Epididymal Cystic lesion  US Scrotum w Doppler (3/28/23) A large complex cystic lesion in the left epididymal head is slightly smaller compared to prior imaging with a new echogenic component without internal vascularity. Recommend follow-up as clinically indicated  -Continue to monitor      Heme:  #No active issues   Iron studies (10/10): iron 96, TIBC 409, %sat 23, folate 11.8, ferritin 189, vitamin B12 367     Endo:  #No history of DM2 or thyroid disorder  HgbA1c (8/29) 6.2  -SSI  - Jardiance increased to DM dosing of 25mg daily   TSH (10/10): 2.46       ID:  -Afebrile, nontoxic, no s/s infx  -Trend temps q4h     PHYSICAL AND OCCUPATIONAL  THERAPY:    LINES:  Right subclavian carlson  A-line 10/18-10/19    DVT: SCD's, xarelto   VAP BUNDLE: N/A  ULCER PPX: N/A  GLYCEMIC CONTROL:  SSI and DM dose jardiance   BOWEL CARE: Colace & miralax PRN   INDWELLING CATHETER: None   NUTRITION: Adult diet Carb Controlled; 45 gram carb/meal, 15 gram Carb evening snack      EMERGENCY CONTACT: Extended Emergency Contact Information  Primary Emergency Contact: Cathy Gallo  Home Phone: [phone]  Relation: Parent  Secondary Emergency Contact: Fabiana Gallo  Home Phone: [phone]  Relation: None  FAMILY UPDATE: Palliative following; GOC discussions ongoing  CODE STATUS: Full Code    DISPO:   Transfer to floor     Patient seen and assessed with Dr. Cordero  _________________________________________________  SATHYA Nobles-CNP

## 2023-10-19 NOTE — PROGRESS NOTES
Physical Therapy    Physical Therapy Treatment    Patient Name: Stephan Gallo  MRN: [id number]  Today's Date: 10/19/2023  In Time: 13:25  Out Time: 13:57  SPT under direct supervision of PT           Assessment/Plan   PT Assessment  PT Assessment Results: Decreased strength, Decreased endurance, Impaired balance, Decreased mobility, Pain  Rehab Prognosis: Good  Evaluation/Treatment Tolerance: Patient limited by fatigue, Patient limited by pain  Medical Staff Made Aware: Yes  End of Session Communication: Bedside nurse  End of Session Patient Position: Bed, 3 rail up, Alarm off, not on at start of session  PT Plan  Inpatient/Swing Bed or Outpatient: Inpatient  PT Plan  Treatment/Interventions: Bed mobility, Transfer training, Gait training, Stair training, Balance training, Strengthening, Endurance training, Therapeutic exercise, Therapeutic activity  PT Plan: Skilled PT  PT Frequency: 4 times per week  PT Discharge Recommendations: Low intensity level of continued care  PT Recommended Transfer Status: Assist x1  PT - OK to Discharge: Yes      General Visit Information:   PT  Visit  PT Received On: 10/19/23  General  Prior to Session Communication: Bedside nurse  Patient Position Received: Bed, 3 rail up, Alarm off, not on at start of session  General Comment: Pt on .375 mcg milrinone    Subjective     Precautions:  Precautions  Medical Precautions: Cardiac precautions, Fall precautions    Vital Signs:  Vital Signs  Heart Rate:  (PRE: 130 DURIN POST: 132)  Resp:  (PRE: unable to read DURING: labored breathing with exertion POST: 35)  SpO2:  (PRE: 95 DURING: >92 POST: 97)  BP:  (PRE: 119/85 supine DURIN/80 sitting POST: 157/129 supine, taken again 110/73; RN aware of jump in BP)  BP Location: Right arm  BP Method: Automatic    Objective     Pain:  Pain Assessment  Pain Assessment: 0-10  Pain Score: 7  Pain Type: Chronic pain (RN aware)  Pain Location: Chest (and 6/10 back pain; Pt also reported  "rectal pain 2/2 constipation)  Effect of Pain on Daily Activities: Pain increased with bending, raising arms, and exertion    Cognition:  Cognition  Overall Cognitive Status: Within Functional Limits  Orientation Level: Oriented X4  Impulsive: Mildly (Pt stood up, put shoes on, and was unhooking telemetry wires without approval. Pt stated that he is \"fine\" to do these activities when asked to pause for safety concerns)    Postural Control:  Postural Control  Postural Control: Within Functional Limits    Activity Tolerance:  Activity Tolerance  Activity Tolerance Comments: Pt requiring rest breaks between amb    Treatments:  Therapeutic Exercise  Therapeutic Exercise Performed: Yes  Therapeutic Exercise Activity 1: B ankle pumps in supine x10  Therapeutic Exercise Activity 2: pursed lip breathing x5    Therapeutic Activity  Therapeutic Activity Performed: Yes  Therapeutic Activity 1: Sit EOB for 5 min with feet supported and no UE support (supervision)  Therapeutic Activity 2: Dynamic standing for 3 min CGA with no AD  Therapeutic Activity 3: Pick object off floor with CGA and no AD (x1 trial; SOB and CP increased)    Bed Mobility  Bed Mobility: Yes  Bed Mobility 1  Bed Mobility 1: Supine to sitting  Level of Assistance 1: Distant supervision  Bed Mobility Comments 1: Increase in CP and SOB with bed mobility  Bed Mobility 2  Bed Mobility  2: Sitting to supine  Level of Assistance 2: Close supervision, Moderate verbal cues (Pt was holding breath and cueing given for pursed lip breathing during bed mobility)  Bed Mobility Comments 2: c/o CP with transfer    Ambulation/Gait Training  Ambulation/Gait Training Performed: Yes  Ambulation/Gait Training 1  Surface 1: Level tile  Device 1: No device  Assistance 1: Contact guard, Minimal verbal cues  Quality of Gait 1:  (decreased kendall, wide DONAL)  Comments/Distance (ft) 1: 15 ft; increased HR and SOB with amb. Recovered with rest  Ambulation/Gait Training 2  Surface 2: " Level tile  Device 2: No device  Assistance 2: Contact guard, Minimal verbal cues  Quality of Gait 2:  (decreased kendall, wide DONAL)  Comments/Distance (ft) 2: 70 ft; increased HR and SOB with amb. Recovered with rest  Transfers  Transfer: Yes  Transfer 1  Transfer From 1: Sit to  Transfer to 1: Stand  Technique 1: Sit to stand  Transfer Level of Assistance 1: Close supervision  Trials/Comments 1: x3 trials  Transfers 2  Transfer From 2: Stand to  Transfer to 2: Sit  Technique 2: Stand to sit  Transfer Level of Assistance 2: Close supervision  Trials/Comments 2: x3 trials    Outcome Measures:  [facility] Basic Mobility  Turning from your back to your side while in a flat bed without using bedrails: None  Moving from lying on your back to sitting on the side of a flat bed without using bedrails: A little  Moving to and from bed to chair (including a wheelchair): A little  Standing up from a chair using your arms (e.g. wheelchair or bedside chair): A little  To walk in hospital room: A little  Climbing 3-5 steps with railing: A lot  Basic Mobility - Total Score: 18    FSS-ICU  Ambulation: Walks >/ or equal to 50 feet with any assistance x1  Rolling: Modified independence, requires use of assistive device  Sitting: Supervision or set-up only  Transfer Sit-to-Stand: Supervision or set-up only  Transfer Supine-to-Sit: Supervision or set-up only  Total Score: 23      E = Exercise and Early Mobility  Current Activity: Ambulating in rich    Education Documentation  Mobility Training, taught by JOAQUINA Butler at 10/19/2023  2:53 PM.  Learner: Patient  Readiness: Acceptance  Method: Explanation  Response: Verbalizes Understanding, Demonstrated Understanding    Education Comments  No comments found.        OP EDUCATION:  Education  Individual(s) Educated: Patient  Education Provided: Fall Risk (and pursed lip breathing)  Patient/Caregiver Demonstrated Understanding: yes  Plan of Care Discussed and Agreed Upon: yes  Patient  Response to Education: Patient/Caregiver Verbalized Understanding of Information, Patient/Caregiver Performed Return Demonstration of Exercises/Activities    Encounter Problems       Encounter Problems (Active)       Balance       Score 25/28 on [place] (Met)       Start:  10/12/23    Expected End:  10/26/23    Resolved:  10/16/23    Updated to: Pt will score >45 on JALLOH for safe community ambulation    Update reason: Goal Met         Pt will score >45 on JALLOH for safe community ambulation (Progressing)       Start:  10/16/23    Expected End:  10/26/23                   Mobility       Complete 6MWT, stable vitals, with RPD 3/10 or less, RPE 13/20 or less (Progressing)       Start:  10/12/23    Expected End:  10/26/23            amb x 350 ft, no rest breaks, with stable vitals , and LRD (Progressing)       Start:  10/12/23    Expected End:  10/26/23               Pain - Adult          Transfers       sit<>stand indep (Progressing)       Start:  10/12/23    Expected End:  10/26/23            5x STS from chair, no UE assist, in 15 sec or less  (Met)       Start:  10/12/23    Expected End:  10/26/23    Resolved:  10/16/23    Updated to: Pt will complete 5XSTS from lowest bed height without UE assist <12 seconds with RPD </=3/10, RPE </= 13/20 for improved functional mobility.    Update reason: Goal Met         Pt will complete 5XSTS from lowest bed height without UE assist <12 seconds with RPD </=3/10, RPE </= 13/20 for improved functional mobility. (Progressing)       Start:  10/16/23    Expected End:  10/26/23

## 2023-10-20 NOTE — PROGRESS NOTES
History Of Present Illness  Stephan Gallo is a 33 y.o. male with a pmh of HFrEF (10%) with RV dysfunction, NICM (2/2 hydrocarbon intoxication 2018), moderate to severe TR, COPD, DAVONTE, T2DM, anxiety, depression, PTSD, ADHD, remote etoh abuse, remote nicotine abuse, MSSA, GERD, and PE on xarelto. He presented to outpatient clinic with chest pressure and dyspnea on exertion. He was admitted to HFICU for acute decompensated HF. Pt is not a candidate for advanced therapies. Of note, he is well known to our service from previous admissions for similar concerns and was discharged home on milrinone.     Symptoms - patient seen at bedside asleep with bipap mask    Serious illness conversation - spoke with pt's mother mayank via phone call with [facility] and [facility]   -mayank was very approporiately emotional over the pt being denied for advanced therapies  -she is hopeful that pt wants to seek second opinion at [facility]  -I discussed code status with her, which was very emotional for her to discuss, pt remains full code at this time   -she hopes that pt would be willing to move in with her, as she worries that he may die alone at home and she may find him   -mayank reports being unaware that if he were to proceed with hospice,he would not be on milrinone   --I explained that hospice goals are different, and milrinone is seen as a life-prolonging treatment, which is not in the hospice philosophy of care  -she is welcoming of continued support from our team and assistance with goals of care    Physical Exam  Physical Exam  Constitutional:       Appearance: He is obese. He is ill-appearing.   HENT:      Head: Normocephalic.      Mouth/Throat:      Mouth: Mucous membranes are dry.   Eyes:      Pupils: Pupils are equal, round, and reactive to light.   Cardiovascular:      Rate and Rhythm: Tachycardia present.   Pulmonary:      Comments: tachypnea  Abdominal:      Comments: rounded   Musculoskeletal:       Comments: weakness   Skin:     General: Skin is warm and dry.   Neurological:      General: No focal deficit present.      Mental Status: He is oriented to person, place, and time.   Psychiatric:         Mood and Affect: Mood normal.         Behavior: Behavior normal.      Comments: Calm, appropriate     Results for orders placed or performed during the hospital encounter of 10/10/23 (from the past 24 hour(s))   POCT GLUCOSE   Result Value Ref Range    POCT Glucose 193 (H) 74 - 99 mg/dL   POCT GLUCOSE   Result Value Ref Range    POCT Glucose 174 (H) 74 - 99 mg/dL   POCT GLUCOSE   Result Value Ref Range    POCT Glucose 235 (H) 74 - 99 mg/dL   CBC   Result Value Ref Range    WBC 5.9 4.4 - 11.3 x10*3/uL    nRBC 0.0 0.0 - 0.0 /100 WBCs    RBC 5.40 4.50 - 5.90 x10*6/uL    Hemoglobin 13.7 13.5 - 17.5 g/dL    Hematocrit 45.0 41.0 - 52.0 %    MCV 83 80 - 100 fL    MCH 25.4 (L) 26.0 - 34.0 pg    MCHC 30.4 (L) 32.0 - 36.0 g/dL    RDW 15.4 (H) 11.5 - 14.5 %    Platelets 283 150 - 450 x10*3/uL    MPV 9.1 7.5 - 11.5 fL   Renal function panel   Result Value Ref Range    Glucose 204 (H) 74 - 99 mg/dL    Sodium 136 136 - 145 mmol/L    Potassium 4.3 3.5 - 5.3 mmol/L    Chloride 96 (L) 98 - 107 mmol/L    Bicarbonate 27 21 - 32 mmol/L    Anion Gap 17 10 - 20 mmol/L    Urea Nitrogen 25 (H) 6 - 23 mg/dL    Creatinine 1.22 0.50 - 1.30 mg/dL    eGFR 80 >60 mL/min/1.73m*2    Calcium 10.0 8.6 - 10.6 mg/dL    Phosphorus 3.9 2.5 - 4.9 mg/dL    Albumin 4.9 3.4 - 5.0 g/dL   Magnesium   Result Value Ref Range    Magnesium 2.21 1.60 - 2.40 mg/dL   POCT GLUCOSE   Result Value Ref Range    POCT Glucose 197 (H) 74 - 99 mg/dL        Problem List Items Addressed This Visit          Cardiac and Vasculature    Heart failure with reduced ejection fraction (CMS/HCC)    Relevant Medications    milrinone (Primacor) 20 mg in dextrose 5% 100 mL (0.2 mg/mL) infusion (premix)    Other Relevant Orders    Vascular US carotid artery duplex bilateral  (Completed)    Cardiac device check - Inpatient (Completed)    Vascular US Ankle Brachial Index (LEANDER) Without Exercise (Completed)    * (Principal) Acute decompensated heart failure (CMS/HCC) - Primary    Relevant Medications    milrinone (Primacor) 20 mg in dextrose 5% 100 mL (0.2 mg/mL) infusion (premix)    Other Relevant Orders    Referral to Home Health    Venous Access, Home Infusion    Line Care    CPAP therapy     Other Visit Diagnoses       Peripheral vascular disease, unspecified (CMS/HCC)        Other specified symptoms and signs involving the circulatory and respiratory systems               Posing threat to life and function     #complex medical decision making   #goals of care  #advance care planning   -code status: Full, recommend addressing in subsequent visits  -surrogate decision maker: Mother Cahty  -Goal: To get second opinion at [facility].  Patient has to go home on inotropes and be readmitted at [facility], unable to do hospital to hospital transfer at this time per primary team fellow  -We will continue to follow as needed for ongoing goals of care  -Outpatient palliative care referral at discharge (Navigator program through hospice [facility])--note, referral had been placed before but patient was not home long enough to establish care  -I reached out to Dr. Ross to discuss plan of care (pt has appt with Dr. Ross to establish primary care )     #Coping  - Music therapy  - Chaplaincy  - Social work support  -Recommend further goals of care conversations with patient's mother     I spent 50 minutes in the care of this patient.  I spent an additional 1 hour in ACP     Medical Decision Making was high level due to high complexity of problems, extensive data review, and high risk of management/treatment.       Thank you for allowing us to care for this patient. Palliative will continue to follow as needed.   Palliative medicine is available Monday-Friday, 8a-6p. Please contact team with any questions or  concerns.  Team pager [phone]  Lauren Perez DNP, CNP

## 2023-10-20 NOTE — CARE PLAN
Problem: Pain  Goal: My pain/discomfort is manageable  Outcome: Progressing     Problem: Daily Care  Goal: Daily care needs are met  Outcome: Progressing     Problem: Psychosocial Needs  Goal: Demonstrates ability to cope with hospitalization/illness  Outcome: Progressing  Goal: Collaborate with me, my family, and caregiver to identify my specific goals  Outcome: Progressing   The patient's goals for the shift include      The clinical goals for the shift include Patient will remain stable with controlled pain.

## 2023-10-20 NOTE — PROGRESS NOTES
Subjective Data:  Patient seen this morning with CPAP on, reports abdominal bloating and chronic chest pains. Arranging for home care to be set up with new milrinone dosing.     - need home care set up for home milrinone infusion  - discuss with heart failure milrinone dose with trigeminy seen on tele     Overnight Events:    Transferred out of HFICU to 5.     Objective Data:  Last Recorded Vitals:  Vitals:    10/20/23 0010 10/20/23 0532 10/20/23 0829 10/20/23 1236   BP: 102/68 117/77 125/78 111/75   BP Location:   Right arm Right arm   Patient Position:   Lying Lying   Pulse: (!) 112 (!) 126 70 (!) 115   Resp: 21 20 20 18   Temp: 36.2 °C (97.2 °F) 36.1 °C (96.9 °F) 35.9 °C (96.7 °F) 35.4 °C (95.8 °F)   TempSrc: Temporal Temporal Temporal Temporal   SpO2: 97% 94% 97% 94%   Weight:  135 kg (296 lb 11.8 oz)     Height:           Last Labs:  CBC - 10/20/2023:  7:19 AM  5.9 13.7 283    45.0      CMP - 10/20/2023:  7:19 AM  10.0 7.5 28 --- 0.5   3.9 4.9 50 85      PTT - 10/12/2023:  5:32 PM  1.0   11.4 60     TROPHS   Date/Time Value Ref Range Status   10/13/2023 12:59 PM 18 0 - 53 ng/L Final   10/13/2023 03:56 AM 25 0 - 53 ng/L Final   10/10/2023 07:10 PM 30 0 - 53 ng/L Final     BNP   Date/Time Value Ref Range Status   10/10/2023 07:10  0 - 99 pg/mL Final   09/25/2023 10:54 AM CANCELED       Comment:     .  <100 pg/mL - Heart failure unlikely  100-299 pg/mL - Intermediate probability of acute heart  .               failure exacerbation. Correlate with clinical  .               context and patient history.    >=300 pg/mL - Heart Failure likely. Correlate with clinical  .               context and patient history.   Biotin interference may cause falsely decreased results.   Patients taking a Biotin dose of up to 5 mg/day should   refrain from taking Biotin for 24 hours before sample   collection. Providers may contact their local laboratory   for further information.    Result canceled by the ancillary.      09/23/2023 06:15  0 - 99 pg/mL Final     Comment:     .  <100 pg/mL - Heart failure unlikely  100-299 pg/mL - Intermediate probability of acute heart  .               failure exacerbation. Correlate with clinical  .               context and patient history.    >=300 pg/mL - Heart Failure likely. Correlate with clinical  .               context and patient history.   Biotin interference may cause falsely decreased results.   Patients taking a Biotin dose of up to 5 mg/day should   refrain from taking Biotin for 24 hours before sample   collection. Providers may contact their local laboratory   for further information.       HGBA1C   Date/Time Value Ref Range Status   08/29/2023 06:46 PM 6.2 % Final     Comment:          Diagnosis of Diabetes-Adults   Non-Diabetic: < or = 5.6%   Increased risk for developing diabetes: 5.7-6.4%   Diagnostic of diabetes: > or = 6.5%  .       Monitoring of Diabetes                Age (y)     Therapeutic Goal (%)   Adults:          >18           <7.0   Pediatrics:    13-18           <7.5                   7-12           <8.0                   0- 6            7.5-8.5   American Diabetes Association. Diabetes Care 33(S1), Jan 2010.     06/13/2023 05:37 AM 8.5 4.3 - 5.6 % Final     Comment:     American Diabetes Association guidelines indicate that patients with HgbA1c in the range 5.7-6.4% are at increased risk for development of diabetes, and intervention by lifestyle modification may be beneficial. HgbA1c greater or equal to 6.5% is considered diagnostic of diabetes.   04/24/2023 06:30 AM 7.2 4.0 - 6.0 % Final     Comment:     Hemoglobin A1C levels are related to mean blood glucose during the   preceding 2-3 months. The relationship table below may be used as a   general guide. Each 1% increase in HGB A1C is a reflection of an   increase in mean glucose of approximately 30 mg/dl.   Reference: Diabetes Care, volume 29, supplement 1 Jan. 2006                        HGB A1C  "................. Approx. Mean Glucose   _______________________________________________   6%   ...............................  120 mg/dl   7%   ...............................  150 mg/dl   8%   ...............................  180 mg/dl   9%   ...............................  210 mg/dl   10%  ...............................  240 mg/dl  Performed at [address] [address]     06/15/2022 07:13 PM 6.8 4.3 - 5.6 % Final     Comment:     American Diabetes Association guidelines indicate that patients with HgbA1c in the range 5.7-6.4% are at increased risk for development of diabetes, and intervention by lifestyle modification may be beneficial. HgbA1c greater or equal to 6.5% is considered diagnostic of diabetes.     LDLCALC   Date/Time Value Ref Range Status   04/24/2023 06:30  65 - 130 MG/DL Final     VLDL   Date/Time Value Ref Range Status   08/29/2023 06:46 PM 75 0 - 40 mg/dL Final   05/25/2023 06:58 AM 72 0 - 40 mg/dL Final   01/06/2023 06:35 AM 26 0 - 40 mg/dL Final      Last I/O:  I/O last 3 completed shifts:  In: 1342.4 (10 mL/kg) [P.O.:710; I.V.:632.4 (4.7 mL/kg)]  Out: 5700 (42.3 mL/kg) [Urine:5700 (1.2 mL/kg/hr)]  Weight: 134.6 kg     Past Cardiology Tests (Last 3 Years):  EKG:  No results found for this or any previous visit from the past 1095 days.    Echo:  No results found for this or any previous visit from the past 1095 days.    Ejection Fractions:  No results found for: \"EF\"  Cath:  No results found for this or any previous visit from the past 1095 days.    Stress Test:  No results found for this or any previous visit from the past 1095 days.    Cardiac Imaging:  No results found for this or any previous visit from the past 1095 days.      Inpatient Medications:  Scheduled medications   Medication Dose Route Frequency    docusate sodium  100 mg oral BID    DULoxetine  30 mg oral BID    empagliflozin  25 mg oral Daily    insulin lispro  0-5 Units subcutaneous TID with " meals    polyethylene glycol  17 g oral Daily    QUEtiapine  200 mg oral Nightly    rivaroxaban  20 mg oral Daily with evening meal    rOPINIRole  2 mg oral Daily    spironolactone  25 mg oral Daily    torsemide  100 mg oral Daily     PRN medications   Medication    acetaminophen    Or    acetaminophen    Or    acetaminophen    ALPRAZolam    dextrose 10 % in water (D10W)    dextrose    diphenhydrAMINE    glucagon    hydrOXYzine HCL    ondansetron    oxyCODONE    oxygen    traZODone     Continuous Medications   Medication Dose Last Rate    milrinone  0.375 mcg/kg/min 0.375 mcg/kg/min (10/20/23 1326)       Physical Exam:  General: NAD, lying in bed with CPAP  Skin: warm and dry, right chest wall Agrawal   Head/ neck: unable to assess JVD 2/2 body habitus   Cardiac: RRR, S1, S2   Pulm: diminished throughout, CPAP in place   GI: soft, nontender   Extremities: no LE edema   Neuro: no focal neuro deficits   Psych: flat affect, cooperative        Assessment/Plan     #ADHD  #Depression with history of suicidal ideations   #Chronic pain   #Restless Leg Syndrome   -C/w home ropinirole 2 mg at bedtime   -C/w home quetiapine fumarate 200 mg at bedtime   -C/w home cymbalta 30 mg BID   -C/w home xanax 2 mg TID PRN for anxiety  -C/w home trazodone 150 mg at night  -Hydroxyzine 25 mg every 6 hours PRN  -Tylenol for pain PRN  -Chronic pain consult, ok to give PO oxycodone 5 mg q4 PRN for short term use only  -Will need chronic pain management to establish long term care plan after discharge      #Substance abuse  History of ETOH abuse (quit in 2020)  Tobacco use/Nicotine Dependence (smokes 1-2 cigarettes daily)  Former cocaine user (quit in his 20s)  Hydrocarbon inhalant abuse (2020)  -Tox screen (10/10) positive for benzodiazepines, but patient is prescribed xanax   -Nicotine screen <15     Cardiovascular:  #NYHA Class IV, Stage D systolic heart failure NICM/HFrEF (15-20%)  #S/p ICD (2020) c/b infection/ endocarditis replaced with  subcutaneous ICD (2022)  - Echo (8/30/23): LVEF 15-20%, LV severely dilated. Global hypokinesis of the LV. Moderately reduced RV function. LA mild-moderately dilated. RA is moderately to severely dilated. Moderately elevated PAP.  Closing SGC # (10/14): BP 94/53 (67) CVP 21, PAP 69/42 (53), CO/CI 5.08/2.07, , SvO2 65% on milrinone 0.375, levophed 0.7, empagliflozin 10 mg and spironolactone 25 mg   - Admit weight 139 kg   - Daily weight: 135kg   - Admit   - C/w milrinone 0.375 mcg/kg/min (home milrinone dose 0.25mcg/kg/min)   - discussing milrinone dose with HF, trigeminy seen on tele this AM  - Discontinued entresto 24/26 mg BID, Hydralazine 25 mg Q8 and isordil 10 mg TID, Toprol   -C/w empagliflozin 10 mg daily   -C/w home spironolactone 25 mg daily   - Continue home diuretic 100mg torsemide daily  -Daily standing weights, regular diet, 2L fluid restriction, strict I&Os     #Advanced Therapies Evaluation  -Completed workup, presented at selection committee today 10/17. Formally declined for LVAD. Will offer referral to second opinion at [facility].   -Discussed with him and his mother at bedside about hospice care. Will let them think about his goals of care for now.  -Palliative care following, appreciate assistance     # Chronic Chest Pain with multiple ED visits (30-40 visits)  # Pain management consulted and following   - EKG (10/10/23) sinus tachycardia   -Troponin (10/10) 30  - State EKG and troponin lab with negative results (10/13)     #Nonobstructive CAD   [facility] (2020) at [place] negative     #HLD  Lipid panel (8/29/23): cholesterol 238, HDL 32, , VLDL 75,   -C/w home ezetimibe 10 mg daily   -C/w home atorvastatin 40 mg at night      #No history of Arrhythmias  -Initially, ICD placed in 2020. Removed due to infection.  -Subcutaneous ICD in place for primary prevention      Pulmonary:   #Hx of PE (on Xarelto at home)  #DAVONTE (not on CPAP)  CTA (9/22/23) negative for PE  -Continue home xarelto    -Monitor and maintain SpO2 > 92%  -CPAP at bedtime     #Hx of Epididymal Cystic lesion  US Scrotum w Doppler (3/28/23) A large complex cystic lesion in the left epididymal head is slightly smaller compared to prior imaging with a new echogenic component without internal vascularity. Recommend follow-up as clinically indicated  -Continue to monitor      Thromboprophylaxis: SCDs, xarelto  Code Status: Full Code    Dispo  - pending home care set up for home inotrope/ new dosing     Seen and discussed with Dr Coffey     Code Status:  Full Code    I spent 30 minutes in the professional and overall care of this patient.        Beverly Rollins, APRN-CNP

## 2023-10-20 NOTE — PROGRESS NOTES
Stephan Gallo IS A RETURNING CLIENT TO [facility] FOR RECEIPT OF CONTINUOUS MILRINONE FOR CLASS IV STAGE D HEART FAILURE... PT WITH EXTENSIVE PMH...PATIENT WAS ADMITTID FOR EXACERBATION AND INCREASED MILRINONE RATE..MOTHER AND CAREGIVER SARAHI TO CONTINUE TO ASSIST  DOSING AT 0.375MCG/KG/MIN...DOSING WEIGHT 148KG (326 LBS)    PATIENT WILL INFUSE AT 16.7 ML/HR. ROUNDED TO 16.7 TO MATCH HOSPITAL RATE. CALCULATIONS CHECKED BY SECOND RPG ...BAG CHANGES EVERY 48 HOURS    CAREPLAN UPDATED    THE PLAN IS FOR PATIENT TO BE HOOKED UP AT HOSPITAL ON 10/21/23 AROUND 1PM...DELIVER TO HOSPITAL BED (LT 5083-A) ON 10/20/23  BY 8PM. DELIVERY INFO RELAYED TO MOTHER SUCCESSFULY    PROCESSED FILL FOR 34 X 48HR MILRINONE BAGS WITH TUBING ATTACHED WITH 2 CADD PUMPS AND 1 LITER CADD BAG FOR 10/20/23 MIX AND STRAIGHT DELIVERY BY 8PM TO COVER HOOKUP 10/21  10/23 AND 10/25/23.    FU 10/24/23 WITH NEXT FILL STRAIGHT. CHECK PROGRESS AND WEIGHT

## 2023-10-20 NOTE — PROGRESS NOTES
Occupational Therapy    Occupational Therapy Treatment    Name: Stephan Gallo  MRN: [id number]  : 1990  Date: 10/20/23  Time Calculation  Start Time: 1414  Stop Time: 1431  Time Calculation (min): 17 min    Assessment:  OT Assessment: decreaesd ADLs  Evaluation/Treatment Tolerance: Patient limited by fatigue  Medical Staff Made Aware: Yes  End of Session Communication: Bedside nurse  End of Session Patient Position:  (bathroom call light in reach)  Plan:  Treatment Interventions: ADL retraining, Functional transfer training, Endurance training, Equipment evaluation/education, Compensatory technique education  OT Frequency: 2 times per week  OT Discharge Recommendations: Low intensity level of continued care  OT - OK to Discharge: Yes    Subjective   General:  OT Last Visit  OT Received On: 10/20/23  General  Reason for Referral: LUTHER, LVAD work up  Past Medical History Relevant to Rehab: stage D, NYHA class IV HFrEF (10%) with RV dysfunction s/p ICD (2020, moderate to severe TR, COPD, DAVONTE (not on CPAP), DM2, Anxiety, Depression, PTSD, ADHD, nicotine abuse (current smoker), ETOH abuse, MSSA, GERD, LV thrombus, and PE (on xarelto)  Missed Visit: Yes  Missed Visit Reason: Patient refused  Family/Caregiver Present: No  Prior to Session Communication: Bedside nurse  Patient Position Received: Bed, 3 rail up, Alarm off, not on at start of session  General Comment: Pt on .375 mcg milrinone  Vitals:     Pain Assessment:        Objective   Activities of Daily Living:      Grooming  Grooming Level of Assistance:  (face and B hand washing wipes EOB I setup)    UE Bathing  UE Bathing Level of Assistance:  (bathing front and BUE I setup EOB, wipes back bathing max A EOB)    LE Bathing  LE Bathing Level of Assistance:  (BLE bathing wipes EOB SBA)           Bed Mobility/Transfers: Bed Mobility  Bed Mobility: Yes  Bed Mobility 1  Level of Assistance 1:  (sup to sit SBA, SOB minimal activity)    Transfers  Transfer:  Yes  Transfer 1  Transfer Level of Assistance 1:  (sit to stand S, pt performed fxnl mob bed to bathroom S)        Cognitive Skill Development: OX4       Other Activity:    Home environment:           Outcome Measures:  [facility] Daily Activity  Putting on and taking off regular lower body clothing: A little  Bathing (including washing, rinsing, drying): A little  Putting on and taking off regular upper body clothing: None  Toileting, which includes using toilet, bedpan or urinal: None  Taking care of personal grooming such as brushing teeth: A little  Eating Meals: None  Daily Activity - Total Score: 21        Education Documentation  No documentation found.  Education Comments  No comments found.      Goals:  Encounter Problems       Encounter Problems (Active)       ADLs       Patient will perform UB and LB bathing  with independent level of assistance and raised toilet seat. (Progressing)       Start:  10/16/23    Expected End:  10/30/23            Patient with complete upper body dressing with independent level of assistance donning and doffing all UE clothes with no adaptive equipment while sitting or standing. (Progressing)       Start:  10/16/23    Expected End:  10/30/23            Patient with complete lower body dressing with independent level of assistance donning and doffing all LE clothes  with no adaptive equipment while sitting/standing. (Progressing)       Start:  10/16/23    Expected End:  10/30/23            Patient will complete daily grooming tasks  with independent level of assistance and PRN adaptive equipment while standing sink side and completing set up. (Progressing)       Start:  10/16/23    Expected End:  10/30/23                 COGNITION/SAFETY       Patient will score WFL on standardized cognitive assessment without cues and within reasonable time frame (Progressing)       Start:  10/16/23    Expected End:  10/30/23               EXERCISE/STRENGTHENING       Patient with increase BUE  stregnth to 4+/4 and bilateral  by 5 pound bilateral UE  (Progressing)       Start:  10/16/23    Expected End:  10/30/23               MOBILITY       Patient will perform Functional mobility  Household distances/Community Distances with independent level of assistance and least restrictive device in order to improve safety and functional mobility. (Progressing)       Start:  10/16/23    Expected End:  10/30/23                 TRANSFERS       Patient will perform bed mobility independent level of assistance and bed rails in order to improve safety and independence with mobility (Progressing)       Start:  10/16/23    Expected End:  10/30/23            Patient will complete functional transfer to all surfaces with least restrictive device with independent level of assistance. (Progressing)       Start:  10/16/23    Expected End:  10/30/23              Olesya Jones OT

## 2023-10-20 NOTE — PROGRESS NOTES
Pt sleeping with CPAP when rounding this morning.  FERNANDO and Lauren Perez DNP called pt's mom Cathy  [phone] to talk about next steps.  She is very upset as to how pt's hospital course and plans moving forward have come about.  FERNANDO explained that with current plan for pt to return home on milrinone, that we are not looking at hospice at this time.  She is happy that a palliative care team will become involved for another layer of support and to make sure her pain meds and meds for SOB are helping.  She knows that pt needs to establish with a new PCP (whose appt is on Monday) so that all of his outpt medications can be refilled.  Referral made to R navigator program for followup after discharge.    An Lindsey LCSW

## 2023-10-20 NOTE — CONSULTS
"Nutrition Follow Up Assessment:       Patient is a 33 y.o. male presenting with: Acute decompensated Heart failure. PMH of T2DM, GERD, HTN, Generalized anxiety, Anemia, HLD, Obesity      Nutrition History:  Food and Nutrient History: Food Allergies/Intolerances:  None  Energy intake: Energy Intake: Fair 50-75 %  GI Symptoms: Nausea  Oral Problems: None    Anthropometrics:  Height: 172.7 cm (5' 7.99\")  Weight: 135 kg (297 lb 9.9 oz)  BMI (Calculated): 45.26  IBW/kg (Dietitian Calculated): 67.27 kg  Percent of IBW: 201 %  Adjusted Body Weight (kg): 185.25 kg    Objective/Subjective Weight History:   10/13 141 kg  10/20 138 kg    Weight Change %:  Weight History / % Weight Change: Noting a 3.6% significant likely related to fluid.  Significant Weight Loss: Yes  Interpretation of Weight Loss: >2% in 1 week     Nutrition Focused Physical Exam Findings:    Subcutaneous Fat Loss:   Orbital Fat Pads: Well nourshed (slightly bulging fat pads)   Buccal Fat Pads: Well nourished (full, rounded cheeks)   Triceps: Well nourished (ample fat tissue)   Muscle Wasting:  Temporalis: Well nourished (well-defined muscle)  Pectoralis (Clavicular Region): Well nourished (clavicle not visible)  Deltoid/Trapezius: Well nourished (rounded appearance at arm, shoulder, neck)  Edema:  Edema: none   Edema Location: No edema noted 10/18     Objective Data:  Nutrition Significant Labs:  Results for orders placed or performed during the hospital encounter of 10/10/23 (from the past 24 hour(s))   POCT GLUCOSE   Result Value Ref Range    POCT Glucose 193 (H) 74 - 99 mg/dL   POCT GLUCOSE   Result Value Ref Range    POCT Glucose 174 (H) 74 - 99 mg/dL   POCT GLUCOSE   Result Value Ref Range    POCT Glucose 235 (H) 74 - 99 mg/dL   Renal function panel   Result Value Ref Range    Glucose 204 (H) 74 - 99 mg/dL    Sodium 136 136 - 145 mmol/L    Potassium 4.3 3.5 - 5.3 mmol/L    Chloride 96 (L) 98 - 107 mmol/L    Bicarbonate 27 21 - 32 mmol/L    Anion Gap 17 " 10 - 20 mmol/L    Urea Nitrogen 25 (H) 6 - 23 mg/dL    Creatinine 1.22 0.50 - 1.30 mg/dL    eGFR 80 >60 mL/min/1.73m*2    Calcium 10.0 8.6 - 10.6 mg/dL    Phosphorus 3.9 2.5 - 4.9 mg/dL    Albumin 4.9 3.4 - 5.0 g/dL   Magnesium   Result Value Ref Range    Magnesium 2.21 1.60 - 2.40 mg/dL   POCT GLUCOSE   Result Value Ref Range    POCT Glucose 197 (H) 74 - 99 mg/dL     Nutrition Specific Mediations:  All med's reviewed noting- docusate, spironolactone, torsemide   I/O:     Intake/Output Summary (Last 24 hours) at 10/20/2023 1508  Last data filed at 10/20/2023 1236  Gross per 24 hour   Intake 729.76 ml   Output 2700 ml   Net -1970.24 ml       Dietary Orders (From admission, onward)       Start     Ordered    10/16/23 1841  Adult diet Carb Controlled; 45 gram carb/meal, 15 gram Carb evening snack  Diet effective now        Question Answer Comment   Diet type Carb Controlled    Carb diet selection: 45 gram carb/meal, 15 gram Carb evening snack        10/16/23 1841                     Estimated Needs:   Total Energy Estimated Needs (kCal): 2264 kCalMethod for Estimating Needs: MSJ  Total Estimated Energy Need per Day (kCal/kg): 17 kCal/kg  Total Protein Estimated Needs (g): 80.4 g  Method for Estimating Needs: 1.2 gm/kg x  IBW     Nutrition Diagnosis:  Malnutrition Diagnosis  Patient has Malnutrition Diagnosis: No    Nutrition Diagnosis  Patient has Nutrition Diagnosis: Yes  Diagnosis Status (1): New  Nutrition Diagnosis 1: Inadequate energy intake  Related to (1): Nausea  As Evidenced by (1): Pt report,  consuming >50%    Nutrition Interventions and Recommendations:        Nutrition Prescription:  Recommend increase Adult carb controlled diet to 75 gm CHO  Recommend Ensure plus once a day to aid in PO intake- provides additional 350 kcals and 13gm PRO    Nutrition Education:    Review Supplement use and benefits    Monitoring/Evaluation:   Food/Nutrient Related History Monitoring  Monitoring and Evaluation Plan: Energy  intake  Energy Intake: Estimated energy intake  Criteria: Pt will consume >50% of meals and supplements    Time Spent/Follow-up Reminder:   Follow Up  Time Spent (min): 60 minutes  Last Date of Nutrition Visit: 10/20/23  Nutrition Follow-Up Needed?: Dietitian to reassess per policy

## 2023-10-21 PROBLEM — I10 HTN (HYPERTENSION): Status: ACTIVE | Noted: 2020-04-09

## 2023-10-21 PROBLEM — I50.43 ACUTE ON CHRONIC COMBINED SYSTOLIC AND DIASTOLIC HRT FAIL (MULTI): Status: ACTIVE | Noted: 2023-01-01

## 2023-10-21 PROBLEM — E11.9 T2DM (TYPE 2 DIABETES MELLITUS) (MULTI): Status: ACTIVE | Noted: 2020-04-15

## 2023-10-21 NOTE — PROGRESS NOTES
"Per NP Carbon pt to be discharged today. [facility] pt was \"processed for Hospital hook up 10/21. They did not put the time of the hook up\". [facility] made aware that per weekend TCC sign out 1pm was confirmed. NP included in correspondance.   "

## 2023-10-21 NOTE — CARE PLAN
The patient's goals for the shift include      The clinical goals for the shift include Patient will remain stable with controlled pain.    Over the shift, the patient did not make progress toward the following goals. Barriers to progression include pain less than 4/10. Recommendations to address these barriers include prn oxycodone and scheduled tylenol.

## 2023-10-21 NOTE — CARE PLAN
The patient's goals for the shift include      The clinical goals for the shift include Patient pain will be 5/10 or less by end of shift    Over the shift, the patient pain decreasing and he is ready for discharge once his IV milrinone is hooked up..Oxy and torsemide was delivered SAVITA Hayes RN

## 2023-10-21 NOTE — DISCHARGE SUMMARY
Discharge Diagnosis  Acute decompensated heart failure (CMS/Formerly [facility])    Issues Requiring Follow-Up  Patient may decide to seek second opinion at [facility] for Stage D Heart Failure Advanced Therapies.     Educated patient about importance of establishing primary care and keeping appointment arranged for Monday.    Test Results Pending At Discharge  Pending Labs       Order Current Status    Extra Urine Gray Tube Collected (10/12/23 3166)    Urinalysis with Reflex Microscopic and Culture In process            Hospital Course  Stephan Gallo is a 32 y/o M with PMHx significant for stage D, NYHA class IV HFrEF (10%) with RV dysfunction s/p ICD (6/2020) now with subcutaneous ICD 2/2 infection, nonischemic cardiomyopathy (2018 2/2 hydrocarbon intoxication), moderate to severe TR, COPD, DAVONTE (not on CPAP), DM2, Anxiety, Depression, PTSD, ADHD, nicotine abuse (current smoker), ETOH abuse, MSSA, GERD, LV thrombus, and PE (on xarelto) who presented to his outpatient clinic visit with Dr. Reeves where he admitted to dyspnea on exertion and significant chest pressure. He was admitted to HFICU 10/10 for further management of Acute Decompensated Heart Failure and LVAD therapy work-up. [place] catheter inserted and with numbers consistent with low output state (10/11 opening numbers RA 8, PA 39/22 (27), W 17, , SVO2 72%, CO/CI 5.4/2.2 on his home milrinone dose of 0.25. Milrinone increased to 0.375 mcg/kg/min and advanced therapies email sent 10/12. Milrinone decreased to 0.25 on 10/14 d/t hypotension, but was increased back to 0.375 10/15. Closing SGC # (10/14): BP 94/53 (67) CVP 21, PAP 69/42 (53), CO/CI 5.08/2.07, , SvO2 65% on milrinone 0.375, levophed 0.7, empagliflozin 10 mg and spironolactone 25 mg. Patient was resumed on torsemide at 100mg daily with robust urine output (2+Liters out). Patient was presented to advanced therapy selection committee 10/17 and formally declined for LVAD due to psychosocial concerns  including uncontrolled pain, h/o substance use, SI/HI with many hospitalizations. He will be offered to seek second opinion at [facility]. Otherwise plan to optimize medically and discharge home with inotropes and palliative care.     Floor Course:     New milrinone dose submitted to home care and insurance for approval. Marleny arranged for 10/21/23.     Discharge weight: 134 kg    After all labs and VS were reviewed the decision was made that the patient was medically stable for discharge.  The patient was discharged in satisfactory condition.    More than 30 minutes were spent in coordinating patient discharge.     Pertinent Physical Exam At Time of Discharge  Physical Exam  Constitutional:       Appearance: Normal appearance. He is obese.   Cardiovascular:      Rate and Rhythm: Regular rhythm. Tachycardia present.   Pulmonary:      Effort: Pulmonary effort is normal.   Abdominal:      Palpations: Abdomen is soft.      Tenderness: There is no abdominal tenderness.   Musculoskeletal:      Right lower leg: No edema.      Left lower leg: No edema.   Skin:     General: Skin is warm and dry.      Comments: Right chest wall [place] catheter   Neurological:      General: No focal deficit present.      Mental Status: He is alert and oriented to person, place, and time.         Home Medications     Medication List      START taking these medications     milrinone in 5 % dextrose 20 mg/100 mL (200 mcg/mL) infusion; Commonly   known as: Primacor; Infuse 55.5 mcg/min at 16.65 mL/hr into a venous   catheter continuously.; Replaces: milrinone in 5 % dextrose 40 mg/200 mL   (200 mcg/mL) infusion   oxyCODONE 5 mg immediate release tablet; Commonly known as: Roxicodone;   Take 1 tablet (5 mg) by mouth every 4 hours if needed for severe pain (7 -   10) for up to 3 days.     CHANGE how you take these medications     * acetaminophen 325 mg tablet; Commonly known as: Tylenol; What changed:   Another medication with the same name was added.  Make sure you understand   how and when to take each.   * acetaminophen 325 mg tablet; Commonly known as: Tylenol; Take 3   tablets (975 mg) by mouth every 6 hours if needed for mild pain (1 - 3).;   What changed: You were already taking a medication with the same name, and   this prescription was added. Make sure you understand how and when to take   each.  * This list has 2 medication(s) that are the same as other medications   prescribed for you. Read the directions carefully, and ask your doctor or   other care provider to review them with you.     CONTINUE taking these medications     ALPRAZolam 2 mg tablet; Commonly known as: Xanax   Benadryl Allergy 25 mg tablet; Generic drug: diphenhydrAMINE   cholecalciferol 25 MCG (1000 UT) capsule; Commonly known as: Vitamin D-3   cyanocobalamin 1,000 mcg tablet; Commonly known as: Vitamin B-12   DULoxetine 30 mg DR capsule; Commonly known as: Cymbalta   empagliflozin 10 mg; Commonly known as: Jardiance   ezetimibe 10 mg tablet; Commonly known as: Zetia   gabapentin 100 mg capsule; Commonly known as: Neurontin; TAKE 2 CAPSULES   BY MOUTH THREE TIMES A DAY.   hydrOXYzine HCL 50 mg tablet; Commonly known as: Atarax   magnesium oxide 400 mg (241.3 mg magnesium) tablet; Commonly known as:   Mag-Ox   polyethylene glycol 17 gram/dose powder; Commonly known as: Glycolax,   Miralax   QUEtiapine 200 mg tablet; Commonly known as: SEROquel   rivaroxaban 20 mg tablet; Commonly known as: Xarelto   rOPINIRole 2 mg tablet; Commonly known as: Requip   sennosides-docusate sodium 8.6-50 mg tablet; Commonly known as:   Keyonna-Colace   spironolactone 25 mg tablet; Commonly known as: Aldactone   torsemide 100 mg tablet; Commonly known as: Demadex; Take 1 tablet (100   mg) by mouth once daily.   traZODone 150 mg tablet; Commonly known as: Desyrel     STOP taking these medications     HumaLOG KwikPen Insulin 100 unit/mL injection; Generic drug: insulin   lispro   milrinone in 5 % dextrose 40  mg/200 mL (200 mcg/mL) infusion; Commonly   known as: Primacor; Replaced by: milrinone in 5 % dextrose 20 mg/100 mL   (200 mcg/mL) infusion   True Metrix Glucose Test Strip strip; Generic drug: blood sugar   diagnostic       Outpatient Follow-Up  Future Appointments   Date Time Provider Department Center   10/23/2023  3:30 PM Pedro Ross DO [id number]   11/3/2023  8:40 AM Kisha Reeves MD MPH [id number] [facility]       Beverly Rollins APRN-CNP

## 2023-10-21 NOTE — PROGRESS NOTES
Transitional Care Coordination Progress Note:  Patient discussed during interdisciplinary rounds.   Team members present: Deon Webster RN TCC, HVI, Nurse Manager  Plan per Medical/Surgical team: Milrinone gtt, Palliative consult, HF consult, PT/OT, pain control, goals of care discussion, not LVAD candidate  Payer: [employer] [facility]  Status: inpatient  Discharge disposition: Home w/ resumption of [facility] Infusion  Potential Barriers: None  ADOD: 10/21  Per team patient to be medically ready for discharge 10/21 and is to resume Milrinone Infusion with [facility]. Referral sent and communication with intake, pharmacy and RN for hookup conveyed patient ready for DC 10/21. Per Pharmacist, Milrinone to be delivered to bedside this evening around 8pm and RN to perform bedside hookup 10/21 around 1pm. Team and patient notified. Bedside nurse also made aware.   Deon Webster RN Transitional Care Coordinator [phone]

## 2023-10-21 NOTE — PROGRESS NOTES
HF PROGRESS NOTE    Stephan Gallo/[id number]    Admit Date: 10/10/2023  Hospital Length of Stay: 10   ICU Length of Stay: 8d 22h   Primary HF Cardiologist: Dr. Reeves    Subjective   No major overnight events. Has been stable on floor since transfer from HFICU. Noted to have some trigeminy on telemetry today. HF team following along on the floor for ongoing assistance with inotrope management/diuresis. Pt reports some abdominal bloating today but no significant change in swelling today. No other new complaints.     MEDICATIONS  Infusions:  milrinone, Last Rate: 0.375 mcg/kg/min (10/20/23 1906)      Scheduled:  docusate sodium, 100 mg, BID  DULoxetine, 30 mg, BID  empagliflozin, 25 mg, Daily  insulin lispro, 0-5 Units, TID with meals  polyethylene glycol, 17 g, Daily  QUEtiapine, 200 mg, Nightly  rivaroxaban, 20 mg, Daily with evening meal  rOPINIRole, 2 mg, Daily  spironolactone, 25 mg, Daily  torsemide, 100 mg, Daily      PRN:  acetaminophen, 975 mg, q6h PRN   Or  acetaminophen, 650 mg, q6h PRN   Or  acetaminophen, 650 mg, q6h PRN  ALPRAZolam, 2 mg, q8h PRN  dextrose 10 % in water (D10W), 0.3 g/kg/hr, Once PRN  dextrose, 25 g, q15 min PRN  diphenhydrAMINE, 25 mg, q6h PRN  glucagon, 1 mg, q15 min PRN  hydrOXYzine HCL, 25 mg, q6h PRN  ondansetron, 4 mg, q6h PRN  oxyCODONE, 5 mg, q4h PRN  oxygen, , Continuous PRN - O2/gases  traZODone, 150 mg, Nightly PRN      Invasive Hemodynamics:    Most Recent Range Past 24hrs   BP (Art) 117/84 No data recorded   MAP(Art) 96 mmHg No data recorded   RA/CVP 15 mmHg No data recorded   PA 63/28 No data recorded   PA(mean) 40 mmHg No data recorded   PCWP 22 mmHg No data recorded   CO 3.6 L/min No data recorded   CI 1.47 L/min/m2 No data recorded   Mixed Venous 73 % No data recorded   SVR  1155 (dyne*sec)/cm5 No data recorded    (dyne*sec)/cm5 No data recorded     PHYSICAL EXAM:   Visit Vitals  /77 (BP Location: Right arm, Patient Position: Lying)   Pulse 109  "  Temp 35.7 °C (96.2 °F) (Temporal)   Resp 20   Ht 1.727 m (5' 7.99\")   Wt 135 kg (297 lb 9.9 oz)   SpO2 98%   BMI 45.26 kg/m²   Smoking Status Every Day   BSA 2.54 m²     Wt Readings from Last 5 Encounters:   10/20/23 135 kg (297 lb 9.9 oz)   10/10/23 139 kg (307 lb)   12/08/22 133 kg (293 lb)   11/10/22 133 kg (292 lb 12.8 oz)   04/19/22 120 kg (264 lb 2 oz)     INTAKE/OUTPUT:  I/O last 3 completed shifts:  In: 1584.3 (11.7 mL/kg) [P.O.:1190; I.V.:394.3 (2.9 mL/kg)]  Out: 7700 (57 mL/kg) [Urine:7700 (1.6 mL/kg/hr)]  Weight: 135 kg      Physical Exam  Constitutional:       General: He is not in acute distress.     Appearance: He is obese. He is ill-appearing.   Neck:      Vascular: No hepatojugular reflux or JVD.   Cardiovascular:      Rate and Rhythm: Normal rate and regular rhythm.      Pulses: Normal pulses.      Heart sounds: S1 normal and S2 normal.   Pulmonary:      Effort: Pulmonary effort is normal.      Breath sounds: Normal breath sounds. No decreased breath sounds, wheezing, rhonchi or rales.   Abdominal:      General: Bowel sounds are normal. There is distension.      Palpations: Abdomen is soft. There is no hepatomegaly or splenomegaly.      Tenderness: There is no abdominal tenderness.   Genitourinary:     Comments: Voiding freely   Musculoskeletal:      Cervical back: Normal range of motion.      Right lower leg: No edema.      Left lower leg: No edema.   Neurological:      General: No focal deficit present.      Mental Status: He is alert and oriented to person, place, and time. Mental status is at baseline.       DATA:  CMP:  Recent Labs     10/20/23  0719 10/19/23  0436 10/18/23  0420 10/17/23  0426 10/16/23  0428 10/15/23  0347 10/14/23  1341 10/14/23  0248 10/13/23  0356 10/12/23  1118 10/12/23  0149 10/11/23  0629    132* 133* 134* 134* 136 134* 137 139 140 137 139   K 4.3 4.3 4.7 4.5 4.3 4.6 4.5 3.9 3.9 4.2 3.7 3.4*   CL 96* 97* 96* 95* 94* 98 94* 95* 99 100 99 100   CO2 27 27 27 31 29 " 28 29 28 30 29 29 28   ANIONGAP 17 12 15 13 15 15 16 18 14 15 13 14   BUN 25* 23 22 20 19 20 21 20 18 19 17 18   CREATININE 1.22 0.97 1.47* 1.15 1.04 1.13 1.22 1.46* 1.14 1.30 1.01 0.91   EGFR 80 >90 64 86 >90 87 80 64 87 74 >90 >90   MG 2.21 2.45* 2.38 2.32 2.19 2.46*  --  1.97 2.09  --  2.10 1.92     Recent Labs     10/20/23  0719 10/19/23  0436 10/18/23  0420 10/17/23  0426 10/16/23  0428 10/15/23  0347 10/14/23  1341 10/14/23  0248 10/11/23  0629 10/10/23  1910 09/24/23  1430 09/24/23  0448 09/23/23  1815 08/30/23  0337 08/29/23  1846 08/12/23  1602 07/31/23  1731 07/19/23  2308 07/17/23  1543 07/15/23  1814 11/05/21  0907 11/03/21  2241 10/14/21  0834 10/12/21  0018 10/10/21  0354 10/03/21  0625 10/02/21  1738 09/10/21  2045 09/09/21  1334 02/19/21  1725 02/18/21  2245   ALBUMIN 4.9 4.4 4.5 4.6 4.7 4.2 4.4 4.6   < > 4.6   < > 4.4 4.3   < > 4.5 4.8 4.7 4.6 4.4 4.4   < > 4.5   < > 4.4 4.3   < >  --    < > 4.7   < > 4.3   ALT  --   --   --   --   --   --   --   --   --  50  --  87* 86*  --  54* 86* 33 44* 50 55*   < > 19   < > 56* 20   < >  --    < > 25   < > 54*   AST  --   --   --   --   --   --   --   --   --  28  --  34 49*  --  29 42* 20 27 26 39   < > 18   < > 45* 19   < >  --    < > 25   < > 40*   BILITOT  --   --   --   --   --   --   --   --   --  0.5  --  0.3 0.4  --  0.6 0.7 0.5 0.4 0.5 0.4   < > 2.3*   < > 1.0 1.3*   < >  --    < > 0.6   < > 1.0   LIPASE  --   --   --   --   --   --   --   --   --   --   --   --   --   --   --   --  43  --   --  42  --  23  --  54 23  --  105*  --  90*  --  40    < > = values in this interval not displayed.     CBC:  Recent Labs     10/20/23  0719 10/19/23  0436 10/18/23  0420 10/17/23  0426 10/16/23  0428 10/15/23  0347 10/14/23  0248 10/13/23  0356   WBC 5.9 6.0 6.4 6.1 5.9 5.1 8.5 6.6   HGB 13.7 13.1* 13.0* 12.9* 12.8* 12.0* 13.4* 12.9*   HCT 45.0 40.1* 41.9 39.8* 41.0 37.3* 41.4 41.1    260 314 271 275 242 316 304   MCV 83 77* 80 77* 81 78* 78* 82     COAG:    Recent Labs     10/12/23  1732 10/10/23  1910 09/24/23  0448 09/23/23  1815 08/29/23  1846 07/17/23  1543 07/12/23  1701 01/01/23  0613   INR 1.0 1.1 1.0 1.0 1.1 1.1 1.2* 1.1     ABO:   Recent Labs     10/12/23  1732   ABO A     HEME/ENDO:  Recent Labs     10/10/23  1910 08/29/23  1846 06/13/23  0537   FERRITIN 189 239  --    IRONSAT 23* 22*  --    TSH 2.46 2.05  --    HGBA1C  --  6.2* 8.5*      CARDIAC:   Recent Labs     10/13/23  1259 10/13/23  0356 10/12/23  1732 10/10/23  1910 09/25/23  1054 09/23/23  2056 09/23/23  1902 09/23/23  1815 09/05/23  1557 08/30/23  0339 08/29/23  1846 07/17/23  1640 07/17/23  1543 07/15/23  1920 07/15/23  1814 07/12/23  1739 07/12/23  1701 07/07/23  1546 08/16/21  1756 08/05/21  0947 08/03/21  1336 06/27/21  0328 06/19/21  0657 06/03/21  1243 05/26/21  1440 03/08/21  1001 03/04/21  1835   LDH  --   --  153  --   --   --   --   --   --   --   --   --   --   --   --   --   --   --   --  168  --  150  --  331*  --  116 157   TROPHS 18 25  --  30  --  47 40 38 10 14 CANCELED   < > 8   < > 11   < >  --  17   < >  --   --   --   --   --   --   --   --    BNP  --   --   --  176* CANCELED  --   --  119*  --   --  204*  --  405*  --  400*  --  161* 768*   < >  --    < >  --    < >  --    < >  --   --     < > = values in this interval not displayed.     Recent Labs     10/14/23  1348 10/14/23  1112 10/14/23  0551 10/13/23  2334 10/13/23  1809 10/11/23  1830 10/11/23  1519 09/25/23  1836 09/05/23  0534 09/04/23  2225 09/03/23  1058 09/03/23  0501   LACMX 2.0  --   --   --   --   --  1.2 0.6  --  1.1  --  0.8   SO2MV 49 65 73 59 57   < > 46 63   < > 69   < > 72   O2CMX 48.4 63.8 71.2 57.1 55.6   < > 45.5 61.9   < > 67.5   < > 70.8    < > = values in this interval not displayed.     Chest X-ray ( 10/12)  1. Trace bibasilar pleural effusions with atelectasis.  2. Mild perihilar congestion/edema.  3. Medical devices as described above.  CT of Chest (10/12)     1. Mild enlargement of the left  ventricle.  2. The sternum lies within 5 mm of the right ventricle.  3. Trace bilateral pleural effusions with atelectasis.  4. Hepatic steatosis.    Vascular US Ankle Brachial Index without exercise (10/12)  Bilateral Lower PVR: No evidence of arterial occlusive disease bilaterally in the lower extremities at rest.  Right Lower PVR: Biphasic flow is noted in the right common femoral artery, right posterior tibial artery and right dorsalis pedis artery.  Left Lower PVR: Biphasic flow is noted in the left common femoral artery, left posterior tibial artery and left dorsalis pedis artery.     Prior to Admission Meds:  Medications Prior to Admission   Medication Sig Dispense Refill Last Dose    acetaminophen (Tylenol) 325 mg tablet Take 2 tablets (650 mg) by mouth every 4 hours if needed for mild pain (1 - 3).   Unknown    ALPRAZolam (Xanax) 2 mg tablet Take 1 tablet (2 mg) by mouth 3 times a day.   10/10/2023    blood sugar diagnostic strip USE TO TEST BEFORE MEALS AND AT BEDTIME (Patient not taking: Reported on 10/11/2023) 100 each 1 Unknown    cholecalciferol (Vitamin D-3) 25 MCG (1000 UT) capsule Take 1 capsule (25 mcg) by mouth once daily in the morning.   10/10/2023    cyanocobalamin (Vitamin B-12) 1,000 mcg tablet Take 100 mcg by mouth once daily.   10/10/2023    diphenhydrAMINE (Benadryl Allergy) 25 mg tablet Take 1 tablet (25 mg) by mouth as needed at bedtime for allergies.   Unknown    DULoxetine (Cymbalta) 30 mg DR capsule Take 1 capsule (30 mg) by mouth 2 times a day.   10/10/2023    empagliflozin (Jardiance) 10 mg Take 1 tablet (10 mg) by mouth once daily.   10/10/2023    ezetimibe (Zetia) 10 mg tablet Take 1 tablet (10 mg) by mouth once daily.   Unknown    gabapentin (Neurontin) 100 mg capsule TAKE 2 CAPSULES BY MOUTH THREE TIMES A DAY. 180 capsule 3 10/10/2023    hydrOXYzine HCL (Atarax) 50 mg tablet Take 2 tablets (100 mg) by mouth once daily at bedtime.   10/9/2023 at evening    insulin lispro  (HumaLOG) 100 unit/mL injection INJECT THREE TIMES A DAY VIA ATTACHED SLIDING SCALE (Patient not taking: Reported on 10/11/2023) 15 mL 1 Not Taking    magnesium oxide (Mag-Ox) 400 mg (241.3 mg magnesium) tablet Take 1 tablet (400 mg) by mouth once daily.   10/10/2023    milrinone in 5 % dextrose (Primacor) 40 mg/200 mL (200 mcg/mL) infusion 0.25 mcg/kg/min intravenous continuously   10/10/2023    polyethylene glycol (Glycolax, Miralax) 17 gram/dose powder Take 17 g by mouth once daily.   10/10/2023    QUEtiapine (SEROquel) 200 mg tablet Take 1 tablet (200 mg) by mouth once daily at bedtime.   10/9/2023 at evening    rivaroxaban (Xarelto) 20 mg tablet 1 tab(s) orally  - Daily 1800   10/10/2023    rOPINIRole (Requip) 2 mg tablet Take 1 tablet (2 mg) by mouth once daily at bedtime.   10/9/2023 at evening    sennosides-docusate sodium (Keyonna-Colace) 8.6-50 mg tablet Take 2 tablets by mouth 2 times a day.   10/10/2023    spironolactone (Aldactone) 25 mg tablet Take 1 tablet (25 mg) by mouth once daily.   10/10/2023    torsemide (Demadex) 100 mg tablet Take 1 tablet (100 mg) by mouth once daily.   10/10/2023    traZODone (Desyrel) 150 mg tablet Take 1 tablet (150 mg) by mouth once daily at bedtime.   10/9/2023 at evening       ASSESSMENT AND PLAN:    Stephan Gallo is a 32 y/o M with PMHx significant for stage D, NYHA class IV HFrEF (10%) with RV dysfunction s/p ICD (6/2020) now with subcutaneous ICD 2/2 infection, nonischemic cardiomyopathy (2018 2/2 hydrocarbon intoxication), moderate to severe TR, COPD, DAVONTE (not on CPAP), DM2, Anxiety, Depression, PTSD, ADHD, nicotine abuse (current smoker), ETOH abuse, MSSA, GERD, LV thrombus, and PE (on xarelto) who presented to his outpatient clinic visit with Dr. Reeves where he admitted to dyspnea on exertion and significant chest pressure. He was admitted to HFICU 10/10 for further management of Acute Decompensated Heart Failure and potential LVAD therapy work-up. [place]  catheter inserted and with numbers consistent with low output state on his home milrinone dose of 0.25. Milrinone increased to 0.375 mcg/kg/min and advanced therapies email sent 10/12. Milrinone decreased to 0.25 on 10/14 d/t hypotension, but was increased back to 0.375 10/15. Patient was presented to advanced therapy selection committee 10/17 and formally declined for LVAD due to psychosocial concerns including uncontrolled pain, h/o substance use, SI/HI with many hospitalizations. He will be offered to seek second opinion at [facility]. Otherwise plan to optimize medically and discharge home with inotropes and palliative care.    Cardiovascular:  #NYHA Class IV, Stage D systolic heart failure NICM/HFrEF (15-20%)  #S/p ICD (2020) c/b infection/ endocarditis replaced with subcutaneous ICD (2022)  - Echo (8/30/23): LVEF 15-20%, LV severely dilated. Global hypokinesis of the LV. Moderately reduced RV function. LA mild-moderately dilated. RA is moderately to severely dilated. Moderately elevated PAP.  Closing SGC # (10/14): BP 94/53 (67) CVP 21, PAP 69/42 (53), CO/CI 5.08/2.07, , SvO2 65% on milrinone 0.375, levophed 0.7, empagliflozin 10 mg and spironolactone 25 mg   - C/w milrinone 0.375 mcg/kg/min. Monitor telemetry for further ectopy.  - Restart hydralazine 10mg today. BP has been stable since   -C/w empagliflozin 10 mg daily   -C/w home spironolactone 25 mg daily   -Holding home metoprolol succinate 25 mg daily  - Daily torsemide 100mg working well for UOP. If he accumulates fluid, can consider changing to bumex.  -Daily standing weights, regular diet, 2L fluid restriction, strict I&Os    #Advanced Therapies Evaluation  -Completed workup, presented at selection committee today 10/17. Formally declined for LVAD. Offered referral to second opinion at [facility].   -Palliative care following, appreciate assistance     # Chronic Chest Pain with multiple ED visits (30-40 visits)  # Pain management consulted and following   -  EKG (10/10/23) sinus tachycardia   -Troponin (10/10) 30  - State EKG and troponin lab with negative results (10/13)    #Nonobstructive CAD   [facility] (2020) at [facility] negative     #HLD  Lipid panel (8/29/23): cholesterol 238, HDL 32, , VLDL 75,   -C/w home ezetimibe 10 mg daily   -C/w home atorvastatin 40 mg at night      #No history of Arrhythmias  -Initially, ICD placed in 2020. Removed due to infection.  -Subcutaneous ICD in place for primary prevention         Patient seen and assessed with Dr. Cordero.  _________________________________________________  Hyacinth Riley DO

## 2023-10-22 NOTE — PROGRESS NOTES
"HF PROGRESS NOTE    Stephan Gallo/[id number]    Admit Date: 10/10/2023  Hospital Length of Stay: 11   ICU Length of Stay: 8d 22h   Primary HF Cardiologist: Dr. Reeves    Subjective   No major overnight events. Primary team planning discharge home today. Tolerating oral medications well. No new symptoms/complaints today.   MEDICATIONS   Infusions:  milrinone, Last Rate: 0.375 mcg/kg/min (10/20/23 1906)     Scheduled:  docusate sodium, 100 mg, BID  DULoxetine, 30 mg, BID  empagliflozin, 25 mg, Daily  insulin lispro, 0-5 Units, TID with meals  polyethylene glycol, 17 g, Daily  QUEtiapine, 200 mg, Nightly  rivaroxaban, 20 mg, Daily with evening meal  rOPINIRole, 2 mg, Daily  spironolactone, 25 mg, Daily  torsemide, 100 mg, Daily       PRN:  acetaminophen, 975 mg, q6h PRN   Or  acetaminophen, 650 mg, q6h PRN   Or  acetaminophen, 650 mg, q6h PRN  ALPRAZolam, 2 mg, q8h PRN  dextrose 10 % in water (D10W), 0.3 g/kg/hr, Once PRN  dextrose, 25 g, q15 min PRN  diphenhydrAMINE, 25 mg, q6h PRN  glucagon, 1 mg, q15 min PRN  hydrOXYzine HCL, 25 mg, q6h PRN  ondansetron, 4 mg, q6h PRN  oxyCODONE, 5 mg, q4h PRN  oxygen, , Continuous PRN - O2/gases  traZODone, 150 mg, Nightly PRN    Invasive Hemodynamics:    Most Recent Range Past 24hrs   BP (Art) 117/84 No data recorded   MAP(Art) 96 mmHg No data recorded   RA/CVP 15 mmHg No data recorded   PA 63/28 No data recorded   PA(mean) 40 mmHg No data recorded   PCWP 22 mmHg No data recorded   CO 3.6 L/min No data recorded   CI 1.47 L/min/m2 No data recorded   Mixed Venous 73 % No data recorded   SVR  1155 (dyne*sec)/cm5 No data recorded    (dyne*sec)/cm5 No data recorded     PHYSICAL EXAM:   Visit Vitals  /75 (BP Location: Right arm, Patient Position: Lying)   Pulse 64   Temp 36.1 °C (97 °F) (Temporal)   Resp 18   Ht 1.727 m (5' 7.99\")   Wt 134 kg (296 lb 4.8 oz)   SpO2 94%   BMI 45.06 kg/m²   Smoking Status Every Day   BSA 2.54 m²     Wt Readings from Last 5 " Encounters:   10/21/23 134 kg (296 lb 4.8 oz)   10/10/23 139 kg (307 lb)   12/08/22 133 kg (293 lb)   11/10/22 133 kg (292 lb 12.8 oz)   04/19/22 120 kg (264 lb 2 oz)     INTAKE/OUTPUT:  I/O last 3 completed shifts:  In: 1546.5 (11.5 mL/kg) [P.O.:1080; I.V.:466.5 (3.5 mL/kg)]  Out: 2850 (21.2 mL/kg) [Urine:2850 (0.6 mL/kg/hr)]  Weight: 134.4 kg      Physical Exam  Constitutional:       General: He is not in acute distress.     Appearance: He is obese. He is ill-appearing.   Neck:      Vascular: No hepatojugular reflux or JVD.   Cardiovascular:      Rate and Rhythm: Normal rate and regular rhythm.      Pulses: Normal pulses.      Heart sounds: S1 normal and S2 normal.   Pulmonary:      Effort: Pulmonary effort is normal.      Breath sounds: Normal breath sounds. No decreased breath sounds, wheezing, rhonchi or rales.   Abdominal:      General: Bowel sounds are normal. There is distension.      Palpations: Abdomen is soft. There is no hepatomegaly or splenomegaly.      Tenderness: There is no abdominal tenderness.   Genitourinary:     Comments: Voiding freely   Musculoskeletal:      Cervical back: Normal range of motion.      Right lower leg: No edema.      Left lower leg: No edema.   Neurological:      General: No focal deficit present.      Mental Status: He is alert and oriented to person, place, and time. Mental status is at baseline.       DATA:  CMP:  Recent Labs     10/21/23  0606 10/20/23  0719 10/19/23  0436 10/18/23  0420 10/17/23  0426 10/16/23  0428 10/15/23  0347 10/14/23  1341 10/14/23  0248 10/13/23  0356 10/12/23  1118 10/12/23  0149    136 132* 133* 134* 134* 136 134* 137 139   < > 137   K 3.7 4.3 4.3 4.7 4.5 4.3 4.6 4.5 3.9 3.9   < > 3.7   CL 95* 96* 97* 96* 95* 94* 98 94* 95* 99   < > 99   CO2 28 27 27 27 31 29 28 29 28 30   < > 29   ANIONGAP 17 17 12 15 13 15 15 16 18 14   < > 13   BUN 28* 25* 23 22 20 19 20 21 20 18   < > 17   CREATININE 1.03 1.22 0.97 1.47* 1.15 1.04 1.13 1.22 1.46* 1.14    < > 1.01   EGFR >90 80 >90 64 86 >90 87 80 64 87   < > >90   MG 2.35 2.21 2.45* 2.38 2.32 2.19 2.46*  --  1.97 2.09  --  2.10    < > = values in this interval not displayed.     Recent Labs     10/21/23  0606 10/20/23  0719 10/19/23  0436 10/18/23  0420 10/17/23  0426 10/16/23  0428 10/15/23  0347 10/14/23  1341 10/11/23  0629 10/10/23  1910 09/24/23  1430 09/24/23  0448 09/23/23  1815 08/30/23  0337 08/29/23  1846 08/12/23  1602 07/31/23  1731 07/19/23  2308 07/17/23  1543 07/15/23  1814 11/05/21  0907 11/03/21  2241 10/14/21  0834 10/12/21  0018 10/10/21  0354 10/03/21  0625 10/02/21  1738 09/10/21  2045 09/09/21  1334 02/19/21  1725 02/18/21  2245   ALBUMIN 4.8 4.9 4.4 4.5 4.6 4.7 4.2 4.4   < > 4.6   < > 4.4 4.3   < > 4.5 4.8 4.7 4.6 4.4 4.4   < > 4.5   < > 4.4 4.3   < >  --    < > 4.7   < > 4.3   ALT  --   --   --   --   --   --   --   --   --  50  --  87* 86*  --  54* 86* 33 44* 50 55*   < > 19   < > 56* 20   < >  --    < > 25   < > 54*   AST  --   --   --   --   --   --   --   --   --  28  --  34 49*  --  29 42* 20 27 26 39   < > 18   < > 45* 19   < >  --    < > 25   < > 40*   BILITOT  --   --   --   --   --   --   --   --   --  0.5  --  0.3 0.4  --  0.6 0.7 0.5 0.4 0.5 0.4   < > 2.3*   < > 1.0 1.3*   < >  --    < > 0.6   < > 1.0   LIPASE  --   --   --   --   --   --   --   --   --   --   --   --   --   --   --   --  43  --   --  42  --  23  --  54 23  --  105*  --  90*  --  40    < > = values in this interval not displayed.     CBC:  Recent Labs     10/21/23  0606 10/20/23  0719 10/19/23  0436 10/18/23  0420 10/17/23  0426 10/16/23  0428 10/15/23  0347 10/14/23  0248   WBC 5.4 5.9 6.0 6.4 6.1 5.9 5.1 8.5   HGB 13.5 13.7 13.1* 13.0* 12.9* 12.8* 12.0* 13.4*   HCT 43.4 45.0 40.1* 41.9 39.8* 41.0 37.3* 41.4    283 260 314 271 275 242 316   MCV 80 83 77* 80 77* 81 78* 78*     COAG:   Recent Labs     10/12/23  1732 10/10/23  1910 09/24/23  0448 09/23/23  1815 08/29/23  1846 07/17/23  1543 07/12/23  1701  01/01/23  0613   INR 1.0 1.1 1.0 1.0 1.1 1.1 1.2* 1.1     ABO:   Recent Labs     10/12/23  1732   ABO A     HEME/ENDO:  Recent Labs     10/10/23  1910 08/29/23  1846 06/13/23  0537   FERRITIN 189 239  --    IRONSAT 23* 22*  --    TSH 2.46 2.05  --    HGBA1C  --  6.2* 8.5*      CARDIAC:   Recent Labs     10/13/23  1259 10/13/23  0356 10/12/23  1732 10/10/23  1910 09/25/23  1054 09/23/23  2056 09/23/23  1902 09/23/23  1815 09/05/23  1557 08/30/23  0339 08/29/23  1846 07/17/23  1640 07/17/23  1543 07/15/23  1920 07/15/23  1814 07/12/23  1739 07/12/23  1701 07/07/23  1546 08/16/21  1756 08/05/21  0947 08/03/21  1336 06/27/21  0328 06/19/21  0657 06/03/21  1243 05/26/21  1440 03/08/21  1001 03/04/21  1835   LDH  --   --  153  --   --   --   --   --   --   --   --   --   --   --   --   --   --   --   --  168  --  150  --  331*  --  116 157   TROPHS 18 25  --  30  --  47 40 38 10 14 CANCELED   < > 8   < > 11   < >  --  17   < >  --   --   --   --   --   --   --   --    BNP  --   --   --  176* CANCELED  --   --  119*  --   --  204*  --  405*  --  400*  --  161* 768*   < >  --    < >  --    < >  --    < >  --   --     < > = values in this interval not displayed.     Recent Labs     10/14/23  1348 10/14/23  1112 10/14/23  0551 10/13/23  2334 10/13/23  1809 10/11/23  1830 10/11/23  1519 09/25/23  1836 09/05/23  0534 09/04/23  2225 09/03/23  1058 09/03/23  0501   LACMX 2.0  --   --   --   --   --  1.2 0.6  --  1.1  --  0.8   SO2MV 49 65 73 59 57   < > 46 63   < > 69   < > 72   O2CMX 48.4 63.8 71.2 57.1 55.6   < > 45.5 61.9   < > 67.5   < > 70.8    < > = values in this interval not displayed.     Chest X-ray ( 10/12)  1. Trace bibasilar pleural effusions with atelectasis.  2. Mild perihilar congestion/edema.  3. Medical devices as described above.  CT of Chest (10/12)     1. Mild enlargement of the left ventricle.  2. The sternum lies within 5 mm of the right ventricle.  3. Trace bilateral pleural effusions with atelectasis.  4.  Hepatic steatosis.    Vascular US Ankle Brachial Index without exercise (10/12)  Bilateral Lower PVR: No evidence of arterial occlusive disease bilaterally in the lower extremities at rest.  Right Lower PVR: Biphasic flow is noted in the right common femoral artery, right posterior tibial artery and right dorsalis pedis artery.  Left Lower PVR: Biphasic flow is noted in the left common femoral artery, left posterior tibial artery and left dorsalis pedis artery.     Prior to Admission Meds:  No medications prior to admission.       ASSESSMENT AND PLAN:    Stephan Gallo is a 34 y/o M with PMHx significant for stage D, NYHA class IV HFrEF (10%) with RV dysfunction s/p ICD (6/2020) now with subcutaneous ICD 2/2 infection, nonischemic cardiomyopathy (2018 2/2 hydrocarbon intoxication), moderate to severe TR, COPD, DAVONTE (not on CPAP), DM2, Anxiety, Depression, PTSD, ADHD, nicotine abuse (current smoker), ETOH abuse, MSSA, GERD, LV thrombus, and PE (on xarelto) who presented to his outpatient clinic visit with Dr. Reeves where he admitted to dyspnea on exertion and significant chest pressure. He was admitted to HFICU 10/10 for further management of Acute Decompensated Heart Failure and potential LVAD therapy work-up. [place]- [place] catheter inserted and with numbers consistent with low output state on his home milrinone dose of 0.25. Milrinone increased to 0.375 mcg/kg/min and advanced therapies email sent 10/12. Milrinone decreased to 0.25 on 10/14 d/t hypotension, but was increased back to 0.375 10/15. Patient was presented to advanced therapy selection committee 10/17 and formally declined for LVAD due to psychosocial concerns including uncontrolled pain, h/o substance use, SI/HI with many hospitalizations. He will be offered to seek second opinion at [facility]. Otherwise plan to optimize medically and discharge home with inotropes and palliative care.    Cardiovascular:  #NYHA Class IV, Stage D systolic heart failure  NICM/HFrEF (15-20%)  #S/p ICD (2020) c/b infection/ endocarditis replaced with subcutaneous ICD (2022)  - Echo (8/30/23): LVEF 15-20%, LV severely dilated. Global hypokinesis of the LV. Moderately reduced RV function. LA mild-moderately dilated. RA is moderately to severely dilated. Moderately elevated PAP.  Closing SGC # (10/14): BP 94/53 (67) CVP 21, PAP 69/42 (53), CO/CI 5.08/2.07, , SvO2 65% on milrinone 0.375, levophed 0.7, empagliflozin 10 mg and spironolactone 25 mg   - C/w milrinone 0.375 mcg/kg/min.  - Recommended restarting hydralazine yesterday. Pt not discharged home on it; BP remained stable since yesterday.  -C/w empagliflozin 10 mg daily   -C/w home spironolactone 25 mg daily   -Holding home metoprolol succinate 25 mg daily  - Daily torsemide 100mg working well for UOP. If he accumulates fluid, can consider changing to bumex.  -Daily standing weights, regular diet, 2L fluid restriction, strict I&Os    #Advanced Therapies Evaluation  -Completed workup, presented at selection committee today 10/17. Formally declined for LVAD. Offered referral to second opinion at [facility].   -Palliative care following, appreciate assistance     # Chronic Chest Pain with multiple ED visits (30-40 visits)  # Pain management consulted and following   - EKG (10/10/23) sinus tachycardia   -Troponin (10/10) 30  - State EKG and troponin lab with negative results (10/13)    #Nonobstructive CAD   C (2020) at [facility] negative     #HLD  Lipid panel (8/29/23): cholesterol 238, HDL 32, , VLDL 75,   -C/w home ezetimibe 10 mg daily   -C/w home atorvastatin 40 mg at night      #No history of Arrhythmias  -Initially, ICD placed in 2020. Removed due to infection.  -Subcutaneous ICD in place for primary prevention         Patient seen and assessed with Dr. Fontanez  _________________________________________________  Hyacinth Riley DO

## 2023-10-23 NOTE — PROGRESS NOTES
Subjective   Patient ID: Stephan Gallo is a 33 y.o. male who presents for New Patient Visit.    HPI     Liban is complex patient with HFrEF, substance abuse in remission, COPD, GERD, suicidal ideation, DM, chronic chest pain and dyspnea presenting today for chronic pain eval.      Patient has chronic chest pain that radiates to his neck and radiates to right chest. Hurts with palpation and movement, pain avg 7/10, worst 10/10. Oxycodone is helpful. Medication alos helps for dyspnea. Has been following with palliative care while in patient. Also has lower back pain from horse riding issue.     Follows with Dr. Wylie for drpresison and anxiety. Takes duloxetine and alprazolam which have been helpful    Used to smoke marijuana and drank EtOH--clean 12/6/2020    Smokes 2-3 cigs/day     Review of Systems   All other systems reviewed and are negative.      Objective   Wt 137 kg (303 lb)   BMI 46.08 kg/m²     Physical Exam  Constitutional:       Appearance: Normal appearance.   HENT:      Head: Normocephalic and atraumatic.   Cardiovascular:      Rate and Rhythm: Normal rate and regular rhythm.      Heart sounds: Normal heart sounds. No murmur heard.     No gallop.   Pulmonary:      Effort: Respiratory distress present.      Breath sounds: Normal breath sounds. No wheezing or rales.   Musculoskeletal:      Comments: +pain with palpation to upper chest    Skin:     General: Skin is warm and dry.      Findings: No rash.      Comments: Agrawal in place --milnirone pump in place    Neurological:      Mental Status: He is alert and oriented to person, place, and time. Mental status is at baseline.   Psychiatric:         Mood and Affect: Mood normal.         Behavior: Behavior normal.         Assessment/Plan     #chronic chest pain and dyspnea  -Pain and dyspnea not well controlled. Patient has been on opiates for some time and per palliative care RN, likely becoming tolerate to opiates  -Increase oxycodone to 10mg  q4h prn x 30 days  -UDS and CSA today   -I have personally reviewed the OARRS report for . This report is scanned into the electronic medical record. I have considered the risks of abuse, dependence, addiction and diversion.      RTC 4 weeks

## 2023-10-24 NOTE — PROGRESS NOTES
Stephan Brandonncjenniffer Gallo is receiving continuous milrinone for CHF     Followed by Edgar Truong       RX contacted pt, left VM stating that pharmacy would be delivering next shipment of tx today and would be sending supplies/flushes to match. Callback number left in case pt had questions/concerns.      Dispensing 10/24 with supplies to match:  4x milrinone 48-hr bags    DOS 10/27-11/3  H/U 10/27, 10/29, 10/31, 11/2      Follow up 11/1 check weight, send straight

## 2023-10-30 ENCOUNTER — DOCUMENTATION (OUTPATIENT)
Dept: CARE COORDINATION | Facility: CLINIC | Age: 33
End: 2023-10-30

## 2023-10-30 NOTE — PROGRESS NOTES
Referral received for care management services. Will outreach to patient to assess needs and provide support.  Enrolled patient in [email or web address] chatbot for additional support and education through transition period.  Will continue to monitor through transition period.

## 2023-10-31 ENCOUNTER — HOME INFUSION (OUTPATIENT)
Dept: INFUSION THERAPY | Age: 33
End: 2023-10-31

## 2023-10-31 NOTE — PROGRESS NOTES
[facility] contacted patient mother to check on progress with milrinone infusion. Was notified that patient  10/27/23. Understands Pharmacy will contact her to obtain CADD pumps. No further pharmacy needs.    Patient rep to arrange for return of CADD pumps as soon as possible.    Discharge from Pharmacy Service

## 2023-11-03 ENCOUNTER — APPOINTMENT (OUTPATIENT)
Dept: CARDIOLOGY | Facility: CLINIC | Age: 33
End: 2023-11-03
Payer: COMMERCIAL

## 2023-11-20 ENCOUNTER — APPOINTMENT (OUTPATIENT)
Dept: PRIMARY CARE | Facility: CLINIC | Age: 33
End: 2023-11-20
Payer: COMMERCIAL